# Patient Record
Sex: FEMALE | Race: WHITE | URBAN - METROPOLITAN AREA
[De-identification: names, ages, dates, MRNs, and addresses within clinical notes are randomized per-mention and may not be internally consistent; named-entity substitution may affect disease eponyms.]

---

## 2017-01-09 ENCOUNTER — OFFICE VISIT (OUTPATIENT)
Dept: DERMATOLOGY | Facility: CLINIC | Age: 67
End: 2017-01-09

## 2017-01-09 VITALS — DIASTOLIC BLOOD PRESSURE: 90 MMHG | HEART RATE: 73 BPM | SYSTOLIC BLOOD PRESSURE: 139 MMHG

## 2017-01-09 DIAGNOSIS — L30.9 CHRONIC DERMATITIS: ICD-10-CM

## 2017-01-09 DIAGNOSIS — L30.9 CHRONIC DERMATITIS OF HANDS: ICD-10-CM

## 2017-01-09 DIAGNOSIS — L23.9 ALLERGIC CONTACT DERMATITIS, UNSPECIFIED TRIGGER: ICD-10-CM

## 2017-01-09 DIAGNOSIS — L23.5 ALLERGIC DERMATITIS DUE TO OTHER CHEMICAL PRODUCT: Primary | ICD-10-CM

## 2017-01-09 RX ORDER — MINOCYCLINE HYDROCHLORIDE 100 MG/1
100 TABLET ORAL 2 TIMES DAILY
Qty: 60 TABLET | Refills: 2 | Status: ON HOLD | OUTPATIENT
Start: 2017-01-09 | End: 2017-07-03

## 2017-01-09 RX ORDER — TIZANIDINE 2 MG/1
2 TABLET ORAL
Status: ON HOLD | COMMUNITY
Start: 2016-11-15 | End: 2017-07-03

## 2017-01-09 RX ORDER — CLOBETASOL PROPIONATE 0.5 MG/G
OINTMENT TOPICAL
Qty: 60 G | Refills: 11 | Status: SHIPPED | OUTPATIENT
Start: 2017-01-09

## 2017-01-09 RX ORDER — PREDNISONE 10 MG/1
TABLET ORAL
Qty: 49 TABLET | Refills: 2 | Status: SHIPPED | OUTPATIENT
Start: 2017-01-09 | End: 2017-04-17

## 2017-01-09 ASSESSMENT — PAIN SCALES - GENERAL: PAINLEVEL: NO PAIN (0)

## 2017-01-09 NOTE — NURSING NOTE
"Dermatology Rooming Note    Kasey Ibrahim's goals for this visit include:   Chief Complaint   Patient presents with     Derm Problem     Kasey is here today for a 8 week dermatitis follow up. Kasey notes\" The dermatitis is still there. The cracks are still there but everything else it better\"     Charley Marquez MA    "

## 2017-01-09 NOTE — Clinical Note
"1/9/2017       RE: Kasey Ibrahim  [address] [facility] [address]     Dear Colleague,    Thank you for referring your patient, Kasey Ibrahim, to the [facility] DERMATOLOGY at [facility]. Please see a copy of my visit note below.    [facility] Dermatology Note      Dermatology Problem List:  1. Suspected ACD hands and feet:  BX 4/2014 showed spongiotic dermatitis with eos on hands:  DDx ACD vs atopic dermatitis vs numular dermatitis   -Past tx:  nb uvb, MMF, acitretin, methotrexate (all prior to presenting at the )  -Previous patch testing:  positives to gold and bacitracin with possible reactions to 2-bromo-2-nitropropane and mercapto mix (per notes from past dermatologist, full details unavailable).   -Currently on minocycline (started 8/2016), prednisone, clobetasol 0.05% ointment  -Cannot use dapsone due to sulfa allergy, cannot due [employer] due to work schedule    Encounter Date: Jan 9, 2017    CC:   Chief Complaint   Patient presents with     Derm Problem     Kasey is here today for a 8 week dermatitis follow up. Kasey notes\" The dermatitis is still there. The cracks are still there but everything else it better\"         History of Present Illness:  Ms. Kasey Ibrahim is a 66 year old female who presents for follow-up of allergic contact dermatitis.  Last seen 10/31/2016.  At that time, plan was to switch to dapsone, but patient has sulfa allergy.  She has since been maintained on prednisone 10mg daily and minocycline 100mg BID.  Since last visit, her hands and feet are about the same.  She notes that she continues to develop painful fissures on the palms, finger tips and heels. Her palms are very itchy.  She notes that she had patch testing done at [facility] in the past.  She does not know her allergens.  She uses \"dermatologist\" approved products.  She does not remember getting a packet of information on products she can use.  Today, she " brings with her a lotion that she takes with her to work that has a formaldehyde releaser in it, which is one of her allergens.  She does not know how to read product labels to avoid her allergens.  She uses Dove soap in the shower.  She uses Free and Clear shampoo.   In addition to GoldBond lotion she has with her, she uses other lotions at home but is unsure of the names.      No other skin concerns today.    Past Medical History:   There is no problem list on file for this patient.    Past Medical History   Diagnosis Date     Depression      Hand dermatitis      Past Surgical History   Procedure Laterality Date     Foot surgery       Social History:  The patient works as a  at [employer]. The patient denies use of tanning beds.    Family History:  There is no family history of asthma, eczema or allergies.    Medications:  Current Outpatient Prescriptions   Medication Sig Dispense Refill     tiZANidine (ZANAFLEX) 2 MG tablet Take 2 mg by mouth       predniSONE (DELTASONE) 10 MG tablet Take 1 tablet (10 mg) by mouth daily 30 tablet 2     minocycline (DYNACIN) 100 MG tablet Take 1 tablet (100 mg) by mouth 2 times daily 120 tablet 1     clobetasol (TEMOVATE) 0.05 % ointment Use Monday through Friday twice daily. Can decrease to once daily once hands improved and then just as needed once resolved for mild flares. 60 g 0     FLUOXETINE HCL PO        BuPROPion HCl (WELLBUTRIN PO)        mupirocin (BACTROBAN) 2 % ointment Apply topically 3 times daily          Allergies   Allergen Reactions     Penicillins Rash     Sulfa Drugs Rash     Review of Systems:  -Constitutional:  Feeling well, in usual state of health.  -Skin:  As per HPI, no additional concerns.    Physical exam:  Vitals: /90 mmHg  Pulse 73  GEN: This is a well developed, well-nourished female in no acute distress, in a pleasant mood.     SKIN: Focused examination of the face, hands and feet was performed.  -Hyperkeratotic skin colored papules at  the thenar eminence bilaterally.  -Deep fissuring at several fingertips, worst on the right first digit.  -Deep fissuring at the bilateral heels.     -No other lesions of concern on areas examined.      Impression/Plan:  1. Suspected allergic contact dermatitis (ACD) of the hands and feet:  Reviewed pathogenesis of allergic contact dermatitis in detail today.  We suspect that patient is still coming into contact with allergens, as displayed by hand lotion containing a formaldehyde releaser.  Today, we will request records from [facility] on previous patch testing to see if reactions were felt to be relevant.  We will attempt to obtain patient's camp numbers so she does not have to read all labels.  We did give the patient a list of formaldehyde releasers to look for in products.  Patient is willing to bring in products with her to next appointment so labels can be checked.  In the meantime, instructed patient to use only Vanicream moisturizer on her hands.  We discussed that Dove soap is likely ok, but would recommend use of Vanicream bar soap to be safe in the meantime.  We will continue prednisone (will do 20mg daily x7 days then 10mg daily until next follow up), clobetasol 0.05% ointment twice daily after soaking, and minocycline 100mg BID until next follow up.  If records from patch testing and review of products does not offer new insight, would consider trial of cyclosporine as it does not appear this was tried from review of past records.      Follow-up in 6 weeks, earlier for new or changing lesions.     Dr. Zuniga staffed the patient.    Staff Involved:  Resident(Elaina Osborne)/Staff(as above)    .I was present for key portions of the history and exam.  See resident note for pertinent history, exam, and treatment plan.  Randee Zuniga MD

## 2017-01-09 NOTE — PROGRESS NOTES
"[facility] Dermatology Note      Dermatology Problem List:  1. Suspected ACD hands and feet:  BX 4/2014 showed spongiotic dermatitis with eos on hands:  DDx ACD vs atopic dermatitis vs numular dermatitis   -Past tx:  nb uvb, MMF, acitretin, methotrexate (all prior to presenting at the )  -Previous patch testing:  positives to gold and bacitracin with possible reactions to 2-bromo-2-nitropropane and mercapto mix (per notes from past dermatologist, full details unavailable).   -Currently on minocycline (started 8/2016), prednisone, clobetasol 0.05% ointment  -Cannot use dapsone due to sulfa allergy, cannot due [employer] due to work schedule    Encounter Date: Jan 9, 2017    CC:   Chief Complaint   Patient presents with     Derm Problem     Kasey is here today for a 8 week dermatitis follow up. Kasey notes\" The dermatitis is still there. The cracks are still there but everything else it better\"         History of Present Illness:  Ms. Kasey Ibrhaim is a 66 year old female who presents for follow-up of allergic contact dermatitis.  Last seen 10/31/2016.  At that time, plan was to switch to dapsone, but patient has sulfa allergy.  She has since been maintained on prednisone 10mg daily and minocycline 100mg BID.  Since last visit, her hands and feet are about the same.  She notes that she continues to develop painful fissures on the palms, finger tips and heels. Her palms are very itchy.  She notes that she had patch testing done at [facility] in the past.  She does not know her allergens.  She uses \"dermatologist\" approved products.  She does not remember getting a packet of information on products she can use.  Today, she brings with her a lotion that she takes with her to work that has a formaldehyde releaser in it, which is one of her allergens.  She does not know how to read product labels to avoid her allergens.  She uses Dove soap in the shower.  She uses Free and Clear shampoo.   In addition to " GoldBond lotion she has with her, she uses other lotions at home but is unsure of the names.      No other skin concerns today.    Past Medical History:   There is no problem list on file for this patient.    Past Medical History   Diagnosis Date     Depression      Hand dermatitis      Past Surgical History   Procedure Laterality Date     Foot surgery       Social History:  The patient works as a  at [employer]. The patient denies use of tanning beds.    Family History:  There is no family history of asthma, eczema or allergies.    Medications:  Current Outpatient Prescriptions   Medication Sig Dispense Refill     tiZANidine (ZANAFLEX) 2 MG tablet Take 2 mg by mouth       predniSONE (DELTASONE) 10 MG tablet Take 1 tablet (10 mg) by mouth daily 30 tablet 2     minocycline (DYNACIN) 100 MG tablet Take 1 tablet (100 mg) by mouth 2 times daily 120 tablet 1     clobetasol (TEMOVATE) 0.05 % ointment Use Monday through Friday twice daily. Can decrease to once daily once hands improved and then just as needed once resolved for mild flares. 60 g 0     FLUOXETINE HCL PO        BuPROPion HCl (WELLBUTRIN PO)        mupirocin (BACTROBAN) 2 % ointment Apply topically 3 times daily          Allergies   Allergen Reactions     Penicillins Rash     Sulfa Drugs Rash     Review of Systems:  -Constitutional:  Feeling well, in usual state of health.  -Skin:  As per HPI, no additional concerns.    Physical exam:  Vitals: /90 mmHg  Pulse 73  GEN: This is a well developed, well-nourished female in no acute distress, in a pleasant mood.     SKIN: Focused examination of the face, hands and feet was performed.  -Hyperkeratotic skin colored papules at the thenar eminence bilaterally.  -Deep fissuring at several fingertips, worst on the right first digit.  -Deep fissuring at the bilateral heels.     -No other lesions of concern on areas examined.      Impression/Plan:  1. Suspected allergic contact dermatitis (ACD) of the hands and  feet:  Reviewed pathogenesis of allergic contact dermatitis in detail today.  We suspect that patient is still coming into contact with allergens, as displayed by hand lotion containing a formaldehyde releaser.  Today, we will request records from [facility] on previous patch testing to see if reactions were felt to be relevant.  We will attempt to obtain patient's camp numbers so she does not have to read all labels.  We did give the patient a list of formaldehyde releasers to look for in products.  Patient is willing to bring in products with her to next appointment so labels can be checked.  In the meantime, instructed patient to use only Vanicream moisturizer on her hands.  We discussed that Dove soap is likely ok, but would recommend use of Vanicream bar soap to be safe in the meantime.  We will continue prednisone (will do 20mg daily x7 days then 10mg daily until next follow up), clobetasol 0.05% ointment twice daily after soaking, and minocycline 100mg BID until next follow up.  If records from patch testing and review of products does not offer new insight, would consider trial of cyclosporine as it does not appear this was tried from review of past records.      Follow-up in 6 weeks, earlier for new or changing lesions.     Dr. Zuniga staffed the patient.    Staff Involved:  Resident(Elaina Osborne)/Staff(as above)    .I was present for key portions of the history and exam.  See resident note for pertinent history, exam, and treatment plan.  Randee Zuniga MD

## 2017-01-09 NOTE — PATIENT INSTRUCTIONS
"  We do think that your hand rash is due to exposure to an allergen.      Today, we will have you sign a release form to have your records from patch testing pulled.  This will help us to give you products that you can use and determine more if the patch test results were relevant.    Your allergies that we know about are gold, bacitracin, 2-bromo-2-nitropropane (a preservative also known as a formaldehyde releaser), and mercapto mix (a component of rubber).       Check your products for any of these formaldehyde releasers.  This is difficult to do yourself.  If you have trouble, bring products to next visit.    Quaternium-15    Imidazolidinyl urea     Diazolidinyl urea    DMDM hydantoin     2-Bromo-2-nitropropane-1,3-diol     Using \"dermatologist\" approved lotions is not enough because your allergens are your own alone.    For hand lotion, we want you to use only vanicream. This does not have your allergens in it.  Stop applying any other things to the hands besides this and your clobetasol.  Vanicream soaps and Free and Clear shampoo are also free of formaldehyde.      If the records from patch testing do not help us, we still have one medication option called cyclosporine.    Until next visit:    Prednisone 20mg daily x7 days then 10mg daily after    Minocycline 100mg twice daily    Clobetasol ointment twice daily (do soaks at bedtime prior to applying)  "

## 2017-01-09 NOTE — MR AVS SNAPSHOT
"              After Visit Summary   1/9/2017    Kasey Ibrahim    MRN: [id number]           Patient Information     Date Of Birth          1950        Visit Information        Provider Department      1/9/2017 9:45 AM Randee Zuniga MD [facility] Dermatology        Today's Diagnoses     Allergic dermatitis due to other chemical product    -  1     Chronic dermatitis         Allergic contact dermatitis, unspecified trigger         Chronic dermatitis of hands           Care Instructions      We do think that your hand rash is due to exposure to an allergen.      Today, we will have you sign a release form to have your records from patch testing pulled.  This will help us to give you products that you can use and determine more if the patch test results were relevant.    Your allergies that we know about are gold, bacitracin, 2-bromo-2-nitropropane (a preservative also known as a formaldehyde releaser), and mercapto mix (a component of rubber).       Check your products for any of these formaldehyde releasers.  This is difficult to do yourself.  If you have trouble, bring products to next visit.    Quaternium-15    Imidazolidinyl urea     Diazolidinyl urea    DMDM hydantoin     2-Bromo-2-nitropropane-1,3-diol     Using \"dermatologist\" approved lotions is not enough because your allergens are your own alone.    For hand lotion, we want you to use only vanicream. This does not have your allergens in it.  Stop applying any other things to the hands besides this and your clobetasol.  Vanicream soaps and Free and Clear shampoo are also free of formaldehyde.      If the records from patch testing do not help us, we still have one medication option called cyclosporine.    Until next visit:    Prednisone 20mg daily x7 days then 10mg daily after    Minocycline 100mg twice daily    Clobetasol ointment twice daily (do soaks at bedtime prior to applying)        Follow-ups after your visit        Your next 10 " appointments already scheduled     Feb 20, 2017  1:00 PM   (Arrive by 12:45 PM)   Return Visit with Randee Zuniga MD   [facility] Dermatology ([facility] [place])    [address] [address] [phone]              Who to contact     Please call your clinic at [phone] to:    Ask questions about your health    Make or cancel appointments    Discuss your medicines    Learn about your test results    Speak to your doctor   If you have compliments or concerns about an experience at your clinic, or if you wish to file a complaint, please contact [facility] Patient Relations at [phone] or email us at [email or web address]         Additional Information About Your Visit        [employer] Information     [employer] gives you secure access to your electronic health record. If you see a primary care provider, you can also send messages to your care team and make appointments. If you have questions, please call your primary care clinic.  If you do not have a primary care provider, please call [phone] and they will assist you.      [employer] is an electronic gateway that provides easy, online access to your medical records. With [employer], you can request a clinic appointment, read your test results, renew a prescription or communicate with your care team.     To access your existing account, please contact your [facility] or call [phone] for assistance.        Care EveryWhere ID     This is your Care EveryWhere ID. This could be used by other organizations to access your [place] medical records  [id number]        Your Vitals Were     Pulse                   73            Blood Pressure from Last 3 Encounters:   01/09/17 139/90   08/29/16 131/84    Weight from Last 3 Encounters:   No data found for Wt              Today, you had the following     No orders found for display          Today's Medication Changes          These changes are accurate as of: 1/9/17 10:42 AM.  If you have any questions, ask your nurse or doctor.               These medicines have changed or have updated prescriptions.        Dose/Directions    clobetasol 0.05 % ointment   Commonly known as:  TEMOVATE   This may have changed:  additional instructions   Used for:  Allergic contact dermatitis, unspecified trigger, Chronic dermatitis of hands   Changed by:  Randee Zuniga MD        Apply thin layer twice daily to hands and feet.  Soak hands before application at bedtime.   Quantity:  60 g   Refills:  11       predniSONE 10 MG tablet   Commonly known as:  DELTASONE   This may have changed:    - how much to take  - how to take this  - when to take this  - additional instructions   Used for:  Chronic dermatitis   Changed by:  Randee Zuniga MD        Take by mouth 20mg (2 pills) once daily for one week, then take 10mg (one pill) once daily.   Quantity:  49 tablet   Refills:  2            Where to get your medicines      These medications were sent to [facility] [address] [place] [address] [address] [place] [address]  [address] [place] [id number]     Phone:  [phone]    - clobetasol 0.05 % ointment  - minocycline 100 MG tablet  - predniSONE 10 MG tablet             Primary Care Provider Office Phone # Fax #    Ankit Martini [phone]       [facility] [address] [facility] [address]        Thank you!     Thank you for choosing [facility] DERMATOLOGY  for your care. Our goal is always to provide you with excellent care. Hearing back from our patients is one way we can continue to improve our services. Please take a few minutes to complete the written survey that you may receive in the mail after your visit with us. Thank you!             Your Updated Medication List - Protect others around you: Learn how to safely use, store and throw away your medicines at  www.disposemymeds.org.          This list is accurate as of: 1/9/17 10:42 AM.  Always use your most recent med list.                   Brand Name Dispense Instructions for use    clobetasol 0.05 % ointment    TEMOVATE    60 g    Apply thin layer twice daily to hands and feet.  Soak hands before application at bedtime.       FLUOXETINE HCL PO          minocycline 100 MG tablet    DYNACIN    60 tablet    Take 1 tablet (100 mg) by mouth 2 times daily       mupirocin 2 % ointment    BACTROBAN     Apply topically 3 times daily       predniSONE 10 MG tablet    DELTASONE    49 tablet    Take by mouth 20mg (2 pills) once daily for one week, then take 10mg (one pill) once daily.       tiZANidine 2 MG tablet    ZANAFLEX     Take 2 mg by mouth       WELLBUTRIN PO

## 2017-02-20 ENCOUNTER — OFFICE VISIT (OUTPATIENT)
Dept: DERMATOLOGY | Facility: CLINIC | Age: 67
End: 2017-02-20

## 2017-02-20 DIAGNOSIS — L23.9 ALLERGIC CONTACT DERMATITIS, UNSPECIFIED TRIGGER: Primary | ICD-10-CM

## 2017-02-20 ASSESSMENT — PAIN SCALES - GENERAL: PAINLEVEL: EXTREME PAIN (9)

## 2017-02-20 NOTE — MR AVS SNAPSHOT
After Visit Summary   2/20/2017    Kasey Ibrahim    MRN: [id number]           Patient Information     Date Of Birth          1950        Visit Information        Provider Department      2/20/2017 1:00 PM Randee Zuniga MD [facility]        Care Instructions    We will send a letter with instructions on how to get into the [place] website so that you can double check that the product.  Avoid gold, leather, bacitracin, fragrance, botanicals  Use Vaniply or Eucerin as a moisturizer under cotton or vinyl gloves        Follow-ups after your visit        Follow-up notes from your care team     Return in about 1 month (around 3/20/2017).      Your next 10 appointments already scheduled     Mar 20, 2017  1:00 PM CDT   (Arrive by 12:45 PM)   Return Visit with MD CLARICE Robbins [facility] ([facility])    [address] [place] [id number] [phone]              Who to contact     Please call your clinic at [phone] to:    Ask questions about your health    Make or cancel appointments    Discuss your medicines    Learn about your test results    Speak to your doctor   If you have compliments or concerns about an experience at your clinic, or if you wish to file a complaint, please contact [facility] Patient Relations at [phone] or email us at [email or web address]         Additional Information About Your Visit        MyChart Information     [employer] gives you secure access to your electronic health record. If you see a primary care provider, you can also send messages to your care team and make appointments. If you have questions, please call your primary care clinic.  If you do not have a primary care provider, please call [phone] and they will assist you.      [employer] is an electronic gateway that provides easy, online access to your medical records. With [employer],  you can request a clinic appointment, read your test results, renew a prescription or communicate with your care team.     To access your existing account, please contact your [facility] or call [phone] for assistance.        Care EveryWhere ID     This is your Care EveryWhere ID. This could be used by other organizations to access your [place] medical records  [id number]         Blood Pressure from Last 3 Encounters:   01/09/17 139/90   08/29/16 131/84    Weight from Last 3 Encounters:   No data found for Wt              Today, you had the following     No orders found for display         Today's Medication Changes          These changes are accurate as of: 2/20/17  1:50 PM.  If you have any questions, ask your nurse or doctor.               Stop taking these medicines if you haven't already. Please contact your care team if you have questions.     mupirocin 2 % ointment   Commonly known as:  BACTROBAN   Stopped by:  Randee Zuniga MD                    Primary Care Provider Office Phone # Fax #    Ankit Dominguez Martini [phone]       [address] [address]        Thank you!     Thank you for choosing [facility] DERMAT[facility]  for your care. Our goal is always to provide you with excellent care. Hearing back from our patients is one way we can continue to improve our services. Please take a few minutes to complete the written survey that you may receive in the mail after your visit with us. Thank you!             Your Updated Medication List - Protect others around you: Learn how to safely use, store and throw away your medicines at www.disposemymeds.org.          This list is accurate as of: 2/20/17  1:50 PM.  Always use your most recent med list.                   Brand Name Dispense Instructions for use    clobetasol 0.05 % ointment    TEMOVATE    60 g    Apply thin layer twice daily to hands and feet.  Soak  hands before application at bedtime.       FLUOXETINE HCL PO          minocycline 100 MG tablet    DYNACIN    60 tablet    Take 1 tablet (100 mg) by mouth 2 times daily       predniSONE 10 MG tablet    DELTASONE    49 tablet    Take by mouth 20mg (2 pills) once daily for one week, then take 10mg (one pill) once daily.       tiZANidine 2 MG tablet    ZANAFLEX     Take 2 mg by mouth       WELLBUTRIN PO

## 2017-02-20 NOTE — LETTER
"2/20/2017       RE: Kasey Ibrahim  [address] [facility] [address]     Dear Colleague,    Thank you for referring your patient, Kasey Ibrahim, to the [facility] DERMATOLOGY at [facility]. Please see a copy of my visit note below.    [facility] Dermatology Note      Dermatology Problem List:  1. Suspected ACD hands and feet:  BX 4/2014 showed spongiotic dermatitis with eos on hands:  DDx ACD vs atopic dermatitis vs numular dermatitis   -Past tx:  nb uvb, MMF, acitretin, methotrexate (all prior to presenting at the )  -Previous patch testing 8/2013 at [facility]:  2+ glutaraldehyde; mild: gold and Lillian soft moisturizing cream. Doubtful: fragrance mix 2, dodecyl gallate (both of these attributed to irritant reaction and not considered true allergens). Previous positives to bacitracin, bronopol, and mercapto mix were not reproducible; Dr. Quinonez recommended avoidance of bacitracin. Avoid leather.  -Currently on minocycline (started 8/2016), prednisone, clobetasol 0.05% ointment  -Cannot use dapsone due to sulfa allergy, cannot due UVA1 due to work schedule  -Consider CSA in the future    Encounter Date: Feb 20, 2017    CC:   Chief Complaint   Patient presents with     Derm Problem     Kasey is here today for dermatitis on her hands. She states \" my hands are not doing any better\"         History of Present Illness:  Ms. Kasey Ibrahim is a 66 year old female who presents for follow-up of allergic contact dermatitis.  Last seen 1/9/16, at which time she was to be taking prednisone  20mg for 1 week then 10mg daily, minocycline 100mg BID, and clobetasol. She was instructed to bring patch test results today.  She reports \"nothing seems to work.\" She is taking prednisone 10mg daily and thinks she was better controlled on 40mg in the past. She is not doing any better today. She takes minocycline as prescribed and applies clobetasol under occlusion a few " times per week under occlusion; on the other nights, she uses Vaseline under occlusion.  She does not improve away from work.    No other skin concerns today.    Past Medical History:   There is no problem list on file for this patient.    Past Medical History   Diagnosis Date     Depression      Hand dermatitis      Past Surgical History   Procedure Laterality Date     Foot surgery       Social History:  The patient works as a  at [employer]. The patient denies use of tanning beds.    Family History:  There is no family history of asthma, eczema or allergies.    Medications:  Current Outpatient Prescriptions   Medication Sig Dispense Refill     tiZANidine (ZANAFLEX) 2 MG tablet Take 2 mg by mouth       minocycline (DYNACIN) 100 MG tablet Take 1 tablet (100 mg) by mouth 2 times daily 60 tablet 2     predniSONE (DELTASONE) 10 MG tablet Take by mouth 20mg (2 pills) once daily for one week, then take 10mg (one pill) once daily. 49 tablet 2     clobetasol (TEMOVATE) 0.05 % ointment Apply thin layer twice daily to hands and feet.  Soak hands before application at bedtime. 60 g 11     FLUOXETINE HCL PO        BuPROPion HCl (WELLBUTRIN PO)           Allergies   Allergen Reactions     Penicillins Rash     Sulfa Drugs Rash     Review of Systems:  -Feels otherwise well except for feeling tired, denies other skin concerns.    Physical exam:  Vitals: There were no vitals taken for this visit.  GEN: This is a well developed, well-nourished female in no acute distress, in a pleasant mood.     SKIN: Focused examination of the face, hands and feet was performed.  - Erythematous hyperkeratotic plaques on the b/l medial and lateral distal aspects of the palms, b/l palmar distal thumbs, and R palmar distal 2nd finger. Fissures R 1st and 2nd distal fingers. Hyperkeratosis and fissures on the heels. Hyperkeratotic papules on the b/l medial great toes.  -No other lesions of concern on areas examined.      Impression/Plan:  1.  Suspected allergic contact dermatitis (ACD) of the hands and feet:  Reviewed recommendations from Dr. Quinonez today, including avoidance of leather and fragrance which she has not been doing. Unfortunately, we were unable to get into the [place] website with her codes. Will discuss with Dr. Quinonez how to access her CAMP list. (ADDENDUM 2/21/17: Dr. Quinonez's [facility] team will email and/or mail a CAMP list or instructions on how to access the website with her new codes; the website has changed since she had patch testing done, which is why her old codes no longer work).  Will taper off prednisone by taking 10mg every other day until she finishes her pills, then stop. Continue minocycline 100mg BID, and increase use of clobetasol 0.05% ointment to once a night under occlusion. Consider CSA if not improved with avoidance of allergens.    Follow-up in 1 month, earlier for new or changing lesions.     Dr. Zuniga staffed the patient.    Staff Involved:  Resident(Pitt/Staff(as above)    .I, Randee Zuniga MD, saw this patient with the resident and agree with the resident s findings and plan of care as documented in the resident s note.          Sincerely,    Randee Zuniga MD

## 2017-02-20 NOTE — PROGRESS NOTES
"[facility] Dermatology Note      Dermatology Problem List:  1. Suspected ACD hands and feet:  BX 4/2014 showed spongiotic dermatitis with eos on hands:  DDx ACD vs atopic dermatitis vs numular dermatitis   -Past tx:  nb uvb, MMF, acitretin, methotrexate (all prior to presenting at the )  -Previous patch testing 8/2013 at [facility]:  2+ glutaraldehyde; mild: gold and Lillian soft moisturizing cream. Doubtful: fragrance mix 2, dodecyl gallate (both of these attributed to irritant reaction and not considered true allergens). Previous positives to bacitracin, bronopol, and mercapto mix were not reproducible; Dr. Quinonez recommended avoidance of bacitracin. Avoid leather.  -Currently on minocycline (started 8/2016), prednisone, clobetasol 0.05% ointment  -Cannot use dapsone due to sulfa allergy, cannot due UVA1 due to work schedule  -Consider CSA in the future    Encounter Date: Feb 20, 2017    CC:   Chief Complaint   Patient presents with     Derm Problem     Kasey is here today for dermatitis on her hands. She states \" my hands are not doing any better\"         History of Present Illness:  Ms. Kasey Ibrahim is a 66 year old female who presents for follow-up of allergic contact dermatitis.  Last seen 1/9/16, at which time she was to be taking prednisone  20mg for 1 week then 10mg daily, minocycline 100mg BID, and clobetasol. She was instructed to bring patch test results today.  She reports \"nothing seems to work.\" She is taking prednisone 10mg daily and thinks she was better controlled on 40mg in the past. She is not doing any better today. She takes minocycline as prescribed and applies clobetasol under occlusion a few times per week under occlusion; on the other nights, she uses Vaseline under occlusion.  She does not improve away from work.    No other skin concerns today.    Past Medical History:   There is no problem list on file for this patient.    Past Medical History   Diagnosis Date     " Depression      Hand dermatitis      Past Surgical History   Procedure Laterality Date     Foot surgery       Social History:  The patient works as a  at [employer]. The patient denies use of tanning beds.    Family History:  There is no family history of asthma, eczema or allergies.    Medications:  Current Outpatient Prescriptions   Medication Sig Dispense Refill     tiZANidine (ZANAFLEX) 2 MG tablet Take 2 mg by mouth       minocycline (DYNACIN) 100 MG tablet Take 1 tablet (100 mg) by mouth 2 times daily 60 tablet 2     predniSONE (DELTASONE) 10 MG tablet Take by mouth 20mg (2 pills) once daily for one week, then take 10mg (one pill) once daily. 49 tablet 2     clobetasol (TEMOVATE) 0.05 % ointment Apply thin layer twice daily to hands and feet.  Soak hands before application at bedtime. 60 g 11     FLUOXETINE HCL PO        BuPROPion HCl (WELLBUTRIN PO)           Allergies   Allergen Reactions     Penicillins Rash     Sulfa Drugs Rash     Review of Systems:  -Feels otherwise well except for feeling tired, denies other skin concerns.    Physical exam:  Vitals: There were no vitals taken for this visit.  GEN: This is a well developed, well-nourished female in no acute distress, in a pleasant mood.     SKIN: Focused examination of the face, hands and feet was performed.  - Erythematous hyperkeratotic plaques on the b/l medial and lateral distal aspects of the palms, b/l palmar distal thumbs, and R palmar distal 2nd finger. Fissures R 1st and 2nd distal fingers. Hyperkeratosis and fissures on the heels. Hyperkeratotic papules on the b/l medial great toes.  -No other lesions of concern on areas examined.      Impression/Plan:  1. Suspected allergic contact dermatitis (ACD) of the hands and feet:  Reviewed recommendations from Dr. Quinonez today, including avoidance of leather and fragrance which she has not been doing. Unfortunately, we were unable to get into the [place] website with her codes. Will discuss with   Cristiano how to access her CAMP list. (ADDENDUM 2/21/17: Dr. Quinonez's [facility] team will email and/or mail a CAMP list or instructions on how to access the website with her new codes; the website has changed since she had patch testing done, which is why her old codes no longer work).  Will taper off prednisone by taking 10mg every other day until she finishes her pills, then stop. Continue minocycline 100mg BID, and increase use of clobetasol 0.05% ointment to once a night under occlusion. Consider CSA if not improved with avoidance of allergens.    Follow-up in 1 month, earlier for new or changing lesions.     Dr. Zuniga staffed the patient.    Staff Involved:  Resident(Pitt/Staff(as above)    .I, Randee Zuniga MD, saw this patient with the resident and agree with the resident s findings and plan of care as documented in the resident s note.

## 2017-02-20 NOTE — PATIENT INSTRUCTIONS
We will send a letter with instructions on how to get into the [place] website so that you can double check that the product.  Avoid gold, leather, bacitracin, fragrance, botanicals  Use Vaniply or Eucerin as a moisturizer under cotton or vinyl gloves

## 2017-02-20 NOTE — NURSING NOTE
"Dermatology Rooming Note    Kasey Ibrahim's goals for this visit include:   Chief Complaint   Patient presents with     Derm Problem     Kasey is here today for dermatitis on her hands. She states \" my hands are not doing any better\"           Sasha Gutierrez, BRIDGER    "

## 2017-03-20 ENCOUNTER — OFFICE VISIT (OUTPATIENT)
Dept: DERMATOLOGY | Facility: CLINIC | Age: 67
End: 2017-03-20

## 2017-03-20 VITALS — SYSTOLIC BLOOD PRESSURE: 125 MMHG | WEIGHT: 177.8 LBS | DIASTOLIC BLOOD PRESSURE: 77 MMHG | HEART RATE: 75 BPM

## 2017-03-20 DIAGNOSIS — L23.3 ALLERGIC CONTACT DERMATITIS DUE TO DRUGS IN CONTACT WITH SKIN: ICD-10-CM

## 2017-03-20 DIAGNOSIS — Z79.899 MEDICATION MANAGEMENT: ICD-10-CM

## 2017-03-20 DIAGNOSIS — L23.3 ALLERGIC CONTACT DERMATITIS DUE TO DRUGS IN CONTACT WITH SKIN: Primary | ICD-10-CM

## 2017-03-20 LAB
ALBUMIN SERPL-MCNC: 3.8 G/DL (ref 3.4–5)
ALBUMIN UR-MCNC: NEGATIVE MG/DL
ALP SERPL-CCNC: 70 U/L (ref 40–150)
ALT SERPL W P-5'-P-CCNC: 25 U/L (ref 0–50)
ANION GAP SERPL CALCULATED.3IONS-SCNC: 8 MMOL/L (ref 3–14)
APPEARANCE UR: CLEAR
AST SERPL W P-5'-P-CCNC: 17 U/L (ref 0–45)
BASOPHILS # BLD AUTO: 0 10E9/L (ref 0–0.2)
BASOPHILS NFR BLD AUTO: 0.7 %
BILIRUB SERPL-MCNC: 0.3 MG/DL (ref 0.2–1.3)
BILIRUB UR QL STRIP: NEGATIVE
BUN SERPL-MCNC: 20 MG/DL (ref 7–30)
CALCIUM SERPL-MCNC: 9.4 MG/DL (ref 8.5–10.1)
CHLORIDE SERPL-SCNC: 104 MMOL/L (ref 94–109)
CHOLEST SERPL-MCNC: 221 MG/DL
CO2 SERPL-SCNC: 29 MMOL/L (ref 20–32)
COLOR UR AUTO: YELLOW
CREAT SERPL-MCNC: 0.87 MG/DL (ref 0.52–1.04)
DIFFERENTIAL METHOD BLD: NORMAL
EOSINOPHIL # BLD AUTO: 0.4 10E9/L (ref 0–0.7)
EOSINOPHIL NFR BLD AUTO: 6.3 %
ERYTHROCYTE [DISTWIDTH] IN BLOOD BY AUTOMATED COUNT: 13.7 % (ref 10–15)
GFR SERPL CREATININE-BSD FRML MDRD: 65 ML/MIN/1.7M2
GLUCOSE SERPL-MCNC: 91 MG/DL (ref 70–99)
GLUCOSE UR STRIP-MCNC: NEGATIVE MG/DL
HCT VFR BLD AUTO: 42.8 % (ref 35–47)
HDLC SERPL-MCNC: 67 MG/DL
HGB BLD-MCNC: 14.3 G/DL (ref 11.7–15.7)
HGB UR QL STRIP: NEGATIVE
IMM GRANULOCYTES # BLD: 0 10E9/L (ref 0–0.4)
IMM GRANULOCYTES NFR BLD: 0.4 %
KETONES UR STRIP-MCNC: 20 MG/DL
LDLC SERPL CALC-MCNC: 114 MG/DL
LEUKOCYTE ESTERASE UR QL STRIP: NEGATIVE
LYMPHOCYTES # BLD AUTO: 0.9 10E9/L (ref 0.8–5.3)
LYMPHOCYTES NFR BLD AUTO: 15.6 %
MAGNESIUM SERPL-MCNC: 2.4 MG/DL (ref 1.6–2.3)
MCH RBC QN AUTO: 31.4 PG (ref 26.5–33)
MCHC RBC AUTO-ENTMCNC: 33.4 G/DL (ref 31.5–36.5)
MCV RBC AUTO: 94 FL (ref 78–100)
MONOCYTES # BLD AUTO: 0.7 10E9/L (ref 0–1.3)
MONOCYTES NFR BLD AUTO: 13.2 %
MUCOUS THREADS #/AREA URNS LPF: PRESENT /LPF
NEUTROPHILS # BLD AUTO: 3.6 10E9/L (ref 1.6–8.3)
NEUTROPHILS NFR BLD AUTO: 63.8 %
NITRATE UR QL: NEGATIVE
NONHDLC SERPL-MCNC: 154 MG/DL
NRBC # BLD AUTO: 0 10*3/UL
NRBC BLD AUTO-RTO: 0 /100
PH UR STRIP: 5 PH (ref 5–7)
PLATELET # BLD AUTO: 254 10E9/L (ref 150–450)
POTASSIUM SERPL-SCNC: 4.2 MMOL/L (ref 3.4–5.3)
PROT SERPL-MCNC: 7.2 G/DL (ref 6.8–8.8)
RBC # BLD AUTO: 4.56 10E12/L (ref 3.8–5.2)
RBC #/AREA URNS AUTO: 1 /HPF (ref 0–2)
SODIUM SERPL-SCNC: 141 MMOL/L (ref 133–144)
SP GR UR STRIP: 1.02 (ref 1–1.03)
SQUAMOUS #/AREA URNS AUTO: <1 /HPF (ref 0–1)
TRIGL SERPL-MCNC: 200 MG/DL
URATE SERPL-MCNC: 3.1 MG/DL (ref 2.6–6)
URN SPEC COLLECT METH UR: ABNORMAL
UROBILINOGEN UR STRIP-MCNC: 0 MG/DL (ref 0–2)
WBC # BLD AUTO: 5.6 10E9/L (ref 4–11)
WBC #/AREA URNS AUTO: 1 /HPF (ref 0–2)

## 2017-03-20 RX ORDER — BETAMETHASONE DIPROPIONATE 0.5 MG/G
OINTMENT, AUGMENTED TOPICAL
Qty: 50 G | Refills: 3 | Status: SHIPPED | OUTPATIENT
Start: 2017-03-20 | End: 2017-04-17

## 2017-03-20 ASSESSMENT — PAIN SCALES - GENERAL: PAINLEVEL: MODERATE PAIN (4)

## 2017-03-20 NOTE — NURSING NOTE
"Dermatology Rooming Note    Kasey Ibrahim's goals for this visit include:   Chief Complaint   Patient presents with     Derm Problem     Kasey is here for a follow up for her hands.  States that \"they're worsening.\"         Shea Ding LPN    "

## 2017-03-20 NOTE — LETTER
"3/20/2017       RE: Kasey Ibrahim  [address] [facility] [address]     Dear Colleague,    Thank you for referring your patient, Kasey Ibrahim, to the [facility] DERMATOLOGY at [facility]. Please see a copy of my visit note below.    [facility] Dermatology Note      Dermatology Problem List:  1. Suspected ACD hands and feet:  BX 4/2014 showed spongiotic dermatitis with eos on hands:  DDx ACD vs atopic dermatitis vs numular dermatitis   -Past tx:  nb uvb, MMF, acitretin, methotrexate (all prior to presenting at the )  -Previous patch testing 8/2013 at [facility]:  2+ glutaraldehyde; mild: gold and Lillian soft moisturizing cream. Doubtful: fragrance mix 2, dodecyl gallate (both of these attributed to irritant reaction and not considered true allergens). Previous positives to bacitracin, bronopol, and mercapto mix were not reproducible; Dr. Quinonez recommended avoidance of bacitracin. Avoid leather.  -Currently on minocycline (started 8/2016), prednisone, clobetasol 0.05% ointment  -Cannot use dapsone due to sulfa allergy, cannot due UVA1 due to work schedule  -Consider CSA in the future    Encounter Date: Mar 20, 2017    CC:   Chief Complaint   Patient presents with     Derm Problem     Kasey is here for a follow up for her hands.  States that \"they're worsening.\"         History of Present Illness:  Ms. Kasey Ibrahim is a 66 year old female who presents for follow-up of suspected allergic contact dermatitis of hands and feet.  Last seen 2/20/17, at which time she was advised to taper off prednisone by taking 10mg every other day until she finished her pills, then stop, continue minocycline 100mg BID, and increase use of clobetasol 0.05% ointment to once a night under occlusion. We were also going to fellow-up with her re: C[place] list access as she had trouble logging onto the site. We also instructed her to avoid gold, leather, bacitracin, fragrance, " "and botanicals, as well as to use Vaniply or Eucerin as a moisturizer under cotton or vinyl gloves.    Today, she reports that things have been getting worse. She has been using only topical Vaniply, throughout the day. She also purchased fingertip gauze to cover the areas. She does not sleep in gloves at night because they make her hands too hot and itchy. She finished the course of prednisone over 1.5 weeks ago, she has not noticed a big difference since tapering off. Taking minocycline twice daily as prescribed, tolerating well with no headaches, vision changes, or abdominal pain. She is not sure the minocycline really helped much. She has not been using the clobetasol ointment at all, as she understood the instructions to mean \"nothing but Vaniply\". She received new codes in the mal, and a booklet, but codes don't work and she doesn't know how to use book. She did buy a new purse and wallet without leather. She has stopped wearing jewelry or fragrance.    Additonal concerns for Ms. Ibrahim today include a somewhat itchy and flaky scalp.    No other skin concerns today.    Past Medical History:   There is no problem list on file for this patient.    Past Medical History   Diagnosis Date     Depression      Hand dermatitis      Past Surgical History   Procedure Laterality Date     Foot surgery       Social History:  The patient works as a  at [employer]. The patient denies use of tanning beds.    Family History:  There is no family history of asthma, eczema or allergies.    Medications:  Current Outpatient Prescriptions   Medication Sig Dispense Refill     tiZANidine (ZANAFLEX) 2 MG tablet Take 2 mg by mouth       minocycline (DYNACIN) 100 MG tablet Take 1 tablet (100 mg) by mouth 2 times daily 60 tablet 2     predniSONE (DELTASONE) 10 MG tablet Take by mouth 20mg (2 pills) once daily for one week, then take 10mg (one pill) once daily. 49 tablet 2     clobetasol (TEMOVATE) 0.05 % ointment Apply thin layer twice " daily to hands and feet.  Soak hands before application at bedtime. 60 g 11     FLUOXETINE HCL PO        BuPROPion HCl (WELLBUTRIN PO)           Allergies   Allergen Reactions     Bacitracin Rash     Patch testing     Glutaraldehyde Rash     2+ patch testing     Gold Rash     Mild patch test reaction     Penicillins Rash     Sulfa Drugs Rash     Review of Systems:  -Feels otherwise well except for feeling tired, denies other skin concerns.    Physical exam:  Vitals: /77 (BP Location: Right arm, Patient Position: Chair, Cuff Size: Adult Large)  Pulse 75  GEN: This is a well developed, well-nourished female in no acute distress, in a pleasant mood.     SKIN: Focused examination of the face, hands and feet was performed.  - Erythematous hyperkeratotic plaques on the b/l medial and lateral distal aspects of the palms, b/l palmar distal thumbs, and R palmar distal 2nd finger. Fissures R 1st and 2nd distal fingers. Hyperkeratosis and fissures on the heels. Hyperkeratotic papules on the b/l medial great toes.  - Light tan mottled dyspigmentation of the forearms  - R lower leg: blue gray macules and patches at previous sites of trauma  - scalp with mild scale overlying mild macular erythema  -No other lesions of concern on areas examined.      Impression/Plan:  1. Suspected allergic contact dermatitis (ACD) of the hands and feet:  Reviewed recommendations from Dr. Quinonez today, including avoidance of leather and fragrance which she has not been doing. Pulled up CAMP list today in clinic, printed out for fariha and went over in depth.   PLAN today:  1) Diprolene- AF ointment 0.05% twice daily   2) Apply thin layer of vaniply over medication  3) Apply cotton gloves over steroid ointment and emollient combo (advised applying medication and emollient before work and then wearing cotton gloves over while driving to work, wearing cotton gloves for occlusion at night)  4) CAMP list re-printed  5) cyclosporine baseline  labs: UA, CBC w/ diff, CMP, lipids, magnesium, uric acid  6) plan on cyclosporine 3mg/kg daily pending normal labs with weekly labs for the next 4 weeks (all but UA)- printed for patient to get done at [facility], will ask them to fax over results    2. Minocycline induced hyperpigmentation: Edmonds brown type of forearms and blue gray pigment deposition in scars.  PLAN today:  1) Stop minocycline; fortunately, this wasn't the magic treatment for Ms. Ibrahim's dermatitis.   2) Counseled that pigmentation should fade over time    3. Seborrheic dermatitis  PLAN today:  - DHS or Neutrogena T-sal shampoos (safe on CAMP list)     Follow-up in 1 month, earlier for new or changing lesions.     Dr. Zuniga staffed the patient.    Staff Involved:  ResidentChelsey/Staff(as above)     Yessi Roy MD  PGY-3 Dermatology  Pager: [phone]      .I, Randee Zuniga MD, saw this patient with the resident and agree with the resident s findings and plan of care as documented in the resident s note.        Again, thank you for allowing me to participate in the care of your patient.      Sincerely,    Randee Zuniga MD

## 2017-03-20 NOTE — MR AVS SNAPSHOT
After Visit Summary   3/20/2017    Kasey Ibrahim    MRN: [id number]           Patient Information     Date Of Birth          1950        Visit Information        Provider Department      3/20/2017 1:00 PM Randee Zuniga MD [facility] Dermatology        Today's Diagnoses     Allergic contact dermatitis due to drugs in contact with skin    -  1    Medication management           Follow-ups after your visit        Your next 10 appointments already scheduled     Mar 20, 2017  2:15 PM CDT   LAB with  LAB   [facility] ([facility])    [address] [place] [id number] [phone]           Patient must bring picture ID.  Patient should be prepared to give a urine specimen  Please do not eat 10-12 hours before your appointment if you are coming in fasting for labs on lipids, cholesterol, or glucose (sugar).  Pregnant women should follow their Care Team instructions. Water with medications is okay. Do not drink coffee or other fluids.   If you have concerns about taking  your medications, please ask at office or if scheduling via [employer], send a message by clicking on Secure Messaging, Message Your Care Team.            Apr 17, 2017  1:15 PM CDT   (Arrive by 1:00 PM)   Return Visit with Randee Zuniga MD   [facility] Dermatology ([facility])    [address] [place] [id number] [phone]              Future tests that were ordered for you today     Open Standing Orders        Priority Remaining Interval Expires Ordered    CBC with platelets differential Routine 4/4 1 week 3/20/2018 3/20/2017    Comprehensive metabolic panel Routine 4/4 1 week 3/20/2018 3/20/2017    Magnesium Routine 4/4 1 week 3/20/2018 3/20/2017    Lipid Profile Routine 4/4 1 week 3/20/2018 3/20/2017    Uric acid Routine 4/4 1 week 3/20/2018 3/20/2017          Open Future Orders        Priority Expected Expires  Ordered    Routine UA with micro reflex to culture Routine  3/20/2018 3/20/2017    Comprehensive metabolic panel Routine  3/20/2018 3/20/2017    Magnesium Routine  3/20/2018 3/20/2017    Lipid panel reflex to direct LDL Routine  3/20/2018 3/20/2017    Uric acid Routine  3/20/2018 3/20/2017            Who to contact     Please call your clinic at [phone] to:    Ask questions about your health    Make or cancel appointments    Discuss your medicines    Learn about your test results    Speak to your doctor   If you have compliments or concerns about an experience at your clinic, or if you wish to file a complaint, please contact [facility] Patient Relations at [phone] or email us at [email or web address]         Additional Information About Your Visit        EncapharGeneAssess Information     GoFish gives you secure access to your electronic health record. If you see a primary care provider, you can also send messages to your care team and make appointments. If you have questions, please call your primary care clinic.  If you do not have a primary care provider, please call [phone] and they will assist you.      GoFish is an electronic gateway that provides easy, online access to your medical records. With GoFish, you can request a clinic appointment, read your test results, renew a prescription or communicate with your care team.     To access your existing account, please contact your [facility] or call [phone] for assistance.        Care EveryWhere ID     This is your Care EveryWhere ID. This could be used by other organizations to access your [place] medical records  [id number]        Your Vitals Were     Pulse                   75            Blood Pressure from Last 3 Encounters:   03/20/17 125/77   01/09/17 139/90   08/29/16 131/84    Weight from Last 3 Encounters:   No data found for Wt              We Performed the Following      CBC with platelets differential          Today's Medication Changes          These changes are accurate as of: 3/20/17  1:53 PM.  If you have any questions, ask your nurse or doctor.               Start taking these medicines.        Dose/Directions    augmented betamethasone dipropionate 0.05 % ointment   Commonly known as:  DIPROLENE-AF   Used for:  Allergic contact dermatitis due to drugs in contact with skin   Started by:  Randee Zuniga MD        Apply to affected areas of hand twice daily, everyday. Use Vaniply on top of this medication. Occlude with gloves at night.   Quantity:  50 g   Refills:  3            Where to get your medicines      These medications were sent to [facility] [address] [place], [place] [address]   [address] [facility] [address]     Phone:  [phone]     augmented betamethasone dipropionate 0.05 % ointment                Primary Care Provider Office Phone # Fax #    Ankit Martini [phone]       [facility] [address] [facility] [address]        Thank you!     Thank you for choosing [facility] DERMATOLOGY  for your care. Our goal is always to provide you with excellent care. Hearing back from our patients is one way we can continue to improve our services. Please take a few minutes to complete the written survey that you may receive in the mail after your visit with us. Thank you!             Your Updated Medication List - Protect others around you: Learn how to safely use, store and throw away your medicines at www.disposemymeds.org.          This list is accurate as of: 3/20/17  1:53 PM.  Always use your most recent med list.                   Brand Name Dispense Instructions for use    augmented betamethasone dipropionate 0.05 % ointment    DIPROLENE-AF    50 g    Apply to affected areas of hand twice daily, everyday. Use Vaniply on top of this medication. Occlude with gloves at night.       clobetasol 0.05 %  ointment    TEMOVATE    60 g    Apply thin layer twice daily to hands and feet.  Soak hands before application at bedtime.       FLUOXETINE HCL PO          minocycline 100 MG tablet    DYNACIN    60 tablet    Take 1 tablet (100 mg) by mouth 2 times daily       predniSONE 10 MG tablet    DELTASONE    49 tablet    Take by mouth 20mg (2 pills) once daily for one week, then take 10mg (one pill) once daily.       tiZANidine 2 MG tablet    ZANAFLEX     Take 2 mg by mouth Reported on 3/20/2017       WELLBUTRIN PO

## 2017-03-20 NOTE — PROGRESS NOTES
"[facility] Dermatology Note      Dermatology Problem List:  1. Suspected ACD hands and feet:  BX 4/2014 showed spongiotic dermatitis with eos on hands:  DDx ACD vs atopic dermatitis vs numular dermatitis   -Past tx:  nb uvb, MMF, acitretin, methotrexate (all prior to presenting at the )  -Previous patch testing 8/2013 at [facility]:  2+ glutaraldehyde; mild: gold and Lillian soft moisturizing cream. Doubtful: fragrance mix 2, dodecyl gallate (both of these attributed to irritant reaction and not considered true allergens). Previous positives to bacitracin, bronopol, and mercapto mix were not reproducible; Dr. Quinonez recommended avoidance of bacitracin. Avoid leather.  -Currently on minocycline (started 8/2016), prednisone, clobetasol 0.05% ointment  -Cannot use dapsone due to sulfa allergy, cannot due UVA1 due to work schedule  -Consider CSA in the future    Encounter Date: Mar 20, 2017    CC:   Chief Complaint   Patient presents with     Derm Problem     Kasey is here for a follow up for her hands.  States that \"they're worsening.\"         History of Present Illness:  Ms. Kasye Ibrahim is a 66 year old female who presents for follow-up of suspected allergic contact dermatitis of hands and feet.  Last seen 2/20/17, at which time she was advised to taper off prednisone by taking 10mg every other day until she finished her pills, then stop, continue minocycline 100mg BID, and increase use of clobetasol 0.05% ointment to once a night under occlusion. We were also going to fellow-up with her re: [place] list access as she had trouble logging onto the site. We also instructed her to avoid gold, leather, bacitracin, fragrance, and botanicals, as well as to use Vaniply or Eucerin as a moisturizer under cotton or vinyl gloves.    Today, she reports that things have been getting worse. She has been using only topical Vaniply, throughout the day. She also purchased fingertip gauze to cover the areas. She does " "not sleep in gloves at night because they make her hands too hot and itchy. She finished the course of prednisone over 1.5 weeks ago, she has not noticed a big difference since tapering off. Taking minocycline twice daily as prescribed, tolerating well with no headaches, vision changes, or abdominal pain. She is not sure the minocycline really helped much. She has not been using the clobetasol ointment at all, as she understood the instructions to mean \"nothing but Vaniply\". She received new codes in the mal, and a booklet, but codes don't work and she doesn't know how to use book. She did buy a new purse and wallet without leather. She has stopped wearing jewelry or fragrance.    Additonal concerns for Ms. Ibrahim today include a somewhat itchy and flaky scalp.    No other skin concerns today.    Past Medical History:   There is no problem list on file for this patient.    Past Medical History   Diagnosis Date     Depression      Hand dermatitis      Past Surgical History   Procedure Laterality Date     Foot surgery       Social History:  The patient works as a  at [employer]. The patient denies use of tanning beds.    Family History:  There is no family history of asthma, eczema or allergies.    Medications:  Current Outpatient Prescriptions   Medication Sig Dispense Refill     tiZANidine (ZANAFLEX) 2 MG tablet Take 2 mg by mouth       minocycline (DYNACIN) 100 MG tablet Take 1 tablet (100 mg) by mouth 2 times daily 60 tablet 2     predniSONE (DELTASONE) 10 MG tablet Take by mouth 20mg (2 pills) once daily for one week, then take 10mg (one pill) once daily. 49 tablet 2     clobetasol (TEMOVATE) 0.05 % ointment Apply thin layer twice daily to hands and feet.  Soak hands before application at bedtime. 60 g 11     FLUOXETINE HCL PO        BuPROPion HCl (WELLBUTRIN PO)           Allergies   Allergen Reactions     Bacitracin Rash     Patch testing     Glutaraldehyde Rash     2+ patch testing     Gold Rash     Mild " patch test reaction     Penicillins Rash     Sulfa Drugs Rash     Review of Systems:  -Feels otherwise well except for feeling tired, denies other skin concerns.    Physical exam:  Vitals: /77 (BP Location: Right arm, Patient Position: Chair, Cuff Size: Adult Large)  Pulse 75  GEN: This is a well developed, well-nourished female in no acute distress, in a pleasant mood.     SKIN: Focused examination of the face, hands and feet was performed.  - Erythematous hyperkeratotic plaques on the b/l medial and lateral distal aspects of the palms, b/l palmar distal thumbs, and R palmar distal 2nd finger. Fissures R 1st and 2nd distal fingers. Hyperkeratosis and fissures on the heels. Hyperkeratotic papules on the b/l medial great toes.  - Light tan mottled dyspigmentation of the forearms  - R lower leg: blue gray macules and patches at previous sites of trauma  - scalp with mild scale overlying mild macular erythema  -No other lesions of concern on areas examined.      Impression/Plan:  1. Suspected allergic contact dermatitis (ACD) of the hands and feet:  Reviewed recommendations from Dr. Quinonez today, including avoidance of leather and fragrance which she has not been doing. Pulled up CAMP list today in clinic, printed out for fariha and went over in depth.   PLAN today:  1) Diprolene- AF ointment 0.05% twice daily   2) Apply thin layer of vaniply over medication  3) Apply cotton gloves over steroid ointment and emollient combo (advised applying medication and emollient before work and then wearing cotton gloves over while driving to work, wearing cotton gloves for occlusion at night)  4) CAMP list re-printed  5) cyclosporine baseline labs: UA, CBC w/ diff, CMP, lipids, magnesium, uric acid  6) plan on cyclosporine 3mg/kg daily pending normal labs with weekly labs for the next 4 weeks (all but UA)- printed for patient to get done at [facility], will ask them to fax over results    2. Minocycline induced  hyperpigmentation: Ayer brown type of forearms and blue gray pigment deposition in scars.  PLAN today:  1) Stop minocycline; fortunately, this wasn't the magic treatment for Ms. Ibrahim's dermatitis.   2) Counseled that pigmentation should fade over time    3. Seborrheic dermatitis  PLAN today:  - DHS or Neutrogena T-sal shampoos (safe on CAMP list)     Follow-up in 1 month, earlier for new or changing lesions.     Dr. Zuniga staffed the patient.    Staff Involved:  Resident(Manuela/Staff(as above)     Yessi Roy MD  PGY-3 Dermatology  Pager: [phone]      .I, Randee Zuniga MD, saw this patient with the resident and agree with the resident s findings and plan of care as documented in the resident s note.

## 2017-03-21 RX ORDER — CYCLOSPORINE 100 MG/1
100 CAPSULE ORAL 2 TIMES DAILY
Qty: 60 CAPSULE | Refills: 1 | Status: ON HOLD | OUTPATIENT
Start: 2017-03-21 | End: 2017-07-03

## 2017-03-27 LAB
ALBUMIN SERPL-MCNC: 3.8 G/DL (ref 3.2–4.6)
ALP SERPL-CCNC: 53 U/L (ref 50–136)
ALT SERPL-CCNC: 25 U/L (ref 8–45)
ANION GAP SERPL CALCULATED.3IONS-SCNC: 6 MMOL/L (ref 5–18)
AST SERPL-CCNC: 28 U/L (ref 2–40)
BASOPHILS NFR BLD AUTO: 0.8 %
BILIRUB SERPL-MCNC: 0.4 MG/DL (ref 0.2–1.2)
BUN SERPL-MCNC: 17 MG/DL (ref 8–25)
CALCIUM SERPL-MCNC: 9.2 MG/DL (ref 8.5–10.5)
CHLORIDE SERPLBLD-SCNC: 107 MMOL/L (ref 98–110)
CHOLEST SERPL-MCNC: 232 MG/DL (ref 100–199)
CO2 SERPL-SCNC: 27 MMOL/L (ref 21–31)
CREAT SERPL-MCNC: 0.73 MG/DL (ref 0.57–1.11)
EOSINOPHIL NFR BLD AUTO: 5.5 %
ERYTHROCYTE [DISTWIDTH] IN BLOOD BY AUTOMATED COUNT: 13.9 % (ref 11.5–15.5)
GFR SERPL CREATININE-BSD FRML MDRD: >60 ML/MIN/1.73M2
GLUCOSE SERPL-MCNC: 95 MG/DL (ref 70–99)
HCT VFR BLD AUTO: 38.6 % (ref 33–51)
HDLC SERPL-MCNC: 66 MG/DL
HEMOGLOBIN: 12.8 G/DL (ref 11.7–15.7)
LDLC SERPL CALC-MCNC: 145 MG/DL
LYMPHOCYTES NFR BLD AUTO: 25.2 % (ref 20–44)
MAGNESIUM SERPL-MCNC: 2.2 MG/DL (ref 1.6–2.6)
MCH RBC QN AUTO: 31.1 PG (ref 26–34)
MCHC RBC AUTO-ENTMCNC: 33.2 G/DL (ref 32–36)
MCV RBC AUTO: 94 FL (ref 80–100)
MONOCYTES NFR BLD AUTO: 9.9 %
NEUTROPHILS NFR BLD AUTO: 58.6 % (ref 42–72)
NONHDLC SERPL-MCNC: 166 MG/DL
PLATELET COUNT - QUEST: 257 10^9/L (ref 150–450)
POTASSIUM SERPL-SCNC: 4.5 MMOL/L (ref 3.5–5)
PROT SERPL-MCNC: 6.5 G/DL (ref 6–8)
RBC # BLD AUTO: 4.11 10^12/L (ref 4–5.2)
SODIUM SERPL-SCNC: 140 MMOL/L (ref 135–145)
TRIGL SERPL-MCNC: 106 MG/DL
URATE SERPL-MCNC: 3.9 MG/DL (ref 2.6–6)
WBC # BLD AUTO: 3.9 10^9/L (ref 4.5–11)

## 2017-04-04 DIAGNOSIS — L23.3 ALLERGIC CONTACT DERMATITIS DUE TO DRUGS IN CONTACT WITH SKIN: ICD-10-CM

## 2017-04-04 DIAGNOSIS — Z79.899 MEDICATION MANAGEMENT: ICD-10-CM

## 2017-04-04 LAB
ALBUMIN SERPL-MCNC: 4 G/DL (ref 3.2–4.6)
ALP SERPL-CCNC: 56 U/L (ref 50–136)
ALT SERPL-CCNC: 19 U/L (ref 8–45)
ANION GAP SERPL CALCULATED.3IONS-SCNC: 9 MMOL/L (ref 5–18)
AST SERPL-CCNC: 19 U/L (ref 2–40)
BASOPHILS NFR BLD AUTO: 0.7 %
BILIRUB SERPL-MCNC: 0.6 MG/DL (ref 0.2–1.2)
BUN SERPL-MCNC: 17 MG/DL (ref 8–25)
CALCIUM SERPL-MCNC: 9.4 MG/DL (ref 8.5–10.5)
CHLORIDE SERPLBLD-SCNC: 106 MMOL/L (ref 98–110)
CHOLEST SERPL-MCNC: 256 MG/DL (ref 100–199)
CO2 SERPL-SCNC: 24 MMOL/L (ref 21–31)
CREAT SERPL-MCNC: 0.78 MG/DL (ref 0.57–1.11)
EOSINOPHIL NFR BLD AUTO: 8.5 %
ERYTHROCYTE [DISTWIDTH] IN BLOOD BY AUTOMATED COUNT: 13.7 % (ref 11.5–15.5)
GLUCOSE SERPL-MCNC: 102 MG/DL (ref 70–99)
HCT VFR BLD AUTO: 41.9 % (ref 33–51)
HDLC SERPL-MCNC: 66 MG/DL
HEMOGLOBIN: 14 G/DL (ref 11.7–15.7)
LDLC SERPL CALC-MCNC: 164 MG/DL
LYMPHOCYTES NFR BLD AUTO: 19.2 % (ref 20–44)
MAGNESIUM SERPL-MCNC: 2.2 MG/DL (ref 1.6–2.6)
MCH RBC QN AUTO: 31.1 PG (ref 26–34)
MCHC RBC AUTO-ENTMCNC: 33.4 G/DL (ref 32–36)
MCV RBC AUTO: 93 FL (ref 80–100)
MONOCYTES NFR BLD AUTO: 10.1 %
NEUTROPHILS NFR BLD AUTO: 61.5 % (ref 42–72)
NONHDLC SERPL-MCNC: 190 MG/DL
PLATELET COUNT - QUEST: 274 10^9/L (ref 150–450)
POTASSIUM SERPL-SCNC: 4.7 MMOL/L (ref 3.5–5)
PROT SERPL-MCNC: 6.8 G/DL (ref 6–8)
RBC # BLD AUTO: 4.5 10^12/L (ref 4–5.2)
SODIUM SERPL-SCNC: 139 MMOL/L (ref 135–145)
TRIGL SERPL-MCNC: 129 MG/DL
URATE SERPL-MCNC: 4.3 MG/DL (ref 2.6–6)
WBC # BLD AUTO: 4.3 10^9/L (ref 4.5–11)

## 2017-04-17 ENCOUNTER — OFFICE VISIT (OUTPATIENT)
Dept: DERMATOLOGY | Facility: CLINIC | Age: 67
End: 2017-04-17

## 2017-04-17 ENCOUNTER — TRANSFERRED RECORDS (OUTPATIENT)
Dept: HEALTH INFORMATION MANAGEMENT | Facility: CLINIC | Age: 67
End: 2017-04-17

## 2017-04-17 VITALS — SYSTOLIC BLOOD PRESSURE: 131 MMHG | HEART RATE: 75 BPM | DIASTOLIC BLOOD PRESSURE: 88 MMHG

## 2017-04-17 DIAGNOSIS — L20.9 ATOPIC DERMATITIS, UNSPECIFIED TYPE: ICD-10-CM

## 2017-04-17 DIAGNOSIS — Z79.899 MEDICATION MANAGEMENT: ICD-10-CM

## 2017-04-17 DIAGNOSIS — L30.9 DERMATITIS: Primary | ICD-10-CM

## 2017-04-17 DIAGNOSIS — L20.9 ATOPIC DERMATITIS, UNSPECIFIED TYPE: Primary | ICD-10-CM

## 2017-04-17 DIAGNOSIS — L23.3 ALLERGIC CONTACT DERMATITIS DUE TO DRUGS IN CONTACT WITH SKIN: ICD-10-CM

## 2017-04-17 LAB
ALBUMIN SERPL-MCNC: 3.8 G/DL (ref 3.4–5)
ALP SERPL-CCNC: 71 U/L (ref 40–150)
ALT SERPL W P-5'-P-CCNC: 22 U/L (ref 0–50)
ANION GAP SERPL CALCULATED.3IONS-SCNC: 8 MMOL/L (ref 3–14)
AST SERPL W P-5'-P-CCNC: 19 U/L (ref 0–45)
BASOPHILS # BLD AUTO: 0 10E9/L (ref 0–0.2)
BASOPHILS NFR BLD AUTO: 0.5 %
BILIRUB SERPL-MCNC: 0.7 MG/DL (ref 0.2–1.3)
BUN SERPL-MCNC: 18 MG/DL (ref 7–30)
CALCIUM SERPL-MCNC: 9.2 MG/DL (ref 8.5–10.1)
CHLORIDE SERPL-SCNC: 104 MMOL/L (ref 94–109)
CHOLEST SERPL-MCNC: 276 MG/DL
CO2 SERPL-SCNC: 26 MMOL/L (ref 20–32)
CREAT SERPL-MCNC: 0.68 MG/DL (ref 0.52–1.04)
DIFFERENTIAL METHOD BLD: NORMAL
EOSINOPHIL # BLD AUTO: 0.4 10E9/L (ref 0–0.7)
EOSINOPHIL NFR BLD AUTO: 5.8 %
ERYTHROCYTE [DISTWIDTH] IN BLOOD BY AUTOMATED COUNT: 13.1 % (ref 10–15)
GFR SERPL CREATININE-BSD FRML MDRD: 87 ML/MIN/1.7M2
GLUCOSE SERPL-MCNC: 90 MG/DL (ref 70–99)
HCT VFR BLD AUTO: 42.5 % (ref 35–47)
HDLC SERPL-MCNC: 86 MG/DL
HGB BLD-MCNC: 14.3 G/DL (ref 11.7–15.7)
IMM GRANULOCYTES # BLD: 0 10E9/L (ref 0–0.4)
IMM GRANULOCYTES NFR BLD: 0.2 %
LDLC SERPL CALC-MCNC: 152 MG/DL
LYMPHOCYTES # BLD AUTO: 1.3 10E9/L (ref 0.8–5.3)
LYMPHOCYTES NFR BLD AUTO: 20.7 %
MAGNESIUM SERPL-MCNC: 2.1 MG/DL (ref 1.6–2.3)
MCH RBC QN AUTO: 31.4 PG (ref 26.5–33)
MCHC RBC AUTO-ENTMCNC: 33.6 G/DL (ref 31.5–36.5)
MCV RBC AUTO: 93 FL (ref 78–100)
MONOCYTES # BLD AUTO: 0.6 10E9/L (ref 0–1.3)
MONOCYTES NFR BLD AUTO: 9.2 %
NEUTROPHILS # BLD AUTO: 3.9 10E9/L (ref 1.6–8.3)
NEUTROPHILS NFR BLD AUTO: 63.6 %
NONHDLC SERPL-MCNC: 191 MG/DL
NRBC # BLD AUTO: 0 10*3/UL
NRBC BLD AUTO-RTO: 0 /100
PLATELET # BLD AUTO: 252 10E9/L (ref 150–450)
POTASSIUM SERPL-SCNC: 4.3 MMOL/L (ref 3.4–5.3)
PROT SERPL-MCNC: 7.2 G/DL (ref 6.8–8.8)
RBC # BLD AUTO: 4.55 10E12/L (ref 3.8–5.2)
SODIUM SERPL-SCNC: 138 MMOL/L (ref 133–144)
TRIGL SERPL-MCNC: 192 MG/DL
URATE SERPL-MCNC: 3.7 MG/DL (ref 2.6–6)
WBC # BLD AUTO: 6.2 10E9/L (ref 4–11)

## 2017-04-17 RX ORDER — BETAMETHASONE DIPROPIONATE 0.5 MG/G
OINTMENT, AUGMENTED TOPICAL
Qty: 50 G | Refills: 3 | Status: SHIPPED | OUTPATIENT
Start: 2017-04-17 | End: 2017-10-02

## 2017-04-17 RX ORDER — METHOCARBAMOL 500 MG/1
500 TABLET, FILM COATED ORAL
Status: ON HOLD | COMMUNITY
Start: 2017-04-11 | End: 2017-07-03

## 2017-04-17 RX ORDER — NAPROXEN 500 MG/1
500 TABLET ORAL
COMMUNITY
Start: 2017-04-11

## 2017-04-17 ASSESSMENT — PAIN SCALES - GENERAL: PAINLEVEL: MILD PAIN (3)

## 2017-04-17 NOTE — PROGRESS NOTES
"[facility] Dermatology Note      Dermatology Problem List:  1. Suspected ACD hands and feet:  BX 4/2014 showed spongiotic dermatitis with eos on hands:  DDx ACD vs atopic dermatitis vs numular dermatitis   -TREATMENT FAILURES:  nb uvb, MMF, acitretin, methotrexate (all prior to presenting at the ), minocycline ( 8/2016-3/20/17), prednisone, clobetasol 0.05% ointment, CSA 3mg/kg/day  -[place] [facility] [place]  2+ glutaraldehyde; mild: gold and Lillian soft moisturizing cream. Doubtful: fragrance mix 2, dodecyl gallate (both of these attributed to irritant reaction and not considered true allergens). Previous positives to bacitracin, bronopol, and mercapto mix were not reproducible; Dr. Quinonez recommended avoidance of bacitracin. Avoid leather.  -Prior treatments  -Cannot use dapsone due to sulfa allergy, cannot due UVA1 due to work schedule  -  Start dupilumab 4/17/2017    Encounter Date: Apr 17, 2017    CC:   Chief Complaint   Patient presents with     Derm Problem     Dermatitis follow up, amarilis states \" my cuts just dont heal.\"       History of Present Illness:  Ms. Amarilis Ibrahim is a 66 year old female who presents for follow-up of suspected allergic contact dermatitis of hands and feet.  Her disease is still active- cracking on fingers. SHe notes bleeding from her fingers.     Last seen 3/20/17 and started on current regimen:  1) Diprolene- AF ointment 0.05% twice daily   2) Apply thin layer of vaniply over medication  3) Apply cotton gloves over steroid ointment and emollient combo (but this causes itching and doesn't work).  4) cyclosporine  3mg/kg daily  (labs checked at [facility])      She notes the only thing that has ever helped was high doses of prednisone, but her dermatitis flares when shes on less than 10mg/d. She doesn't think summer months show improvement. She is frustrated and is begging for anything that will help her symptoms.               Past Medical History: "   There is no problem list on file for this patient.    Past Medical History:   Diagnosis Date     Depression      Hand dermatitis      Past Surgical History:   Procedure Laterality Date     FOOT SURGERY       Social History:  The patient works as a  at [employer]. The patient denies use of tanning beds.    Family History:  There is no family history of asthma, eczema or allergies.    Medications:  Current Outpatient Prescriptions   Medication Sig Dispense Refill     methocarbamol (ROBAXIN) 500 MG tablet Take 500 mg by mouth       naproxen (NAPROSYN) 500 MG tablet Take 500 mg by mouth       augmented betamethasone dipropionate (DIPROLENE-AF) 0.05 % ointment Apply to affected areas of hand twice daily, everyday. Use Vaniply on top of this medication. Occlude with gloves at night. 50 g 3     tiZANidine (ZANAFLEX) 2 MG tablet Take 2 mg by mouth Reported on 3/20/2017       FLUOXETINE HCL PO        BuPROPion HCl (WELLBUTRIN PO)        GENGRAF 100 MG PO CAPSULE Take 1 capsule (100 mg) by mouth 2 times daily 60 capsule 1     minocycline (DYNACIN) 100 MG tablet Take 1 tablet (100 mg) by mouth 2 times daily 60 tablet 2     predniSONE (DELTASONE) 10 MG tablet Take by mouth 20mg (2 pills) once daily for one week, then take 10mg (one pill) once daily. (Patient not taking: Reported on 3/20/2017) 49 tablet 2     clobetasol (TEMOVATE) 0.05 % ointment Apply thin layer twice daily to hands and feet.  Soak hands before application at bedtime. 60 g 11        Allergies   Allergen Reactions     Bacitracin Rash     Patch testing     Glutaraldehyde Rash     2+ patch testing     Gold Rash     Mild patch test reaction     Penicillins Rash     Sulfa Drugs Rash     Review of Systems:  -Feels otherwise well except for feeling tired, denies other skin concerns.    Physical exam:  Vitals: /88  Pulse 75  GEN: This is a well developed, well-nourished female in no acute distress, in a pleasant mood.     SKIN: Focused examination of the  face, hands  was performed.  - Discrete erythematous hyperkeratotic plaques on the b/l medial and lateral distal aspects of the palms, b/l palmar distal thumbs, and R palmar distal 2nd finger. Fissures on the distal fingers.      Impression/Plan:  1. Chronic dermatitis of the hands:  Has had numerous treatment failures in the past:    TREATMENT FAILURES:  nb uvb, MMF, acitretin, methotrexate (all prior to presenting at the ), minocycline ( 8/2016-3/20/17), prednisone, clobetasol 0.05% ointment, CSA 3mg/kg/day    PLAN today:  Given numerous treatment failures, recommendation is to try dupilumab. Risks and benefits such as risk of conjunctivitis discussed.  - quant gold ordered for today, but otherwise no laboratory monitoring  - discussed that it can take up to 12 weeks to see improvement.  - For now continue:   1) Diprolene- AF ointment 0.05% twice daily    2) Apply thin layer of vaniply over medication   3) Apply cotton gloves over steroid ointment and emollient combo (advised applying medication and emollient before work and then wearing  cotton gloves over while driving to work, wearing cotton gloves for occlusion at night)        Follow-up in 1 month, earlier for new or changing lesions.     Dr. Zuniga staffed the patient.    Staff Involved:  Resident(Ciro)/Staff(as above)        .I, Randee Zuniga MD, saw this patient with the resident and agree with the resident s findings and plan of care as documented in the resident s note.

## 2017-04-17 NOTE — MR AVS SNAPSHOT
After Visit Summary   4/17/2017    Kasey Ibrahim    MRN: [id number]           Patient Information     Date Of Birth          1950        Visit Information        Provider Department      4/17/2017 1:15 PM Randee Zuniga MD [facility] Dermatology        Today's Diagnoses     Atopic dermatitis, unspecified type    -  1    Allergic contact dermatitis due to drugs in contact with skin           Follow-ups after your visit        Follow-up notes from your care team     Return in about 4 weeks (around 5/15/2017).      Your next 10 appointments already scheduled     May 15, 2017  1:00 PM CDT   (Arrive by 12:45 PM)   Return Visit with Randee Zuniga MD   [facility] Dermatology ([facility])    [address] [facility] [address] [phone]              Future tests that were ordered for you today     Open Future Orders        Priority Expected Expires Ordered    M Tuberculosis by Quantiferon Routine  4/17/2018 4/17/2017            Who to contact     Please call your clinic at [phone] to:    Ask questions about your health    Make or cancel appointments    Discuss your medicines    Learn about your test results    Speak to your doctor   If you have compliments or concerns about an experience at your clinic, or if you wish to file a complaint, please contact [facility] Patient Relations at [phone] or email us at [email or web address]         Additional Information About Your Visit        MyChart Information     Alma Johnshart gives you secure access to your electronic health record. If you see a primary care provider, you can also send messages to your care team and make appointments. If you have questions, please call your primary care clinic.  If you do not have a primary care provider, please call [phone] and they will assist you.      MogiMe is an electronic gateway that provides easy, online  access to your medical records. With GetThis, you can request a clinic appointment, read your test results, renew a prescription or communicate with your care team.     To access your existing account, please contact your [facility] or call [phone] for assistance.        Care EveryWhere ID     This is your Care EveryWhere ID. This could be used by other organizations to access your [place] medical records  [id number]        Your Vitals Were     Pulse                   75            Blood Pressure from Last 3 Encounters:   04/17/17 131/88   03/20/17 125/77   01/09/17 139/90    Weight from Last 3 Encounters:   03/20/17 80.6 kg (177 lb 12.8 oz)                 Today's Medication Changes          These changes are accurate as of: 4/17/17  2:02 PM.  If you have any questions, ask your nurse or doctor.               Stop taking these medicines if you haven't already. Please contact your care team if you have questions.     predniSONE 10 MG tablet   Commonly known as:  DELTASONE   Stopped by:  Randee Zuniga MD                Where to get your medicines      These medications were sent to [facility] [place], [place] - [address]   [address] [facility] [address]     Phone:  [phone]     augmented betamethasone dipropionate 0.05 % ointment                Primary Care Provider Office Phone # Fax #    Ankit Dominguez Martini [phone]       [facility] [address] [facility] [address]        Thank you!     Thank you for choosing [facility] DERMATOLOGY  for your care. Our goal is always to provide you with excellent care. Hearing back from our patients is one way we can continue to improve our services. Please take a few minutes to complete the written survey that you may receive in the mail after your visit with us. Thank you!             Your Updated Medication List - Protect others around you: Learn how to safely use,  store and throw away your medicines at www.disposemymeds.org.          This list is accurate as of: 4/17/17  2:02 PM.  Always use your most recent med list.                   Brand Name Dispense Instructions for use    augmented betamethasone dipropionate 0.05 % ointment    DIPROLENE-AF    50 g    Apply to affected areas of hand twice daily, everyday. Use Vaniply on top of this medication. Occlude with gloves at night.       clobetasol 0.05 % ointment    TEMOVATE    60 g    Apply thin layer twice daily to hands and feet.  Soak hands before application at bedtime.       cycloSPORINE modified capsule     60 capsule    Take 1 capsule (100 mg) by mouth 2 times daily       FLUOXETINE HCL PO          methocarbamol 500 MG tablet    ROBAXIN     Take 500 mg by mouth       minocycline 100 MG tablet    DYNACIN    60 tablet    Take 1 tablet (100 mg) by mouth 2 times daily       naproxen 500 MG tablet    NAPROSYN     Take 500 mg by mouth       tiZANidine 2 MG tablet    ZANAFLEX     Take 2 mg by mouth Reported on 3/20/2017       WELLBUTRIN PO

## 2017-04-17 NOTE — LETTER
"4/17/2017       RE: Amarilis Ibrahim  [address] [facility] [address]     Dear Colleague,    Thank you for referring your patient, Amarilis Ibrahim, to the [facility] DERMATOLOGY at [facility]. Please see a copy of my visit note below.    [facility] Dermatology Note      Dermatology Problem List:  1. Suspected ACD hands and feet:  BX 4/2014 showed spongiotic dermatitis with eos on hands:  DDx ACD vs atopic dermatitis vs numular dermatitis   -TREATMENT FAILURES:  nb uvb, MMF, acitretin, methotrexate (all prior to presenting at the ), minocycline ( 8/2016-3/20/17), prednisone, clobetasol 0.05% ointment, CSA 3mg/kg/day  -Previous patch testing 8/2013 at [facility] [place]:  2+ glutaraldehyde; mild: gold and Lillian soft moisturizing cream. Doubtful: fragrance mix 2, dodecyl gallate (both of these attributed to irritant reaction and not considered true allergens). Previous positives to bacitracin, bronopol, and mercapto mix were not reproducible; Dr. Quinonez recommended avoidance of bacitracin. Avoid leather.  -Prior treatments  -Cannot use dapsone due to sulfa allergy, cannot due UVA1 due to work schedule  -  Start dupilumab 4/17/2017    Encounter Date: Apr 17, 2017    CC:   Chief Complaint   Patient presents with     Derm Problem     Dermatitis follow up, amarilis states \" my cuts just dont heal.\"       History of Present Illness:  Ms. Amarilis Ibrahim is a 66 year old female who presents for follow-up of suspected allergic contact dermatitis of hands and feet.  Her disease is still active- cracking on fingers. SHe notes bleeding from her fingers.     Last seen 3/20/17 and started on current regimen:  1) Diprolene- AF ointment 0.05% twice daily   2) Apply thin layer of vaniply over medication  3) Apply cotton gloves over steroid ointment and emollient combo (but this causes itching and doesn't work).  4) cyclosporine  3mg/kg daily  (labs checked at [facility])      She notes " the only thing that has ever helped was high doses of prednisone, but her dermatitis flares when shes on less than 10mg/d. She doesn't think summer months show improvement. She is frustrated and is begging for anything that will help her symptoms.               Past Medical History:   There is no problem list on file for this patient.    Past Medical History:   Diagnosis Date     Depression      Hand dermatitis      Past Surgical History:   Procedure Laterality Date     FOOT SURGERY       Social History:  The patient works as a  at [employer]. The patient denies use of tanning beds.    Family History:  There is no family history of asthma, eczema or allergies.    Medications:  Current Outpatient Prescriptions   Medication Sig Dispense Refill     methocarbamol (ROBAXIN) 500 MG tablet Take 500 mg by mouth       naproxen (NAPROSYN) 500 MG tablet Take 500 mg by mouth       augmented betamethasone dipropionate (DIPROLENE-AF) 0.05 % ointment Apply to affected areas of hand twice daily, everyday. Use Vaniply on top of this medication. Occlude with gloves at night. 50 g 3     tiZANidine (ZANAFLEX) 2 MG tablet Take 2 mg by mouth Reported on 3/20/2017       FLUOXETINE HCL PO        BuPROPion HCl (WELLBUTRIN PO)        GENGRAF 100 MG PO CAPSULE Take 1 capsule (100 mg) by mouth 2 times daily 60 capsule 1     minocycline (DYNACIN) 100 MG tablet Take 1 tablet (100 mg) by mouth 2 times daily 60 tablet 2     predniSONE (DELTASONE) 10 MG tablet Take by mouth 20mg (2 pills) once daily for one week, then take 10mg (one pill) once daily. (Patient not taking: Reported on 3/20/2017) 49 tablet 2     clobetasol (TEMOVATE) 0.05 % ointment Apply thin layer twice daily to hands and feet.  Soak hands before application at bedtime. 60 g 11        Allergies   Allergen Reactions     Bacitracin Rash     Patch testing     Glutaraldehyde Rash     2+ patch testing     Gold Rash     Mild patch test reaction     Penicillins Rash     Sulfa Drugs  Rash     Review of Systems:  -Feels otherwise well except for feeling tired, denies other skin concerns.    Physical exam:  Vitals: /88  Pulse 75  GEN: This is a well developed, well-nourished female in no acute distress, in a pleasant mood.     SKIN: Focused examination of the face, hands  was performed.  - Discrete erythematous hyperkeratotic plaques on the b/l medial and lateral distal aspects of the palms, b/l palmar distal thumbs, and R palmar distal 2nd finger. Fissures on the distal fingers.      Impression/Plan:  1. Chronic dermatitis of the hands:  Has had numerous treatment failures in the past:    TREATMENT FAILURES:  nb uvb, MMF, acitretin, methotrexate (all prior to presenting at the ), minocycline ( 8/2016-3/20/17), prednisone, clobetasol 0.05% ointment, CSA 3mg/kg/day    PLAN today:  Given numerous treatment failures, recommendation is to try dupilumab. Risks and benefits such as risk of conjunctivitis discussed.  - quant gold ordered for today, but otherwise no laboratory monitoring  - discussed that it can take up to 12 weeks to see improvement.  - For now continue:   1) Diprolene- AF ointment 0.05% twice daily    2) Apply thin layer of vaniply over medication   3) Apply cotton gloves over steroid ointment and emollient combo (advised applying medication and emollient before work and then wearing  cotton gloves over while driving to work, wearing cotton gloves for occlusion at night)        Follow-up in 1 month, earlier for new or changing lesions.     Dr. Zuniga staffed the patient.    Staff Involved:  Resident(Ciro)/Staff(as above)        .I, Randee Zuniga MD, saw this patient with the resident and agree with the resident s findings and plan of care as documented in the resident s note.      Again, thank you for allowing me to participate in the care of your patient.      Sincerely,    Randee Zuniga MD

## 2017-04-17 NOTE — TELEPHONE ENCOUNTER
PA Initiation    Medication: dupilumab (Dupixent) 300mg/ 2mL PFS  Insurance Company: [employer] ([employer]) - Phone [phone] Fax [phone]  Pharmacy Filling the Rx: [facility] - [address] [place] [address]  Filling Pharmacy Phone: [phone]  Filling Pharmacy Fax:    Start Date: 4/17/2017    ** Met pt in clinic at [facility], has tried and failed most all systemic/ topical first lines, pt on commercial ins for now, verbally auth copay card, will start PA (pt appears to be restricted to [facility]), gave handout on pt info on drug as well as their support program, pt prefers phone calls for updates

## 2017-04-19 LAB
M TB TUBERC IFN-G BLD QL: NEGATIVE
M TB TUBERC IFN-G/MITOGEN IGNF BLD: 0.01 IU/ML

## 2017-04-24 NOTE — TELEPHONE ENCOUNTER
Calling [employer] at [phone] opt 2 then opt 2 to check status of pt's Dupixent PA opt 1 with PA case ID# [id number]. Per rep case was just approved today, good 3/18/17- 8/7/17. Will await fax.

## 2017-04-25 NOTE — TELEPHONE ENCOUNTER
Called [facility] at [phone] and confirmed they rec'd everything faxed in yesterday. They did and rx is in process. Pt will receive a call when ready.

## 2017-04-27 NOTE — TELEPHONE ENCOUNTER
Patient called clinic. Informed patient she has been approved for [employer].    Provided pharmacy phone number.      Meme Abel RN

## 2017-04-28 ENCOUNTER — TELEPHONE (OUTPATIENT)
Dept: DERMATOLOGY | Facility: CLINIC | Age: 67
End: 2017-04-28

## 2017-04-28 NOTE — TELEPHONE ENCOUNTER
Pt called facility and was given lab results.  Pt states she has been approved and will be starting the medication once it is delivered.

## 2017-05-02 ENCOUNTER — TELEPHONE (OUTPATIENT)
Dept: DERMATOLOGY | Facility: CLINIC | Age: 67
End: 2017-05-02

## 2017-05-02 NOTE — TELEPHONE ENCOUNTER
Patient called wanting to inform Dr. Zuniga that she is unable to start with Dupilumab at this time. She was recently discharged from the hospital upon discharged she was advised not to start Dupilumab at this time.     She has an infection of the right leg from the knee. Severe cellulitis.     She called [facility] and canceled the Rx    Meme Abel RN

## 2017-05-03 NOTE — TELEPHONE ENCOUNTER
"Kasey stated  \"since I was in the hospital for three days for this severe cellulitis I'm going to hold on the medication until my follow up with Dr. Zuniga\"      She could not communicate a set time frame the was advised by the Hospital discharging MD. They advised her to stop while she's being treated for cellulitis.     Meme Abel RN     "

## 2017-05-24 ENCOUNTER — TELEPHONE (OUTPATIENT)
Dept: DERMATOLOGY | Facility: CLINIC | Age: 67
End: 2017-05-24

## 2017-05-24 NOTE — TELEPHONE ENCOUNTER
Kasey called today because the rash on her hands are getting worse, she spoke with her pcp who thinks it's best to start the Dupilumab now. Kasey would like to have Dr. Zuniga resend over the rx for that injection.   Dr. Zuniga do you want to start Kasey on the Dupilumab now?

## 2017-05-25 NOTE — TELEPHONE ENCOUNTER
Patient would like to know if she needs a nurses visit for to learn to use Dupilumab. She states her  has given her other injections in the stomach.    Will forward to Dr. Squires nurse.  If patient needs a nurses visit. Please call and schedule patient.       Meme Abel RN

## 2017-05-25 NOTE — TELEPHONE ENCOUNTER
I attempted to call Kasey but she did not answer. If pt calls back please relay message below.    Patricia Hwang   to Randee Zuniga MD   Me       9:36 AM   Good morning,     Yes, Kasey' prescription at [facility] is still valid - it looks like she never filled it because her primary MD advised her not to start it when she had cellulitis. She just has to call them to set up the order. [phone] ([phone])     Thank you!     Patricia

## 2017-05-30 NOTE — TELEPHONE ENCOUNTER
Patient called back to schedule her nurse training visit. She has not talked to [facility] yet so she would like to know what she is supposed to di with that.  Hoping pharmacy liason can call tomorrow.  Patient will be at work but she would like us to call her  Chon at [phone] and tell him what the next steps are.    Ameena Galdamez  Clinic Manager

## 2017-05-30 NOTE — TELEPHONE ENCOUNTER
I called to schedule Kasey for a nurse appt to do injection training. I left a  for her to call us back to get scheduled.

## 2017-05-31 NOTE — TELEPHONE ENCOUNTER
Spoke to pt  per below request, gave him pharmacy ph# for pt to call ([phone]). Confirmed with pharmacy that everything is ready to go she just has to speak with them and schedule delivery. He will pass the message and ph# along to Kasey. Advised to call with further questions.

## 2017-06-01 DIAGNOSIS — L30.9 DERMATITIS: ICD-10-CM

## 2017-06-01 RX ORDER — DUPILUMAB 300 MG/2ML
INJECTION, SOLUTION SUBCUTANEOUS
Qty: 4 ML | Refills: 1 | Status: ON HOLD | OUTPATIENT
Start: 2017-06-01 | End: 2017-07-03

## 2017-06-01 NOTE — TELEPHONE ENCOUNTER
Last visit: 4/17/17  Next visit: 8/21/17  Nurse visit 6/5/17 for injection training     Impression/Plan:  1. Chronic dermatitis of the hands:  Has had numerous treatment failures in the past:  TREATMENT FAILURES:  nb uvb, MMF, acitretin, methotrexate (all prior to presenting at the ), minocycline ( 8/2016-3/20/17), prednisone, clobetasol 0.05% ointment, CSA 3mg/kg/day     PLAN today:  Given numerous treatment failures, recommendation is to try dupilumab. Risks and benefits such as risk of conjunctivitis discussed.  - quant gold ordered for today, but otherwise no laboratory monitoring  - discussed that it can take up to 12 weeks to see improvement.  - For now continue:                        1) Diprolene- AF ointment 0.05% twice daily                         2) Apply thin layer of vaniply over medication                        3) Apply cotton gloves over steroid ointment and emollient combo (advised applying medication and emollient before work and then wearing             cotton gloves over while driving to work, wearing cotton gloves for occlusion at night)          Follow-up in 1 month, earlier for new or changing lesions.

## 2017-06-02 NOTE — TELEPHONE ENCOUNTER
Called [facility] and pt got delivery of Dupixent yesterday. Thank you!    Patricia

## 2017-06-05 ENCOUNTER — ALLIED HEALTH/NURSE VISIT (OUTPATIENT)
Dept: DERMATOLOGY | Facility: CLINIC | Age: 67
End: 2017-06-05

## 2017-06-05 DIAGNOSIS — Z71.89 ENCOUNTER FOR INJECTION EDUCATION: Primary | ICD-10-CM

## 2017-06-05 DIAGNOSIS — L20.89 OTHER ATOPIC DERMATITIS: Primary | ICD-10-CM

## 2017-06-05 NOTE — TELEPHONE ENCOUNTER
[facility] is calling for order clarification.     Patient has order for multiple refills of starting dose of DUPIXENT. She needs an order for maintenance dosing for DUPIXENT. Starting dose would need to be changed to 0 refills.     Please call  [phone]  Once completed   Meme Abel RN

## 2017-06-05 NOTE — PROGRESS NOTES
Relevant Diagnosis:  Dermatitis    Teaching Topic:  Family/Patient teaching of administering injection    Person(s) involved in teaching:  Patient and Family    Motivation Level:   Asks Questions:   Yes  Eager to Learn:  Yes  Cooperative:  Yes  Receptive (willing/able to accept information):  Yes  Comments: Pt had reviewed dupixent educational material prior to visit.    Patient and Family demonstrates understanding of the following:  Reason for the appointment, diagnosis and treatment plan:  Yes  Knowledge of proper use of medications and conditions for which they are ordered (with special attention to potential side effects or drug interactions):  Yes  Which situations necessitate calling provider and whom to contact:  Yes (signs of infection, temp > 101, recurrent bouts of illness or infection or if patient has been put on an antibiotic)    Teaching Concerns:  No.  Demonstration was given by nurse with patient's  doing return demonstrations using practice supplies until comfortable     Proper use and care of Syringes:  Yes  Nutritional needs and diet plan:  N/A  Pain management techniques:  Yes  Patient instructed on hand hygiene:  Yes  How and/when to access community resources:  Yes      Infection Prevention:  Patient and Family demonstrates understanding of the following:  Signs and symptoms of infection taught:  Yes      Instructional Materials Used/Given: Dupixent patient starter kit      Time spent teaching with patient:  Spent 35 minutes with patient teaching proper technique and observation of patient performing self injection. Answered all of the patient s questions to his satisfaction.

## 2017-06-05 NOTE — MR AVS SNAPSHOT
After Visit Summary   6/5/2017    Kasey Ibrahim    MRN: [id number]           Patient Information     Date Of Birth          1950        Visit Information        Provider Department      6/5/2017 10:00 AM Nurse, [facility] Dermatology        Today's Diagnoses     Encounter for injection education    -  1       Follow-ups after your visit        Your next 10 appointments already scheduled     Aug 21, 2017 10:15 AM CDT   (Arrive by 10:00 AM)   Return Visit with Randee Zuniga MD   [facility] Dermatology ([facility])    [address] [place] [address] [phone]              Who to contact     Please call your clinic at [phone] to:    Ask questions about your health    Make or cancel appointments    Discuss your medicines    Learn about your test results    Speak to your doctor   If you have compliments or concerns about an experience at your clinic, or if you wish to file a complaint, please contact [facility] Patient Relations at [phone] or email us at [email or web address]         Additional Information About Your Visit        MyChart Information     [employer] gives you secure access to your electronic health record. If you see a primary care provider, you can also send messages to your care team and make appointments. If you have questions, please call your primary care clinic.  If you do not have a primary care provider, please call [phone] and they will assist you.      [employer] is an electronic gateway that provides easy, online access to your medical records. With [employer], you can request a clinic appointment, read your test results, renew a prescription or communicate with your care team.     To access your existing account, please contact your [facility] or call [phone] for assistance.        Care EveryWhere ID     This is your Care  EveryWhere ID. This could be used by other organizations to access your [facility] medical records  [id number]         Blood Pressure from Last 3 Encounters:   04/17/17 131/88   03/20/17 125/77   01/09/17 139/90    Weight from Last 3 Encounters:   03/20/17 80.6 kg (177 lb 12.8 oz)              Today, you had the following     No orders found for display       Primary Care Provider Office Phone # Fax #    Ankit Martini [phone]       [facility] [address] [facility] [address]        Thank you!     Thank you for choosing [facility] DERMATOLOGY  for your care. Our goal is always to provide you with excellent care. Hearing back from our patients is one way we can continue to improve our services. Please take a few minutes to complete the written survey that you may receive in the mail after your visit with us. Thank you!             Your Updated Medication List - Protect others around you: Learn how to safely use, store and throw away your medicines at www.disposemymeds.org.          This list is accurate as of: 6/5/17 11:10 AM.  Always use your most recent med list.                   Brand Name Dispense Instructions for use    augmented betamethasone dipropionate 0.05 % ointment    DIPROLENE-AF    50 g    Apply to affected areas of hand twice daily, everyday. Use Vaniply on top of this medication. Occlude with gloves at night.       clobetasol 0.05 % ointment    TEMOVATE    60 g    Apply thin layer twice daily to hands and feet.  Soak hands before application at bedtime.       cycloSPORINE modified capsule     60 capsule    Take 1 capsule (100 mg) by mouth 2 times daily       DUPIXENT 300 MG/2ML Sosy   Generic drug:  Dupilumab     4 mL    INJECT THE CONTENTS OF  MG SYRINGES UNDER THE SKIN ONCE FOR LOADING DOSE (INJECT AT 2 DIFFERENT SITES)       FLUOXETINE HCL PO          methocarbamol 500 MG tablet    ROBAXIN     Take 500 mg by mouth       minocycline 100 MG tablet     DYNACIN    60 tablet    Take 1 tablet (100 mg) by mouth 2 times daily       naproxen 500 MG tablet    NAPROSYN     Take 500 mg by mouth       tiZANidine 2 MG tablet    ZANAFLEX     Take 2 mg by mouth Reported on 3/20/2017       WELLBUTRIN PO

## 2017-06-16 ENCOUNTER — TELEPHONE (OUTPATIENT)
Dept: DERMATOLOGY | Facility: CLINIC | Age: 67
End: 2017-06-16

## 2017-06-16 NOTE — TELEPHONE ENCOUNTER
Patient calling as she wants Dr. Zuniga to know she received one Dupixent injection two weeks ago.  She turned 67 since then and now is on Medicare so she has been told that she is no longer a candidate for any of the special programs that help with cost.  She wants to know if there is anything else she can try; she feels like her hands have gotten worse.    Patient will be availble on Monday June 19th, but if someone cannot call her back then, she would like them to call her 's cell [phone].    Ameena Galdamez  Clinic Manager

## 2017-06-19 NOTE — TELEPHONE ENCOUNTER
Liaison called pt regarding Medicare high cost / lack of copay assistance on Dupixent. Pt states she already tried the  support program and the pharmacy (grants/ assistance) and nothing is available. I was aware Dupixent was lacking a hardship program for Medicare pts. Pt doesn't think drug worked for her anyway. Wants something different/ cheaper from Dr. ESCAMILLA. Advised pt to call clinic and speak to nurse about this.    [facility] inflam conditions ph#: [phone]  Dupixent [facility] support program: 844-DUPIXENT

## 2017-06-21 ENCOUNTER — TRANSFERRED RECORDS (OUTPATIENT)
Dept: HEALTH INFORMATION MANAGEMENT | Facility: CLINIC | Age: 67
End: 2017-06-21

## 2017-06-23 ENCOUNTER — TELEPHONE (OUTPATIENT)
Dept: DERMATOLOGY | Facility: CLINIC | Age: 67
End: 2017-06-23

## 2017-06-23 PROBLEM — K83.09 CHOLANGITIS (H): Status: ACTIVE | Noted: 2017-06-23

## 2017-06-23 NOTE — TELEPHONE ENCOUNTER
Kasey called stating the dupixent is no longer covered under her new insurance. States her hands are cracking, peeling and extremely painful. States she has to work with her hands. Laura is requesting a script for prednisone. States Dr. Zuniga has given it to her in the past. She would like it sent to [facility]. Sent to the [facility].

## 2017-06-26 ENCOUNTER — TELEPHONE (OUTPATIENT)
Dept: DERMATOLOGY | Facility: CLINIC | Age: 67
End: 2017-06-26

## 2017-06-26 NOTE — TELEPHONE ENCOUNTER
Patient called in requesting prednisone for chronic and debilitating hand dermatitis; recent plan for Dupixent failed due to lack of insurance coverage. Patient is having two surgeries in the next month for cataracts. After discussing potential risks, elected not to prescribe prednisone at this point. Patient has f/u scheduled with Dr. Zuniga for 8/21/2017. She will call in sooner if questions or concerns.    Jamel Vieyra MD  Dermatology Resident, PGY2  Pager: [phone]

## 2017-06-27 NOTE — H&P
[facility]  TOTAL EYE CARE  [address]  [facility] [address]  [phone]  Dept: [phone]    OPHTHALMOLOGY PRE-OPERATIVE  HISTORY AND PHYSICAL    DATE OF H/P:  2017    DATE OF SURGERY:  July 3, 2017  PROCEDURE:  Procedure(s):  PHACOEMULSIFICATION WITH STANDARD INTRAOCULAR LENS IMPLANT, Left Eye  LENS IMPLANT:  ZCB00 +18.0  REFRACTIVE GOAL:  PL Sph  SURGEON:  Dominguez Payne MD    ANESTHESIA:  TOPICAL / MAC    OR CASE REQUIREMENTS:    DEMOGRAPHICS:  Demographic Information on Kasey Ibrahim:    Kasey Ibrahim  Gender: female  : 1950  [address]  [facility] [id number] [phone] (home)     Medical Record: [id number]  Social Security Number: [id number]  Pharmacy:    [facility] [address] [place], [place] - [address]  Primary Care Provider: Ankit Martini    Parent's names are: Data Unavailable (mother) and Data Unavailable (father).    Insurance: Payor: BCBS / Plan: BCBS PLATINUM BLUE / Product Type: PPO /     OCULAR HISTORY:  Cataracts    HISTORIES:  Past Medical History:   Diagnosis Date     Depression      Hand dermatitis        Past Surgical History:   Procedure Laterality Date     FOOT SURGERY         Family History   Problem Relation Age of Onset     Melanoma No family hx of      Skin Cancer No family hx of        Social History   Substance Use Topics     Smoking status: Former Smoker     Smokeless tobacco: Not on file     Alcohol use Not on file       MEDICATIONS:  No current facility-administered medications for this encounter.      Current Outpatient Prescriptions   Medication Sig     Dupilumab 300 MG/2ML SOSY Inject 1 Syringe Subcutaneous every 14 days     DUPIXENT 300 MG/2ML SOSY INJECT THE CONTENTS OF  MG SYRINGES UNDER THE SKIN ONCE FOR LOADING DOSE (INJECT AT 2 DIFFERENT SITES)     methocarbamol (ROBAXIN) 500 MG tablet Take 500 mg by mouth     naproxen (NAPROSYN) 500 MG  tablet Take 500 mg by mouth     augmented betamethasone dipropionate (DIPROLENE-AF) 0.05 % ointment Apply to affected areas of hand twice daily, everyday. Use Vaniply on top of this medication. Occlude with gloves at night.     GENGRAF 100 MG PO CAPSULE Take 1 capsule (100 mg) by mouth 2 times daily     tiZANidine (ZANAFLEX) 2 MG tablet Take 2 mg by mouth Reported on 3/20/2017     minocycline (DYNACIN) 100 MG tablet Take 1 tablet (100 mg) by mouth 2 times daily     clobetasol (TEMOVATE) 0.05 % ointment Apply thin layer twice daily to hands and feet.  Soak hands before application at bedtime.     FLUOXETINE HCL PO      BuPROPion HCl (WELLBUTRIN PO)        ALLERGIES:     Allergies   Allergen Reactions     Bacitracin Rash     Patch testing     Glutaraldehyde Rash     2+ patch testing     Gold Rash     Mild patch test reaction     Penicillins Rash     Sulfa Drugs Rash       PERTINENT SYSTEMS REVIEW:    1. No - Do you have a history of heart attack, stroke, stent, bypass or surgery on an artery in the head, neck, heart or legs?  2. No - Do you ever have any pain or discomfort in your chest?  3. No - Do you have a history of  Heart Failure?  4. No - Are you troubled by shortness of breath when walking: On the level, up a slight hill or at night?  5. No - Do you currently have a cold, bronchitis or other respiratory infection?  6. No - Do you have a cough, shortness of breath or wheezing?  7. No - Do you sometimes get pains in the calves of your legs when you walk?  8. No - Do you or anyone in your family have previous history of blood clots?  9. No - Do you or does anyone in your family have a serious bleeding problem such as prolonged bleeding following surgeries or cuts?  10. No - Have you ever had problems with anemia or been told to take iron pills?  11. No - Have you had any abnormal blood loss such as black, tarry or bloody stools, or abnormal vaginal bleeding?  12. Yes: after childbirth - Have you ever had a blood  transfusion?  13. No - Have you or any of your relatives ever had problems with anesthesia?  14. No - Do you have sleep apnea, excessive snoring or daytime drowsiness?  15. No - Do you have any prosthetic heart valves?  16. No - Do you have prosthetic joints?    EXAMINATION:  Vitals were reviewed                     Vison:  Va, right - 20/30; left - 20/50   BAT, right - 20/70, left - 20/100  HEENT:  Cataract, otherwise unremarkable.  LUNGS:  Clear  CV:  Regular rate and rhythm without murmur  ABD:  Soft and nontender  NEURO:  Alert and nonfocal    IMPRESSION:  Patient cleared for ophthalmic surgery.  Low risk with monitored, light sedation.  I have assessed the patient's DVT risk, and no additional orders necessary.    PLAN:  Procedure(s):  PHACOEMULSIFICATION WITH STANDARD INTRAOCULAR LENS IMPLANT, Left Eye      Dominguez Payne MD

## 2017-06-28 ENCOUNTER — ANESTHESIA EVENT (OUTPATIENT)
Dept: SURGERY | Facility: CLINIC | Age: 67
End: 2017-06-28
Payer: MEDICARE

## 2017-06-28 ASSESSMENT — LIFESTYLE VARIABLES: TOBACCO_USE: 1

## 2017-06-28 NOTE — ANESTHESIA PREPROCEDURE EVALUATION
Anesthesia Evaluation     . Pt has had prior anesthetic. Type: MAC and General           ROS/MED HX    ENT/Pulmonary:     (+)tobacco use, Past use , . .    Neurologic:  - neg neurologic ROS     Cardiovascular: Comment: Venous insufficiency - neg cardiovascular ROS       METS/Exercise Tolerance:     Hematologic:  - neg hematologic  ROS       Musculoskeletal:  - neg musculoskeletal ROS       GI/Hepatic:  - neg GI/hepatic ROS       Renal/Genitourinary:  - ROS Renal section negative       Endo:  - neg endo ROS       Psychiatric:     (+) psychiatric history other (comment) and depression (adjustment disorder )      Infectious Disease:  - neg infectious disease ROS       Malignancy:      - no malignancy   Other: Comment: Left cataract    - neg other ROS                 Physical Exam  Normal systems: cardiovascular, pulmonary and dental    Airway   Mallampati: II  TM distance: >3 FB  Neck ROM: full    Dental     Cardiovascular   Rhythm and rate: regular and normal      Pulmonary    breath sounds clear to auscultation                    Anesthesia Plan      History & Physical Review  History and physical reviewed and following examination; no interval change.    ASA Status:  2 .    NPO Status:  > 6 hours    Plan for MAC Reason for MAC:  Other - see comments  PONV prophylaxis:  Ondansetron (or other 5HT-3) and Dexamethasone or Solumedrol       Postoperative Care  Postoperative pain management:  IV analgesics and Oral pain medications.      Consents  Anesthetic plan, risks, benefits and alternatives discussed with:  Patient..                          .

## 2017-07-03 ENCOUNTER — ANESTHESIA (OUTPATIENT)
Dept: SURGERY | Facility: CLINIC | Age: 67
End: 2017-07-03
Payer: MEDICARE

## 2017-07-03 ENCOUNTER — SURGERY (OUTPATIENT)
Age: 67
End: 2017-07-03

## 2017-07-03 ENCOUNTER — HOSPITAL ENCOUNTER (OUTPATIENT)
Facility: CLINIC | Age: 67
Discharge: HOME | End: 2017-07-03
Attending: OPHTHALMOLOGY | Admitting: OPHTHALMOLOGY
Payer: MEDICARE

## 2017-07-03 VITALS
DIASTOLIC BLOOD PRESSURE: 59 MMHG | RESPIRATION RATE: 16 BRPM | OXYGEN SATURATION: 99 % | HEART RATE: 69 BPM | TEMPERATURE: 98.6 F | WEIGHT: 170 LBS | HEIGHT: 61 IN | SYSTOLIC BLOOD PRESSURE: 115 MMHG | BODY MASS INDEX: 32.1 KG/M2

## 2017-07-03 PROCEDURE — 25000128 H RX IP 250 OP 636: Performed by: NURSE ANESTHETIST, CERTIFIED REGISTERED

## 2017-07-03 PROCEDURE — 37000012 ZZH ANESTHESIA CATARACT PACKAGE: Performed by: OPHTHALMOLOGY

## 2017-07-03 PROCEDURE — 25000308 HC RX OP HPI UCR WEL MED 250 IP 250: Performed by: OPHTHALMOLOGY

## 2017-07-03 PROCEDURE — 36000025 ZZH CATARACT SURGICAL PACKAGE: Performed by: OPHTHALMOLOGY

## 2017-07-03 PROCEDURE — 25000125 ZZHC RX 250: Performed by: OPHTHALMOLOGY

## 2017-07-03 PROCEDURE — 71000022 ZZH RECOVERY CATRACT PACKAGE: Performed by: OPHTHALMOLOGY

## 2017-07-03 PROCEDURE — 25000128 H RX IP 250 OP 636: Performed by: OPHTHALMOLOGY

## 2017-07-03 PROCEDURE — V2632 POST CHMBR INTRAOCULAR LENS: HCPCS | Performed by: OPHTHALMOLOGY

## 2017-07-03 DEVICE — EYE IMP IOL AMO PCL TECNIS ZCB00 18.0: Type: IMPLANTABLE DEVICE | Site: EYE | Status: FUNCTIONAL

## 2017-07-03 RX ORDER — PHENYLEPHRINE HYDROCHLORIDE 25 MG/ML
1 SOLUTION/ DROPS OPHTHALMIC
Status: COMPLETED | OUTPATIENT
Start: 2017-07-03 | End: 2017-07-03

## 2017-07-03 RX ORDER — SODIUM CHLORIDE, SODIUM LACTATE, POTASSIUM CHLORIDE, CALCIUM CHLORIDE 600; 310; 30; 20 MG/100ML; MG/100ML; MG/100ML; MG/100ML
INJECTION, SOLUTION INTRAVENOUS CONTINUOUS
Status: DISCONTINUED | OUTPATIENT
Start: 2017-07-03 | End: 2017-07-03 | Stop reason: HOSPADM

## 2017-07-03 RX ORDER — PROPARACAINE HYDROCHLORIDE 5 MG/ML
SOLUTION/ DROPS OPHTHALMIC PRN
Status: DISCONTINUED | OUTPATIENT
Start: 2017-07-03 | End: 2017-07-03 | Stop reason: HOSPADM

## 2017-07-03 RX ORDER — CYCLOPENTOLATE HYDROCHLORIDE 10 MG/ML
1 SOLUTION/ DROPS OPHTHALMIC
Status: COMPLETED | OUTPATIENT
Start: 2017-07-03 | End: 2017-07-03

## 2017-07-03 RX ORDER — TROPICAMIDE 10 MG/ML
1 SOLUTION/ DROPS OPHTHALMIC
Status: COMPLETED | OUTPATIENT
Start: 2017-07-03 | End: 2017-07-03

## 2017-07-03 RX ORDER — BALANCED SALT SOLUTION 6.4; .75; .48; .3; 3.9; 1.7 MG/ML; MG/ML; MG/ML; MG/ML; MG/ML; MG/ML
SOLUTION OPHTHALMIC PRN
Status: DISCONTINUED | OUTPATIENT
Start: 2017-07-03 | End: 2017-07-03 | Stop reason: HOSPADM

## 2017-07-03 RX ADMIN — CYCLOPENTOLATE HYDROCHLORIDE 1 DROP: 10 SOLUTION/ DROPS OPHTHALMIC at 07:34

## 2017-07-03 RX ADMIN — PHENYLEPHRINE HYDROCHLORIDE 1 DROP: 25 SOLUTION/ DROPS OPHTHALMIC at 07:42

## 2017-07-03 RX ADMIN — CYCLOPENTOLATE HYDROCHLORIDE 1 DROP: 10 SOLUTION/ DROPS OPHTHALMIC at 07:41

## 2017-07-03 RX ADMIN — MIDAZOLAM HYDROCHLORIDE 2 MG: 1 INJECTION, SOLUTION INTRAMUSCULAR; INTRAVENOUS at 08:25

## 2017-07-03 RX ADMIN — PHENYLEPHRINE HYDROCHLORIDE 1 DROP: 25 SOLUTION/ DROPS OPHTHALMIC at 07:26

## 2017-07-03 RX ADMIN — TROPICAMIDE 1 DROP: 10 SOLUTION/ DROPS OPHTHALMIC at 07:33

## 2017-07-03 RX ADMIN — EPINEPHRINE 0.6 ML: 1 INJECTION, SOLUTION, CONCENTRATE INTRAVENOUS at 08:32

## 2017-07-03 RX ADMIN — LIDOCAINE HYDROCHLORIDE 0.5 TUBE: 20 JELLY TOPICAL at 08:32

## 2017-07-03 RX ADMIN — PROPARACAINE HYDROCHLORIDE 2 DROP: 5 SOLUTION/ DROPS OPHTHALMIC at 08:32

## 2017-07-03 RX ADMIN — TROPICAMIDE 1 DROP: 10 SOLUTION/ DROPS OPHTHALMIC at 07:41

## 2017-07-03 RX ADMIN — BALANCED SALT SOLUTION 125 ML: 6.4; .75; .48; .3; 3.9; 1.7 SOLUTION OPHTHALMIC at 08:32

## 2017-07-03 RX ADMIN — TROPICAMIDE 1 DROP: 10 SOLUTION/ DROPS OPHTHALMIC at 07:24

## 2017-07-03 RX ADMIN — SODIUM CHLORIDE, POTASSIUM CHLORIDE, SODIUM LACTATE AND CALCIUM CHLORIDE: 600; 310; 30; 20 INJECTION, SOLUTION INTRAVENOUS at 07:36

## 2017-07-03 RX ADMIN — CYCLOPENTOLATE HYDROCHLORIDE 1 DROP: 10 SOLUTION/ DROPS OPHTHALMIC at 07:25

## 2017-07-03 RX ADMIN — PHENYLEPHRINE HYDROCHLORIDE 1 DROP: 25 SOLUTION/ DROPS OPHTHALMIC at 07:35

## 2017-07-03 NOTE — OP NOTE
CATARACT OPERATIVE NOTE    PATIENT: Kasey Ibrahim  DATE OF SURGERY: 7/3/2017  PREOPERATIVE DIAGNOSIS:  Senile Nuclear Cataract, Left eye  POSTOPERATIVE DIAGNOSIS:  Senile Nuclear Cataract, Left eye  OPERATIVE PROCEDURE:  Phacoemulsification and placement of intraocular lens  SURGEON:  Dominguez Payne MD  ANESTHESIA:  Topical / MAC  EBL:  None  SPECIMENS:  None  COMPLICATIONS:  None    PROCEDURE:  The patient was brought to the operating room at [facility].  The left eye was prepped and draped in the usual fashion for cataract surgery.  A wire lid speculum was inserted.  A super sharp blade was used to make a paracentesis at the 5 O'clock position.  The super sharp blade was used to make a partial thickness temporal groove, which was 3 mm in length.  0.8 mL of non-preserved epi-Shugarcaine was injected into the anterior chamber.  Viscoelastic was used to inflate the anterior chamber through a cannula.  A 2.5 mm microkeratome was used to make a temporal clear corneal incision in a two-plane fashion.  A cystotome needle and forceps were used to make a capsulorrhexis.  Hydrodissection and hydrodelineation were performed with Balance Salt Solution.  The lens was then phacoemulsified and removed without complications.  The cortical material was removed with bimanual irrigation and aspiration.  The capsular bag was filled with viscoelastic.  A posterior chamber intraocular lens, preselected and recorded, was folded and inserted into the capsular bag.  The viscoelastic was removed with the irrigation and aspiration tip.  Balanced Salt Solution was used to refill the anterior chamber.  The wounds were checked for water tightness and required no suture.  The wire lid speculum was removed.  The patient's left eye was cleaned and a drop of each post-operative drop was placed, followed by a george shield.  The patient tolerated the procedure well, and there were no complications.      Dominguez PAEZ  MD [place]

## 2017-07-03 NOTE — OR NURSING
Doing well postop. Iv patent. Awake and alert. Vss. Will dc when ready. Sits up to eat and drink. No pain. Instructions by  and reinforced by rn. Instructions given to pt and . Will see dr at 1500 today. Doing well. Eye drop kit with pt postop

## 2017-07-03 NOTE — ANESTHESIA POSTPROCEDURE EVALUATION
Patient: Kasey Ibrahim    Procedure(s):  Left cataract removal with implant - Wound Class: I-Clean    Diagnosis:Left cataract  Diagnosis Additional Information: No value filed.    Anesthesia Type:  MAC    Note:  Anesthesia Post Evaluation    Patient location during evaluation: Bedside  Patient participation: Able to fully participate in evaluation  Level of consciousness: awake and alert  Pain management: adequate  Airway patency: patent  Cardiovascular status: acceptable  Respiratory status: acceptable  Hydration status: acceptable  PONV: none     Anesthetic complications: None          Last vitals:  Vitals:    07/03/17 0708   BP: 137/86   Pulse: 77   Resp: 18   Temp: 36.8  C (98.3  F)   SpO2: 96%         Electronically Signed By: BANDAR Orozco CRNA  July 3, 2017  8:48 AM

## 2017-07-21 ENCOUNTER — ANESTHESIA EVENT (OUTPATIENT)
Dept: SURGERY | Facility: CLINIC | Age: 67
End: 2017-07-21
Payer: MEDICARE

## 2017-07-24 ENCOUNTER — ANESTHESIA (OUTPATIENT)
Dept: SURGERY | Facility: CLINIC | Age: 67
End: 2017-07-24
Payer: MEDICARE

## 2017-07-24 ENCOUNTER — SURGERY (OUTPATIENT)
Age: 67
End: 2017-07-24

## 2017-07-24 ENCOUNTER — HOSPITAL ENCOUNTER (OUTPATIENT)
Facility: CLINIC | Age: 67
Discharge: HOME | End: 2017-07-24
Attending: OPHTHALMOLOGY | Admitting: OPHTHALMOLOGY
Payer: MEDICARE

## 2017-07-24 VITALS
DIASTOLIC BLOOD PRESSURE: 63 MMHG | RESPIRATION RATE: 16 BRPM | OXYGEN SATURATION: 100 % | TEMPERATURE: 98 F | HEART RATE: 73 BPM | WEIGHT: 174 LBS | BODY MASS INDEX: 32.85 KG/M2 | HEIGHT: 61 IN | SYSTOLIC BLOOD PRESSURE: 114 MMHG

## 2017-07-24 PROCEDURE — 37000012 ZZH ANESTHESIA CATARACT PACKAGE: Performed by: OPHTHALMOLOGY

## 2017-07-24 PROCEDURE — 36000025 ZZH CATARACT SURGICAL PACKAGE: Performed by: OPHTHALMOLOGY

## 2017-07-24 PROCEDURE — 71000022 ZZH RECOVERY CATRACT PACKAGE: Performed by: OPHTHALMOLOGY

## 2017-07-24 PROCEDURE — V2632 POST CHMBR INTRAOCULAR LENS: HCPCS | Performed by: OPHTHALMOLOGY

## 2017-07-24 PROCEDURE — 25000128 H RX IP 250 OP 636: Performed by: NURSE ANESTHETIST, CERTIFIED REGISTERED

## 2017-07-24 PROCEDURE — 25000308 HC RX OP HPI UCR WEL MED 250 IP 250: Performed by: OPHTHALMOLOGY

## 2017-07-24 PROCEDURE — 25000128 H RX IP 250 OP 636: Performed by: OPHTHALMOLOGY

## 2017-07-24 PROCEDURE — 25000125 ZZHC RX 250: Performed by: OPHTHALMOLOGY

## 2017-07-24 PROCEDURE — 25000125 ZZHC RX 250: Performed by: NURSE ANESTHETIST, CERTIFIED REGISTERED

## 2017-07-24 DEVICE — IMPLANTABLE DEVICE: Type: IMPLANTABLE DEVICE | Site: EYE | Status: FUNCTIONAL

## 2017-07-24 RX ORDER — SODIUM CHLORIDE, SODIUM LACTATE, POTASSIUM CHLORIDE, CALCIUM CHLORIDE 600; 310; 30; 20 MG/100ML; MG/100ML; MG/100ML; MG/100ML
INJECTION, SOLUTION INTRAVENOUS CONTINUOUS
Status: DISCONTINUED | OUTPATIENT
Start: 2017-07-24 | End: 2017-07-24 | Stop reason: HOSPADM

## 2017-07-24 RX ORDER — TROPICAMIDE 10 MG/ML
1 SOLUTION/ DROPS OPHTHALMIC
Status: COMPLETED | OUTPATIENT
Start: 2017-07-24 | End: 2017-07-24

## 2017-07-24 RX ORDER — NALOXONE HYDROCHLORIDE 0.4 MG/ML
.1-.4 INJECTION, SOLUTION INTRAMUSCULAR; INTRAVENOUS; SUBCUTANEOUS
Status: DISCONTINUED | OUTPATIENT
Start: 2017-07-24 | End: 2017-07-24 | Stop reason: HOSPADM

## 2017-07-24 RX ORDER — MEPERIDINE HYDROCHLORIDE 25 MG/ML
12.5 INJECTION INTRAMUSCULAR; INTRAVENOUS; SUBCUTANEOUS
Status: DISCONTINUED | OUTPATIENT
Start: 2017-07-24 | End: 2017-07-24 | Stop reason: HOSPADM

## 2017-07-24 RX ORDER — PROPARACAINE HYDROCHLORIDE 5 MG/ML
SOLUTION/ DROPS OPHTHALMIC PRN
Status: DISCONTINUED | OUTPATIENT
Start: 2017-07-24 | End: 2017-07-24 | Stop reason: HOSPADM

## 2017-07-24 RX ORDER — BALANCED SALT SOLUTION 6.4; .75; .48; .3; 3.9; 1.7 MG/ML; MG/ML; MG/ML; MG/ML; MG/ML; MG/ML
SOLUTION OPHTHALMIC PRN
Status: DISCONTINUED | OUTPATIENT
Start: 2017-07-24 | End: 2017-07-24 | Stop reason: HOSPADM

## 2017-07-24 RX ORDER — ONDANSETRON 2 MG/ML
4 INJECTION INTRAMUSCULAR; INTRAVENOUS EVERY 30 MIN PRN
Status: DISCONTINUED | OUTPATIENT
Start: 2017-07-24 | End: 2017-07-24 | Stop reason: HOSPADM

## 2017-07-24 RX ORDER — PHENYLEPHRINE HYDROCHLORIDE 25 MG/ML
1 SOLUTION/ DROPS OPHTHALMIC
Status: COMPLETED | OUTPATIENT
Start: 2017-07-24 | End: 2017-07-24

## 2017-07-24 RX ORDER — ONDANSETRON 4 MG/1
4 TABLET, ORALLY DISINTEGRATING ORAL EVERY 30 MIN PRN
Status: DISCONTINUED | OUTPATIENT
Start: 2017-07-24 | End: 2017-07-24 | Stop reason: HOSPADM

## 2017-07-24 RX ORDER — CYCLOPENTOLATE HYDROCHLORIDE 10 MG/ML
1 SOLUTION/ DROPS OPHTHALMIC
Status: COMPLETED | OUTPATIENT
Start: 2017-07-24 | End: 2017-07-24

## 2017-07-24 RX ORDER — LIDOCAINE 40 MG/G
CREAM TOPICAL
Status: DISCONTINUED | OUTPATIENT
Start: 2017-07-24 | End: 2017-07-24 | Stop reason: HOSPADM

## 2017-07-24 RX ADMIN — SODIUM CHLORIDE, POTASSIUM CHLORIDE, SODIUM LACTATE AND CALCIUM CHLORIDE: 600; 310; 30; 20 INJECTION, SOLUTION INTRAVENOUS at 09:12

## 2017-07-24 RX ADMIN — MIDAZOLAM HYDROCHLORIDE 0.5 MG: 1 INJECTION, SOLUTION INTRAMUSCULAR; INTRAVENOUS at 10:14

## 2017-07-24 RX ADMIN — PHENYLEPHRINE HYDROCHLORIDE 1 DROP: 25 SOLUTION/ DROPS OPHTHALMIC at 09:21

## 2017-07-24 RX ADMIN — BALANCED SALT SOLUTION 200 ML: 6.4; .75; .48; .3; 3.9; 1.7 SOLUTION OPHTHALMIC at 10:15

## 2017-07-24 RX ADMIN — EPINEPHRINE 0.5 ML: 1 INJECTION, SOLUTION, CONCENTRATE INTRAVENOUS at 10:14

## 2017-07-24 RX ADMIN — LIDOCAINE HYDROCHLORIDE 1 TUBE: 20 JELLY TOPICAL at 10:15

## 2017-07-24 RX ADMIN — LIDOCAINE HYDROCHLORIDE 1 ML: 10 INJECTION, SOLUTION EPIDURAL; INFILTRATION; INTRACAUDAL; PERINEURAL at 09:11

## 2017-07-24 RX ADMIN — TROPICAMIDE 1 DROP: 10 SOLUTION/ DROPS OPHTHALMIC at 09:10

## 2017-07-24 RX ADMIN — MIDAZOLAM HYDROCHLORIDE 1 MG: 1 INJECTION, SOLUTION INTRAMUSCULAR; INTRAVENOUS at 10:06

## 2017-07-24 RX ADMIN — MIDAZOLAM HYDROCHLORIDE 0.5 MG: 1 INJECTION, SOLUTION INTRAMUSCULAR; INTRAVENOUS at 10:08

## 2017-07-24 RX ADMIN — CYCLOPENTOLATE HYDROCHLORIDE 1 DROP: 10 SOLUTION/ DROPS OPHTHALMIC at 09:20

## 2017-07-24 RX ADMIN — TROPICAMIDE 1 DROP: 10 SOLUTION/ DROPS OPHTHALMIC at 09:20

## 2017-07-24 RX ADMIN — CYCLOPENTOLATE HYDROCHLORIDE 1 DROP: 10 SOLUTION/ DROPS OPHTHALMIC at 09:34

## 2017-07-24 RX ADMIN — CYCLOPENTOLATE HYDROCHLORIDE 1 DROP: 10 SOLUTION/ DROPS OPHTHALMIC at 09:10

## 2017-07-24 RX ADMIN — PHENYLEPHRINE HYDROCHLORIDE 1 DROP: 25 SOLUTION/ DROPS OPHTHALMIC at 09:11

## 2017-07-24 RX ADMIN — PHENYLEPHRINE HYDROCHLORIDE 1 DROP: 25 SOLUTION/ DROPS OPHTHALMIC at 09:34

## 2017-07-24 RX ADMIN — TROPICAMIDE 1 DROP: 10 SOLUTION/ DROPS OPHTHALMIC at 09:34

## 2017-07-24 RX ADMIN — PROPARACAINE HYDROCHLORIDE 2 DROP: 5 SOLUTION/ DROPS OPHTHALMIC at 10:15

## 2017-07-24 ASSESSMENT — LIFESTYLE VARIABLES: TOBACCO_USE: 1

## 2017-07-24 NOTE — ANESTHESIA POSTPROCEDURE EVALUATION
Patient: Kasey Ibrahim    Procedure(s):  Right cataract removal with implant - Wound Class: I-Clean    Diagnosis:Right cataract  Diagnosis Additional Information: No value filed.    Anesthesia Type:  MAC    Note:  Anesthesia Post Evaluation    Patient location during evaluation: Phase 2  Patient participation: Able to fully participate in evaluation  Level of consciousness: awake and alert  Pain management: adequate  Airway patency: patent  Cardiovascular status: acceptable  Respiratory status: acceptable  Hydration status: stable  PONV: none             Last vitals:  Vitals:    07/24/17 0851 07/24/17 1027   BP: 139/90 111/64   Pulse: 73    Resp: 16 16   Temp: 36.8  C (98.3  F) 36.7  C (98  F)   SpO2: 96% 98%         Electronically Signed By: BANDAR Goldsmith CRNA  July 24, 2017  10:33 AM

## 2017-07-24 NOTE — ANESTHESIA CARE TRANSFER NOTE
Patient: Kasey Ibrahim    Procedure(s):  Right cataract removal with implant - Wound Class: I-Clean    Diagnosis: Right cataract  Diagnosis Additional Information: No value filed.    Anesthesia Type:   MAC     Note:  Airway :Room Air  Patient transferred to:Phase II        Vitals: (Last set prior to Anesthesia Care Transfer)    CRNA VITALS  7/24/2017 0955 - 7/24/2017 1032      7/24/2017             SpO2: 97 %                Electronically Signed By: BANDAR Goldsmith CRNA  July 24, 2017  10:32 AM

## 2017-07-31 ENCOUNTER — TELEPHONE (OUTPATIENT)
Dept: DERMATOLOGY | Facility: CLINIC | Age: 67
End: 2017-07-31

## 2017-07-31 DIAGNOSIS — L30.9 CHRONIC DERMATITIS OF HANDS: ICD-10-CM

## 2017-07-31 DIAGNOSIS — L23.9 ALLERGIC CONTACT DERMATITIS, UNSPECIFIED TRIGGER: ICD-10-CM

## 2017-07-31 RX ORDER — PREDNISONE 10 MG/1
TABLET ORAL
Qty: 80 TABLET | Refills: 0 | Status: SHIPPED | OUTPATIENT
Start: 2017-07-31 | End: 2017-07-31

## 2017-07-31 RX ORDER — PREDNISONE 10 MG/1
TABLET ORAL
Qty: 80 TABLET | Refills: 0 | Status: SHIPPED | OUTPATIENT
Start: 2017-07-31

## 2017-07-31 NOTE — TELEPHONE ENCOUNTER
Had cataracts removed bilaterally over the last 3 weeks. She did not want to get started on prednisone while undergoing the procedures, but now is requesting systemic therapy.  Topically she is using betamethasone twice daily which does not help. Also uses vanicream and vaniply. She tried BBs a long time ago but it made it worse. Her skin is so broken down she cant tie her own shoes. Any trauma causes her fingers to bleed. She is miserable. Discussed with Dr. Arora. Given the severity of her dermatitis will treat with a prednisone burst. Prednisone: Take 40mg [id number] then [id number],, then 20mg qday until seen. Sent to her pharmacy. Given her recent surgery, I recommended she discuss this with her ophthalmologist to make sure they are OK with this plan.    She will followup with Dr. Zuniga as scheduled 8/1.     Charlene Tanner MD  Medicine/Dermatology, PGY-5  [facility]

## 2017-07-31 NOTE — TELEPHONE ENCOUNTER
"Patient does not use dupixent, insurance would not cover. Has not used any medication for hands which are \"really bad\" Describes cracking and bleeding of the hands. She was told to f/u after eye surgery with any questions or concerns. She was requesting prednisone before her eye surgery but it was denied and she was hoping to be able to start it now since she has had the surgery. Patient has appt 8/21 but feels she cannot wait that long. She wanted to give an update of the medications she takes so there wasn't an issue.    Ketorolac ggts, prednisolone gtts     She would like prescription sent to [facility] pharmacy.    Routing to [facility] for consideration  "

## 2017-08-21 ENCOUNTER — OFFICE VISIT (OUTPATIENT)
Dept: DERMATOLOGY | Facility: CLINIC | Age: 67
End: 2017-08-21

## 2017-08-21 DIAGNOSIS — L23.9 ALLERGIC CONTACT DERMATITIS, UNSPECIFIED TRIGGER: Primary | ICD-10-CM

## 2017-08-21 RX ORDER — METHOCARBAMOL 500 MG/1
500 TABLET, FILM COATED ORAL
COMMUNITY

## 2017-08-21 RX ORDER — PREDNISONE 5 MG/1
5 TABLET ORAL DAILY
Qty: 30 TABLET | Refills: 3 | Status: SHIPPED | OUTPATIENT
Start: 2017-08-21 | End: 2017-10-02

## 2017-08-21 ASSESSMENT — PAIN SCALES - GENERAL: PAINLEVEL: NO PAIN (0)

## 2017-08-21 NOTE — LETTER
8/21/2017       RE: Kasey Ibrahim  [address] [facility] [address]     Dear Colleague,    Thank you for referring your patient, Kasey Ibrahim, to the [facility] DERMATOLOGY at [facility]. Please see a copy of my visit note below.    [facility] Dermatology Note      Dermatology Problem List:    1. Suspected ACD hands and feet:  BX 4/2014 showed spongiotic dermatitis with eos on hands:  DDx ACD vs atopic dermatitis vs numular dermatitis   -TREATMENT FAILURES:  nb uvb, MMF, acitretin, methotrexate (all prior to presenting at the ), minocycline ( 8/2016-3/20/17), prednisone, clobetasol 0.05% ointment, CSA 3mg/kg/day  -Previous patch testing 8/2013 at [facility] [place]:  2+ glutaraldehyde; mild: gold and Lillian soft moisturizing cream. Doubtful: fragrance mix 2, dodecyl gallate (both of these attributed to irritant reaction and not considered true allergens). Previous positives to bacitracin, bronopol, and mercapto mix were not reproducible; Dr. Quinonez recommended avoidance of bacitracin. Avoid leather.  -Prior treatments  -Cannot use dapsone due to sulfa allergy, cannot due [employer] due to work schedule  -   Unable to continue dupixent due to cost       CC:   Chief Complaint   Patient presents with     Contact Dermatitis Follow Up     Kasey is here today for a dermatitis follow up.          Encounter Date: Aug 21, 2017    History of Present Illness:  Ms. Kasey Ibrahim is a 67 year old female who presents as a follow-up for dermatitis. The patient was last seen April 2017 when we prescribed Dupixent.  She started it but could not continue the medication due to cost.  She had eye surgery but then called in for a prednisone taper since her hands were flaring so badly.  She has tapered down to prednisone 20 mg daily and feels better.  She continue to work at [employer].     Past Medical History:   Patient Active Problem List   Diagnosis     Cholangitis     Past Medical  History:   Diagnosis Date     Depression      Hand dermatitis      Past Surgical History:   Procedure Laterality Date     FOOT SURGERY       PHACOEMULSIFICATION WITH STANDARD INTRAOCULAR LENS IMPLANT Left 7/3/2017    Procedure: PHACOEMULSIFICATION WITH STANDARD INTRAOCULAR LENS IMPLANT;  Left cataract removal with implant;  Surgeon: Dominguez Payne MD;  Location: [facility] OR     PHACOEMULSIFICATION WITH STANDARD INTRAOCULAR LENS IMPLANT Right 7/24/2017    Procedure: PHACOEMULSIFICATION WITH STANDARD INTRAOCULAR LENS IMPLANT;  Right cataract removal with implant;  Surgeon: Dominguez Payne MD;  Location: [facility] OR       Social History:  The patient works.    Family History:      Medications:  Current Outpatient Prescriptions   Medication Sig Dispense Refill     predniSONE (DELTASONE) 5 MG tablet Take 1 tablet (5 mg) by mouth daily 30 tablet 3     methocarbamol (ROBAXIN) 500 MG tablet Take 500 mg by mouth       predniSONE (DELTASONE) 10 MG tablet Take 40mg [id number] then [id number],, then 20mg qday until seen. 80 tablet 0     naproxen (NAPROSYN) 500 MG tablet Take 500 mg by mouth       augmented betamethasone dipropionate (DIPROLENE-AF) 0.05 % ointment Apply to affected areas of hand twice daily, everyday. Use Vaniply on top of this medication. Occlude with gloves at night. 50 g 3     clobetasol (TEMOVATE) 0.05 % ointment Apply thin layer twice daily to hands and feet.  Soak hands before application at bedtime. 60 g 11     FLUOXETINE HCL PO        BuPROPion HCl (WELLBUTRIN PO)        Allergies   Allergen Reactions     Sulfadiazine      Other reaction(s): Hives/ Urticaria - Allergy     Tramadol      Other reaction(s): Shortness Of Breath/Wheezing/Chest Tightness - Allergy     Atorvastatin      Other reaction(s): Rash - Allergy     Bronopol Other (See Comments)     Previous Positive (+) skin patch test     Bacitracin Rash     Patch testing     Glutaraldehyde Rash     2+ patch testing     Gold Rash     Mild  patch test reaction     Insulin Detemir      Other reaction(s): Headaches/ Migraines - Intolerance     Lisinopril      Other reaction(s): Headaches/ Migraines - Intolerance     Penicillins Rash     Sulfa Drugs Rash         Review of Systems:  -As per HPI  .  -Skin: As above in HPI. No additional skin concerns.    Physical exam:  Vitals: There were no vitals taken for this visit.  GEN: This is a well developed, well-nourished female in no acute distress, in a pleasant mood.    SKIN: Focused examination of the hands was performed.  -There are minimal pink scaly patches and plaques on the hands    -No other lesions of concern on areas examined.     Impression/Plan:  1. Eczematous dermatitis/ACD    We have exhausted the steroid sparing agents that the patient can do with her work schedule.  We may need to consider low dose prednisone for a time.  I discussed the side effects of this plan with the patient.  Taper prednisone down to 10 mg daily then 5 mg daily and hold.  We will try to find the least amount of prednisone to allow the patient to continue her activities of daily living        Follow-up in 6 weeks, earlier for new or changing lesions.       Staff Involved:  Staff Only      Randee Zuniga MD

## 2017-08-21 NOTE — MR AVS SNAPSHOT
After Visit Summary   8/21/2017    Kasey Ibrahim    MRN: [id number]           Patient Information     Date Of Birth          1950        Visit Information        Provider Department      8/21/2017 10:15 AM Randee Zuniga MD [facility]        Today's Diagnoses     Allergic contact dermatitis, unspecified trigger    -  1       Follow-ups after your visit        Your next 10 appointments already scheduled     Oct 02, 2017  9:15 AM CDT   (Arrive by 9:00 AM)   Return Visit with MD CLARICE Robbins [facility] ([facility])    [address] [place] [id number] [phone]              Who to contact     Please call your clinic at [phone] to:    Ask questions about your health    Make or cancel appointments    Discuss your medicines    Learn about your test results    Speak to your doctor   If you have compliments or concerns about an experience at your clinic, or if you wish to file a complaint, please contact [facility] Patient Relations at [phone] or email us at [email or web address]         Additional Information About Your Visit        MyChart Information     [employer] gives you secure access to your electronic health record. If you see a primary care provider, you can also send messages to your care team and make appointments. If you have questions, please call your primary care clinic.  If you do not have a primary care provider, please call [phone] and they will assist you.      [employer] is an electronic gateway that provides easy, online access to your medical records. With [employer], you can request a clinic appointment, read your test results, renew a prescription or communicate with your care team.     To access your existing account, please contact your [facility] Clinic or call [phone] for assistance.        Care EveryWhere ID      This is your Care EveryWhere ID. This could be used by other organizations to access your [facility] medical records  [id number]         Blood Pressure from Last 3 Encounters:   07/24/17 114/63   07/03/17 115/59   04/17/17 131/88    Weight from Last 3 Encounters:   07/24/17 78.9 kg (174 lb)   07/03/17 77.1 kg (170 lb)   03/20/17 80.6 kg (177 lb 12.8 oz)              Today, you had the following     No orders found for display         Today's Medication Changes          These changes are accurate as of: 8/21/17 10:45 AM.  If you have any questions, ask your nurse or doctor.               These medicines have changed or have updated prescriptions.        Dose/Directions    * predniSONE 10 MG tablet   Commonly known as:  DELTASONE   This may have changed:  Another medication with the same name was added. Make sure you understand how and when to take each.   Used for:  Chronic dermatitis of hands, Allergic contact dermatitis, unspecified trigger   Changed by:  Randee Zuniga MD        Take 40mg socvt2zall then [id number],, then 20mg qday until seen.   Quantity:  80 tablet   Refills:  0       * predniSONE 5 MG tablet   Commonly known as:  DELTASONE   This may have changed:  You were already taking a medication with the same name, and this prescription was added. Make sure you understand how and when to take each.   Used for:  Allergic contact dermatitis, unspecified trigger   Changed by:  Randee Zuniga MD        Dose:  5 mg   Take 1 tablet (5 mg) by mouth daily   Quantity:  30 tablet   Refills:  3       * Notice:  This list has 2 medication(s) that are the same as other medications prescribed for you. Read the directions carefully, and ask your doctor or other care provider to review them with you.         Where to get your medicines      These medications were sent to [facility] [address] [place] [address] - [address]   [address] [facility] [address]     Phone:  [phone]      predniSONE 5 MG tablet                Primary Care Provider Office Phone # Fax #    Ankit Dominguez Martini [phone]       [facility] [address] [facility] [address]        Equal Access to Services     LISA BALLARD : Hadii aad ku haddonnell Sokasandra, waaxda luqadaha, qaybta kaalmada adefernandoyada, rut perdomo vanda figueroa. So [facility] [phone].    ATENCIÓN: Si habla español, tiene a young disposición servicios gratuitos de asistencia lingüística. [facility] [phone].    We comply with applicable federal civil rights laws and [place] laws. We do not discriminate on the basis of race, color, national origin, age, disability sex, sexual orientation or gender identity.            Thank you!     Thank you for choosing [facility]  for your care. Our goal is always to provide you with excellent care. Hearing back from our patients is one way we can continue to improve our services. Please take a few minutes to complete the written survey that you may receive in the mail after your visit with us. Thank you!             Your Updated Medication List - Protect others around you: Learn how to safely use, store and throw away your medicines at www.disposemymeds.org.          This list is accurate as of: 8/21/17 10:45 AM.  Always use your most recent med list.                   Brand Name Dispense Instructions for use Diagnosis    augmented betamethasone dipropionate 0.05 % ointment    DIPROLENE-AF    50 g    Apply to affected areas of hand twice daily, everyday. Use Vaniply on top of this medication. Occlude with gloves at night.    Allergic contact dermatitis due to drugs in contact with skin       clobetasol 0.05 % ointment    TEMOVATE    60 g    Apply thin layer twice daily to hands and feet.  Soak hands before application at bedtime.    Allergic contact dermatitis, unspecified trigger, Chronic dermatitis of hands       FLUOXETINE HCL PO           methocarbamol 500 MG  tablet    ROBAXIN     Take 500 mg by mouth        naproxen 500 MG tablet    NAPROSYN     Take 500 mg by mouth        * predniSONE 10 MG tablet    DELTASONE    80 tablet    Take 40mg [id number] then [id number],, then 20mg qday until seen.    Chronic dermatitis of hands, Allergic contact dermatitis, unspecified trigger       * predniSONE 5 MG tablet    DELTASONE    30 tablet    Take 1 tablet (5 mg) by mouth daily    Allergic contact dermatitis, unspecified trigger       WELLBUTRIN PO           * Notice:  This list has 2 medication(s) that are the same as other medications prescribed for you. Read the directions carefully, and ask your doctor or other care provider to review them with you.

## 2017-08-21 NOTE — PROGRESS NOTES
[facility] Note      Dermatology Problem List:    1. Suspected ACD hands and feet:  BX 4/2014 showed spongiotic dermatitis with eos on hands:  DDx ACD vs atopic dermatitis vs numular dermatitis   -TREATMENT FAILURES:  nb uvb, MMF, acitretin, methotrexate (all prior to presenting at the ), minocycline ( 8/2016-3/20/17), prednisone, clobetasol 0.05% ointment, CSA 3mg/kg/day  -Previous patch testing 8/[place] [facility] [place]:  2+ glutaraldehyde; mild: gold and Lillian soft moisturizing cream. Doubtful: fragrance mix 2, dodecyl gallate (both of these attributed to irritant reaction and not considered true allergens). Previous positives to bacitracin, bronopol, and mercapto mix were not reproducible; Dr. Quinonez recommended avoidance of bacitracin. Avoid leather.  -Prior treatments  -Cannot use dapsone due to sulfa allergy, cannot due UVA1 due to work schedule  -  Unable to continue dupixent due to cost       CC:   Chief Complaint   Patient presents with     Contact Dermatitis Follow Up     Kasey is here today for a dermatitis follow up.          Encounter Date: Aug 21, 2017    History of Present Illness:  Ms. Kasey Ibrahim is a 67 year old female who presents as a follow-up for dermatitis. The patient was last seen April 2017 when we prescribed Dupixent.  She started it but could not continue the medication due to cost.  She had eye surgery but then called in for a prednisone taper since her hands were flaring so badly.  She has tapered down to prednisone 20 mg daily and feels better.  She continue to work at [employer].     Past Medical History:   Patient Active Problem List   Diagnosis     Cholangitis     Past Medical History:   Diagnosis Date     Depression      Hand dermatitis      Past Surgical History:   Procedure Laterality Date     FOOT SURGERY       PHACOEMULSIFICATION WITH STANDARD INTRAOCULAR LENS IMPLANT Left 7/3/2017    Procedure: PHACOEMULSIFICATION WITH STANDARD INTRAOCULAR LENS  IMPLANT;  Left cataract removal with implant;  Surgeon: Dominguez Payne MD;  Location: [facility] OR     PHACOEMULSIFICATION WITH STANDARD INTRAOCULAR LENS IMPLANT Right 7/24/2017    Procedure: PHACOEMULSIFICATION WITH STANDARD INTRAOCULAR LENS IMPLANT;  Right cataract removal with implant;  Surgeon: Dominguez Payne MD;  Location: [facility] OR       Social History:  The patient works.    Family History:      Medications:  Current Outpatient Prescriptions   Medication Sig Dispense Refill     predniSONE (DELTASONE) 5 MG tablet Take 1 tablet (5 mg) by mouth daily 30 tablet 3     methocarbamol (ROBAXIN) 500 MG tablet Take 500 mg by mouth       predniSONE (DELTASONE) 10 MG tablet Take 40mg [id number] then [id number],, then 20mg qday until seen. 80 tablet 0     naproxen (NAPROSYN) 500 MG tablet Take 500 mg by mouth       augmented betamethasone dipropionate (DIPROLENE-AF) 0.05 % ointment Apply to affected areas of hand twice daily, everyday. Use Vaniply on top of this medication. Occlude with gloves at night. 50 g 3     clobetasol (TEMOVATE) 0.05 % ointment Apply thin layer twice daily to hands and feet.  Soak hands before application at bedtime. 60 g 11     FLUOXETINE HCL PO        BuPROPion HCl (WELLBUTRIN PO)        Allergies   Allergen Reactions     Sulfadiazine      Other reaction(s): Hives/ Urticaria - Allergy     Tramadol      Other reaction(s): Shortness Of Breath/Wheezing/Chest Tightness - Allergy     Atorvastatin      Other reaction(s): Rash - Allergy     Bronopol Other (See Comments)     Previous Positive (+) skin patch test     Bacitracin Rash     Patch testing     Glutaraldehyde Rash     2+ patch testing     Gold Rash     Mild patch test reaction     Insulin Detemir      Other reaction(s): Headaches/ Migraines - Intolerance     Lisinopril      Other reaction(s): Headaches/ Migraines - Intolerance     Penicillins Rash     Sulfa Drugs Rash         Review of Systems:  -As per HPI  .  -Skin: As above in  HPI. No additional skin concerns.    Physical exam:  Vitals: There were no vitals taken for this visit.  GEN: This is a well developed, well-nourished female in no acute distress, in a pleasant mood.    SKIN: Focused examination of the hands was performed.  -There are minimal pink scaly patches and plaques on the hands    -No other lesions of concern on areas examined.     Impression/Plan:  1. Eczematous dermatitis/ACD    We have exhausted the steroid sparing agents that the patient can do with her work schedule.  We may need to consider low dose prednisone for a time.  I discussed the side effects of this plan with the patient.  Taper prednisone down to 10 mg daily then 5 mg daily and hold.  We will try to find the least amount of prednisone to allow the patient to continue her activities of daily living        Follow-up in 6 weeks, earlier for new or changing lesions.       Staff Involved:  Staff Only

## 2017-08-21 NOTE — NURSING NOTE
Dermatology Rooming Note    Kasey Ibrahim's goals for this visit include:   Chief Complaint   Patient presents with     Contact Dermatitis Follow Up     Kasey is here today for a dermatitis follow up.      Charley Marquez MA

## 2017-10-02 ENCOUNTER — OFFICE VISIT (OUTPATIENT)
Dept: DERMATOLOGY | Facility: CLINIC | Age: 67
End: 2017-10-02

## 2017-10-02 DIAGNOSIS — L23.9 ALLERGIC CONTACT DERMATITIS, UNSPECIFIED TRIGGER: ICD-10-CM

## 2017-10-02 DIAGNOSIS — L30.9 CHRONIC DERMATITIS OF HANDS: Primary | ICD-10-CM

## 2017-10-02 DIAGNOSIS — L23.3 ALLERGIC CONTACT DERMATITIS DUE TO DRUGS IN CONTACT WITH SKIN: ICD-10-CM

## 2017-10-02 RX ORDER — PREDNISONE 5 MG/1
5 TABLET ORAL DAILY
Qty: 30 TABLET | Refills: 3 | Status: SHIPPED | OUTPATIENT
Start: 2017-10-02

## 2017-10-02 RX ORDER — BETAMETHASONE DIPROPIONATE 0.5 MG/G
OINTMENT, AUGMENTED TOPICAL
Qty: 50 G | Refills: 3 | Status: SHIPPED | OUTPATIENT
Start: 2017-10-02

## 2017-10-02 ASSESSMENT — PAIN SCALES - GENERAL: PAINLEVEL: MILD PAIN (3)

## 2017-10-02 NOTE — PROGRESS NOTES
"[facility] Dermatology Note      Dermatology Problem List:    1. Suspected ACD hands and feet:  BX 4/2014 showed spongiotic dermatitis with eos on hands:  DDx ACD vs atopic dermatitis vs numular dermatitis   -TREATMENT FAILURES:  nb uvb, MMF, acitretin, methotrexate (all prior to presenting at the ), minocycline ( 8/2016-3/20/17), prednisone, clobetasol 0.05% ointment, CSA 3mg/kg/day  -Previous patch testing 8/2013 at [facility]:  2+ glutaraldehyde; mild: gold and Lillian soft moisturizing cream. Doubtful: fragrance mix 2, dodecyl gallate (both of these attributed to irritant reaction and not considered true allergens). Previous positives to bacitracin, bronopol, and mercapto mix were not reproducible; Dr. Quinonez recommended avoidance of bacitracin. Avoid leather.  - Cannot use dapsone due to sulfa allergy, cannot due UVA1 due to work schedule  - Unable to continue dupixent due to cost  - some improvement with prednisone (prev on 10 but flaring with taper to 5.) Trial 7.5mg for 6 weeks until next appt  - Continue betamethasone and alternate with retinol cream    CC:   Chief Complaint   Patient presents with     Derm Problem     Kasey mulugeta d elos santos today for a 6 week follow up. Kasey notes\" I ran out of the predinsone but had to refill it, the cuts on my hands are getting bad again\"         Encounter Date: Oct 2, 2017    History of Present Illness:  Ms. Kasey Ibrahim is a 67 year old female who presents as a follow-up for hand and foot dermatitis. The patient was last seen August 2017. She states that she thinks her hands are doing better at this time on the Prednisone 5. She switched over from prednisone 10mg about a month ago after being on the 10 for about a month. She thinks that her skin was better on the 10mg but she is concerned about the long term safety of prednisone. She has tried many treatments over the years, most recently Dupixant which was too expensive to continue using. She has been " "using an OTC [employer] cream called retinol (contains simethicone and retinol) which she thinks that works the best of anything she has tried. She occasionally uses the clobetasol but notes that this does not seem to help and if she uses it under occlusion at night her hands get too hot and she feel like her hands are worse in the morning. She has tried vaseline but prefers the Retinol as it makes her hands \"soft and smooth\". She believes that her stress level is impacting her hands as well. She is currently on Wellbutrin and Fluoxetine.     Past Medical History:   Patient Active Problem List   Diagnosis     Cholangitis     Past Medical History:   Diagnosis Date     Depression      Hand dermatitis      Past Surgical History:   Procedure Laterality Date     FOOT SURGERY       PHACOEMULSIFICATION WITH STANDARD INTRAOCULAR LENS IMPLANT Left 7/3/2017    Procedure: PHACOEMULSIFICATION WITH STANDARD INTRAOCULAR LENS IMPLANT;  Left cataract removal with implant;  Surgeon: Dominguez Payne MD;  Location: [facility] OR     PHACOEMULSIFICATION WITH STANDARD INTRAOCULAR LENS IMPLANT Right 7/24/2017    Procedure: PHACOEMULSIFICATION WITH STANDARD INTRAOCULAR LENS IMPLANT;  Right cataract removal with implant;  Surgeon: Dominguez Payne MD;  Location: [facility] OR       Social History:  The patient works at [employer].    Family History:  Non-contributory    Medications:  Current Outpatient Prescriptions   Medication Sig Dispense Refill     methocarbamol (ROBAXIN) 500 MG tablet Take 500 mg by mouth       predniSONE (DELTASONE) 5 MG tablet Take 1 tablet (5 mg) by mouth daily 30 tablet 3     predniSONE (DELTASONE) 10 MG tablet Take 40mg eqalz6ewif then [id number],, then 20mg qday until seen. 80 tablet 0     naproxen (NAPROSYN) 500 MG tablet Take 500 mg by mouth       augmented betamethasone dipropionate (DIPROLENE-AF) 0.05 % ointment Apply to affected areas of hand twice daily, everyday. Use Vaniply on top of this medication. " Occlude with gloves at night. 50 g 3     clobetasol (TEMOVATE) 0.05 % ointment Apply thin layer twice daily to hands and feet.  Soak hands before application at bedtime. 60 g 11     FLUOXETINE HCL PO        BuPROPion HCl (WELLBUTRIN PO)        Allergies   Allergen Reactions     Sulfadiazine      Other reaction(s): Hives/ Urticaria - Allergy     Tramadol      Other reaction(s): Shortness Of Breath/Wheezing/Chest Tightness - Allergy     Atorvastatin      Other reaction(s): Rash - Allergy     Bronopol Other (See Comments)     Previous Positive (+) skin patch test     Bacitracin Rash     Patch testing     Glutaraldehyde Rash     2+ patch testing     Gold Rash     Mild patch test reaction     Insulin Detemir      Other reaction(s): Headaches/ Migraines - Intolerance     Lisinopril      Other reaction(s): Headaches/ Migraines - Intolerance     Penicillins Rash     Sulfa Drugs Rash         Review of Systems:  -As per HPI  -Skin: As above in HPI. No additional skin concerns.    Physical exam:  Vitals: There were no vitals taken for this visit.  GEN: This is a well developed, well-nourished female in no acute distress, in a pleasant mood.    SKIN: Focused examination of the hands was performed.  -There are pink xerotic scaly patches and plaques on the hands most prominent in the 1st web space and hypothenar eminences bilaterally and on the right posterior heel  -on the left upper arm there is a 1cm light brown waxy papule on a purpuric base  -No other lesions of concern on areas examined.     Impression/Plan:  1. Eczematous dermatitis/ACD    We have exhausted the steroid sparing agents and Dupixant (2/2 cost). We will try to find the least amount of prednisone to allow the patient to continue her activities of daily living. Currently has active plaques on the hands and feet on 5mg so will increase for now.    Prednisone 7.5mg for the next 6 weeks until return visit    Continue to use betamethasone and alternate with retinol  cream    Refilled scripts for betamethasone, prednisone    2. Irritated SK, left upper arm, monitor    Follow-up in 6 weeks, earlier for new or changing lesions.     Staffed with Dr Zuniga    Staff Involved:  Resident (Gabriella Trotter MD) / Staff (as above)    .I, Randee Zuniga MD, saw this patient with the resident and agree with the resident s findings and plan of care as documented in the resident s note.

## 2017-10-02 NOTE — MR AVS SNAPSHOT
After Visit Summary   10/2/2017    Kasey Ibrahim    MRN: [id number]           Patient Information     Date Of Birth          1950        Visit Information        Provider Department      10/2/2017 9:15 AM Randee Zuniga MD [facility]        Today's Diagnoses     Allergic contact dermatitis, unspecified trigger        Allergic contact dermatitis due to drugs in contact with skin          Care Instructions    Continue to use betamethasone and alternate with retinol    Continue to use take prednisone but take 1.5 pills for a total of 7.5mg daily     Return to clinic in 6 weeks          Follow-ups after your visit        Follow-up notes from your care team     Return in about 6 weeks (around 11/13/2017).      Your next 10 appointments already scheduled     Nov 20, 2017  8:15 AM CST   (Arrive by 8:00 AM)   Return Visit with Randee Zuniga MD   [facility] ([facility] [place])    [address] [place] [address] [phone]              Who to contact     Please call your clinic at [phone] to:    Ask questions about your health    Make or cancel appointments    Discuss your medicines    Learn about your test results    Speak to your doctor   If you have compliments or concerns about an experience at your clinic, or if you wish to file a complaint, please contact [facility] Patient Relations at [phone] or email us at [email or web address]         Additional Information About Your Visit        MyChart Information     [employer] gives you secure access to your electronic health record. If you see a primary care provider, you can also send messages to your care team and make appointments. If you have questions, please call your primary care clinic.  If you do not have a primary care provider, please call [phone] and they will assist you.      [employer] is an electronic gateway  that provides easy, online access to your medical records. With [employer], you can request a clinic appointment, read your test results, renew a prescription or communicate with your care team.     To access your existing account, please contact your [facility] or call [phone] for assistance.        Care EveryWhere ID     This is your Care EveryWhere ID. This could be used by other organizations to access your [place] medical records  [id number]         Blood Pressure from Last 3 Encounters:   07/24/17 114/63   07/03/17 115/59   04/17/17 131/88    Weight from Last 3 Encounters:   07/24/17 78.9 kg (174 lb)   07/03/17 77.1 kg (170 lb)   03/20/17 80.6 kg (177 lb 12.8 oz)              Today, you had the following     No orders found for display         Where to get your medicines      These medications were sent to [facility] [address]   [address] [facility] [address]     Phone:  [phone]     augmented betamethasone dipropionate 0.05 % ointment    predniSONE 5 MG tablet          Primary Care Provider Office Phone # Fax #    Ankit Dominguez Martini [phone]       [facility] [address] [facility] [address]        Equal Access to Services     LISA BALLARD : Hadii manuela mireleso Sokasandra, waaxda luqadaha, qaybta kaalmada adeegyada, rut figueroa. So [facility] [phone].    ATENCIÓN: Si habla español, tiene a young disposición servicios gratuitos de asistencia lingüística. Llame al [phone].    We comply with applicable federal civil rights laws and [place] laws. We do not discriminate on the basis of race, color, national origin, age, disability, sex, sexual orientation, or gender identity.            Thank you!     Thank you for choosing [facility]  for your care. Our goal is always to provide you with excellent care. Hearing back from our patients is one way we  can continue to improve our services. Please take a few minutes to complete the written survey that you may receive in the mail after your visit with us. Thank you!             Your Updated Medication List - Protect others around you: Learn how to safely use, store and throw away your medicines at www.disposemymeds.org.          This list is accurate as of: 10/2/17 10:01 AM.  Always use your most recent med list.                   Brand Name Dispense Instructions for use Diagnosis    augmented betamethasone dipropionate 0.05 % ointment    DIPROLENE-AF    50 g    Apply to affected areas of hand twice daily, everyday. Use Vaniply on top of this medication. Occlude with gloves at night.    Allergic contact dermatitis due to drugs in contact with skin       clobetasol 0.05 % ointment    TEMOVATE    60 g    Apply thin layer twice daily to hands and feet.  Soak hands before application at bedtime.    Allergic contact dermatitis, unspecified trigger, Chronic dermatitis of hands       FLUOXETINE HCL PO           methocarbamol 500 MG tablet    ROBAXIN     Take 500 mg by mouth        naproxen 500 MG tablet    NAPROSYN     Take 500 mg by mouth        * predniSONE 10 MG tablet    DELTASONE    80 tablet    Take 40mg [id number] then [id number],, then 20mg qday until seen.    Chronic dermatitis of hands, Allergic contact dermatitis, unspecified trigger       * predniSONE 5 MG tablet    DELTASONE    30 tablet    Take 1 tablet (5 mg) by mouth daily    Allergic contact dermatitis, unspecified trigger       WELLBUTRIN PO           * Notice:  This list has 2 medication(s) that are the same as other medications prescribed for you. Read the directions carefully, and ask your doctor or other care provider to review them with you.

## 2017-10-02 NOTE — LETTER
"10/2/2017       RE: Kasey Ibrahim  [address] [facility] [address]     Dear Colleague,    Thank you for referring your patient, Kasey Ibrahim, to the [facility] at [facility]. Please see a copy of my visit note below.    [facility] Dermatology Note      Dermatology Problem List:    1. Suspected ACD hands and feet:  BX 4/2014 showed spongiotic dermatitis with eos on hands:  DDx ACD vs atopic dermatitis vs numular dermatitis   -TREATMENT FAILURES:  nb uvb, MMF, acitretin, methotrexate (all prior to presenting at the ), minocycline ( 8/2016-3/20/17), prednisone, clobetasol 0.05% ointment, CSA 3mg/kg/day  -[place] [facility]  2+ glutaraldehyde; mild: gold and Lillian soft moisturizing cream. Doubtful: fragrance mix 2, dodecyl gallate (both of these attributed to irritant reaction and not considered true allergens). Previous positives to bacitracin, bronopol, and mercapto mix were not reproducible; Dr. Quinonez recommended avoidance of bacitracin. Avoid leather.  - Cannot use dapsone due to sulfa allergy, cannot due UV[employer]1 due to work schedule  -  Unable to continue dupixent due to cost  - some improvement with prednisone (prev on 10 but flaring with taper to 5.) Trial 7.5mg for 6 weeks until next appt  - Continue betamethasone and alternate with retinol cream    CC:   Chief Complaint   Patient presents with     Derm Problem     Kasey de los santos today for a 6 week follow up. Kasey notes\" I ran out of the predinsone but had to refill it, the cuts on my hands are getting bad again\"         Encounter Date: Oct 2, 2017    History of Present Illness:  Ms. Kasey Ibrahim is a 67 year old female who presents as a follow-up for hand and foot dermatitis. The patient was last seen August 2017. She states that she thinks her hands are doing better at this time on the Prednisone 5. She switched over from prednisone 10mg about a month " "ago after being on the 10 for about a month. She thinks that her skin was better on the 10mg but she is concerned about the long term safety of prednisone. She has tried many treatments over the years, most recently Dupixant which was too expensive to continue using. She has been using an OTC [employer] cream called retinol (contains simethicone and retinol) which she thinks that works the best of anything she has tried. She occasionally uses the clobetasol but notes that this does not seem to help and if she uses it under occlusion at night her hands get too hot and she feel like her hands are worse in the morning. She has tried vaseline but prefers the Retinol as it makes her hands \"soft and smooth\". She believes that her stress level is impacting her hands as well. She is currently on Wellbutrin and Fluoxetine.     Past Medical History:   Patient Active Problem List   Diagnosis     Cholangitis     Past Medical History:   Diagnosis Date     Depression      Hand dermatitis      Past Surgical History:   Procedure Laterality Date     FOOT SURGERY       PHACOEMULSIFICATION WITH STANDARD INTRAOCULAR LENS IMPLANT Left 7/3/2017    Procedure: PHACOEMULSIFICATION WITH STANDARD INTRAOCULAR LENS IMPLANT;  Left cataract removal with implant;  Surgeon: Dominguez Payne MD;  Location: [facility] OR     PHACOEMULSIFICATION WITH STANDARD INTRAOCULAR LENS IMPLANT Right 7/24/2017    Procedure: PHACOEMULSIFICATION WITH STANDARD INTRAOCULAR LENS IMPLANT;  Right cataract removal with implant;  Surgeon: Dominguez Payne MD;  Location: [place] OR       Social History:  The patient works at [employer].    Family History:  Non-contributory    Medications:  Current Outpatient Prescriptions   Medication Sig Dispense Refill     methocarbamol (ROBAXIN) 500 MG tablet Take 500 mg by mouth       predniSONE (DELTASONE) 5 MG tablet Take 1 tablet (5 mg) by mouth daily 30 tablet 3     predniSONE (DELTASONE) 10 MG tablet Take 40mg [id number] then " [id number],, then 20mg qday until seen. 80 tablet 0     naproxen (NAPROSYN) 500 MG tablet Take 500 mg by mouth       augmented betamethasone dipropionate (DIPROLENE-AF) 0.05 % ointment Apply to affected areas of hand twice daily, everyday. Use Vaniply on top of this medication. Occlude with gloves at night. 50 g 3     clobetasol (TEMOVATE) 0.05 % ointment Apply thin layer twice daily to hands and feet.  Soak hands before application at bedtime. 60 g 11     FLUOXETINE HCL PO        BuPROPion HCl (WELLBUTRIN PO)        Allergies   Allergen Reactions     Sulfadiazine      Other reaction(s): Hives/ Urticaria - Allergy     Tramadol      Other reaction(s): Shortness Of Breath/Wheezing/Chest Tightness - Allergy     Atorvastatin      Other reaction(s): Rash - Allergy     Bronopol Other (See Comments)     Previous Positive (+) skin patch test     Bacitracin Rash     Patch testing     Glutaraldehyde Rash     2+ patch testing     Gold Rash     Mild patch test reaction     Insulin Detemir      Other reaction(s): Headaches/ Migraines - Intolerance     Lisinopril      Other reaction(s): Headaches/ Migraines - Intolerance     Penicillins Rash     Sulfa Drugs Rash         Review of Systems:  -As per HPI  -Skin: As above in HPI. No additional skin concerns.    Physical exam:  Vitals: There were no vitals taken for this visit.  GEN: This is a well developed, well-nourished female in no acute distress, in a pleasant mood.    SKIN: Focused examination of the hands was performed.  -There are pink xerotic scaly patches and plaques on the hands most prominent in the 1st web space and hypothenar eminences bilaterally and on the right posterior heel  -on the left upper arm there is a 1cm light brown waxy papule on a purpuric base  -No other lesions of concern on areas examined.     Impression/Plan:  1. Eczematous dermatitis/ACD    We have exhausted the steroid sparing agents and Dupixant (2/2 cost). We will try to find the least amount  of prednisone to allow the patient to continue her activities of daily living. Currently has active plaques on the hands and feet on 5mg so will increase for now.    Prednisone 7.5mg for the next 6 weeks until return visit    Continue to use betamethasone and alternate with retinol cream    Refilled scripts for betamethasone, prednisone    2. Irritated SK, left upper arm, monitor    Follow-up in 6 weeks, earlier for new or changing lesions.     Staffed with Dr Zuniga    Staff Involved:  Resident (Gabriella Trotter MD) / Staff (as above)    .I, Randee Zuniga MD, saw this patient with the resident and agree with the resident s findings and plan of care as documented in the resident s note.

## 2017-10-02 NOTE — PATIENT INSTRUCTIONS
Continue to use betamethasone and alternate with retinol    Continue to use take prednisone but take 1.5 pills for a total of 7.5mg daily     Return to clinic in 6 weeks

## 2017-10-02 NOTE — NURSING NOTE
"Dermatology Rooming Note    Kasey Ibrahim's goals for this visit include:   Chief Complaint   Patient presents with     Derm Problem     Kasey dalal shere today for a 6 week follow up. Kasey notes\" I ran out of the predinsone but had to refill it, the cuts on my hands are getting bad again\"     Charley Marquez, RMTERESITA    "

## 2017-11-20 ENCOUNTER — OFFICE VISIT (OUTPATIENT)
Dept: DERMATOLOGY | Facility: CLINIC | Age: 67
End: 2017-11-20

## 2017-11-20 DIAGNOSIS — L23.3 ALLERGIC CONTACT DERMATITIS DUE TO DRUGS IN CONTACT WITH SKIN: Primary | ICD-10-CM

## 2017-11-20 RX ORDER — PREDNISONE 1 MG/1
8 TABLET ORAL DAILY
Qty: 240 TABLET | Refills: 3 | Status: SHIPPED | OUTPATIENT
Start: 2017-11-20

## 2017-11-20 ASSESSMENT — PAIN SCALES - GENERAL: PAINLEVEL: NO PAIN (0)

## 2017-11-20 NOTE — MR AVS SNAPSHOT
After Visit Summary   11/20/2017    Kasey Ibrahim    MRN: [id number]           Patient Information     Date Of Birth          1950        Visit Information        Provider Department      11/20/2017 8:15 AM Randee Zuniga MD [facility]        Today's Diagnoses     Allergic contact dermatitis due to drugs in contact with skin    -  1       Follow-ups after your visit        Your next 10 appointments already scheduled     Jan 22, 2018  8:00 AM CST   (Arrive by 7:45 AM)   Return Visit with MD CLARICE Robbins [facility] ([facility] [place])    [address] [place] [address] [phone]              Who to contact     Please call your clinic at [phone] to:    Ask questions about your health    Make or cancel appointments    Discuss your medicines    Learn about your test results    Speak to your doctor   If you have compliments or concerns about an experience at your clinic, or if you wish to file a complaint, please contact [facility] Patient Relations at [phone] or email us at [email or web address]         Additional Information About Your Visit        MyChart Information     [employer] gives you secure access to your electronic health record. If you see a primary care provider, you can also send messages to your care team and make appointments. If you have questions, please call your primary care clinic.  If you do not have a primary care provider, please call [phone] and they will assist you.      [employer] is an electronic gateway that provides easy, online access to your medical records. With [employer], you can request a clinic appointment, read your test results, renew a prescription or communicate with your care team.     To access your existing account, please contact your [facility] or call [phone] for assistance.        Care  EveryWhere ID     This is your Care EveryWhere ID. This could be used by other organizations to access your [place] medical records  [id number]         Blood Pressure from Last 3 Encounters:   07/24/17 114/63   07/03/17 115/59   04/17/17 131/88    Weight from Last 3 Encounters:   07/24/17 78.9 kg (174 lb)   07/03/17 77.1 kg (170 lb)   03/20/17 80.6 kg (177 lb 12.8 oz)              Today, you had the following     No orders found for display         Today's Medication Changes          These changes are accurate as of: 11/20/17  8:21 AM.  If you have any questions, ask your nurse or doctor.               These medicines have changed or have updated prescriptions.        Dose/Directions    * predniSONE 10 MG tablet   Commonly known as:  DELTASONE   This may have changed:  Another medication with the same name was added. Make sure you understand how and when to take each.   Used for:  Chronic dermatitis of hands, Allergic contact dermatitis, unspecified trigger   Changed by:  Randee Zuniga MD        Take 40mg [id number] then [id number],, then 20mg qday until seen.   Quantity:  80 tablet   Refills:  0       * predniSONE 5 MG tablet   Commonly known as:  DELTASONE   This may have changed:  Another medication with the same name was added. Make sure you understand how and when to take each.   Used for:  Allergic contact dermatitis, unspecified trigger   Changed by:  Randee Zuniga MD        Dose:  5 mg   Take 1 tablet (5 mg) by mouth daily   Quantity:  30 tablet   Refills:  3       * predniSONE 1 MG tablet   Commonly known as:  DELTASONE   This may have changed:  You were already taking a medication with the same name, and this prescription was added. Make sure you understand how and when to take each.   Used for:  Allergic contact dermatitis due to drugs in contact with skin   Changed by:  Randee Zuniga MD        Dose:  8 mg   Take 8 tablets (8 mg) by mouth daily   Quantity:  240 tablet    Refills:  3       * Notice:  This list has 3 medication(s) that are the same as other medications prescribed for you. Read the directions carefully, and ask your doctor or other care provider to review them with you.         Where to get your medicines      These medications were sent to [id number] - [place], [place] - [address]  [address] [facility] [address]     Phone:  [phone]     predniSONE 1 MG tablet                Primary Care Provider Office Phone # Fax #    Ankit Dominguez Martini [phone]       [facility] [address] [facility] [address]        Equal Access to Services     [facility] : Hadii manuela Chiu, jame rice, kelvin sultana, rut figueroa. So [facility] [phone].    ATENCIÓN: Si habla español, tiene a young disposición servicios gratuitos de asistencia lingüística. [facility] [phone].    We comply with applicable federal civil rights laws and [place] laws. We do not discriminate on the basis of race, color, national origin, age, disability, sex, sexual orientation, or gender identity.            Thank you!     Thank you for choosing [facility]  for your care. Our goal is always to provide you with excellent care. Hearing back from our patients is one way we can continue to improve our services. Please take a few minutes to complete the written survey that you may receive in the mail after your visit with us. Thank you!             Your Updated Medication List - Protect others around you: Learn how to safely use, store and throw away your medicines at www.disposemymeds.org.          This list is accurate as of: 11/20/17  8:21 AM.  Always use your most recent med list.                   Brand Name Dispense Instructions for use Diagnosis    augmented betamethasone dipropionate 0.05 % ointment    DIPROLENE-AF    50 g    Apply to affected areas of hand twice daily,  everyday. Use Vaniply on top of this medication. Occlude with gloves at night.    Allergic contact dermatitis due to drugs in contact with skin       clobetasol 0.05 % ointment    TEMOVATE    60 g    Apply thin layer twice daily to hands and feet.  Soak hands before application at bedtime.    Allergic contact dermatitis, unspecified trigger, Chronic dermatitis of hands       FLUOXETINE HCL PO           methocarbamol 500 MG tablet    ROBAXIN     Take 500 mg by mouth        naproxen 500 MG tablet    NAPROSYN     Take 500 mg by mouth        * predniSONE 10 MG tablet    DELTASONE    80 tablet    Take 40mg [id number] then [id number],, then 20mg qday until seen.    Chronic dermatitis of hands, Allergic contact dermatitis, unspecified trigger       * predniSONE 5 MG tablet    DELTASONE    30 tablet    Take 1 tablet (5 mg) by mouth daily    Allergic contact dermatitis, unspecified trigger       * predniSONE 1 MG tablet    DELTASONE    240 tablet    Take 8 tablets (8 mg) by mouth daily    Allergic contact dermatitis due to drugs in contact with skin       WELLBUTRIN PO           * Notice:  This list has 3 medication(s) that are the same as other medications prescribed for you. Read the directions carefully, and ask your doctor or other care provider to review them with you.

## 2017-11-20 NOTE — PROGRESS NOTES
[facility] Dermatology Note      Dermatology Problem List:  1. Suspected ACD hands and feet:  BX 4/2014 showed spongiotic dermatitis with eos on hands:  DDx ACD vs atopic dermatitis vs numular dermatitis   -TREATMENT FAILURES:  nb uvb, MMF, acitretin, methotrexate (all prior to presenting at the ), minocycline ( 8/2016-3/20/17), prednisone, clobetasol 0.05% ointment, CSA 3mg/kg/day  -[place] [facility] [place] glutar[place]; mild: gold and Lillian soft moisturizing cream. Doubtful: fragrance mix 2, dodecyl gallate (both of these attributed to irritant reaction and not considered true allergens). Previous positives to bacitracin, bronopol, and mercapto mix were not reproducible; Dr. Quinonez recommended avoidance of bacitracin. Avoid leather.  - Cannot use dapsone due to sulfa allergy, cannot due UVA1 due to work schedule  - Unable to continue dupixent due to cost  - some improvement with prednisone (prev on 10 but flaring with taper to 5.) Trial 8mg until next appt  - Continue betamethasone and alternate with retinol cream    Encounter Date: Nov 20, 2017    CC:  Chief Complaint   Patient presents with     Derm Problem     Kasey is here for a follow up for her hands.  States they're the same since her last visit, and starting to get worse.  Wants her left foot's big toe checked.           History of Present Illness:  Ms. Kasey Ibrahim is a 67 year old female who presents as a follow-up for hand and foot dermatitis. The patient was last seen 10/02/2017 when she was started on 7.5mg prednisone daily and instructed to continue betamethasone cream and retinol lotion. She feels that her dermatitis is unchanged, she still gets very irritated, dry, and cracking skin on her hands, especially her thumbs. She also gets cracking on her feet. She feels that she did the best when on a higher dose of prednisone in the past but has also gained close to 20lb since being on it. No new concerns  today.    Past Medical History:   Patient Active Problem List   Diagnosis     Cholangitis     Past Medical History:   Diagnosis Date     Depression      Hand dermatitis      Past Surgical History:   Procedure Laterality Date     FOOT SURGERY       PHACOEMULSIFICATION WITH STANDARD INTRAOCULAR LENS IMPLANT Left 7/3/2017    Procedure: PHACOEMULSIFICATION WITH STANDARD INTRAOCULAR LENS IMPLANT;  Left cataract removal with implant;  Surgeon: Dominguez Payne MD;  Location: [facility] OR     PHACOEMULSIFICATION WITH STANDARD INTRAOCULAR LENS IMPLANT Right 7/24/2017    Procedure: PHACOEMULSIFICATION WITH STANDARD INTRAOCULAR LENS IMPLANT;  Right cataract removal with implant;  Surgeon: Dominguez Payne MD;  Location: [facility] OR       Social History:  Patient works at [employer].    Family History:  Not assessed.    Medications:  Current Outpatient Prescriptions   Medication Sig Dispense Refill     predniSONE (DELTASONE) 5 MG tablet Take 1 tablet (5 mg) by mouth daily 30 tablet 3     augmented betamethasone dipropionate (DIPROLENE-AF) 0.05 % ointment Apply to affected areas of hand twice daily, everyday. Use Vaniply on top of this medication. Occlude with gloves at night. 50 g 3     predniSONE (DELTASONE) 10 MG tablet Take 40mg [id number] then [id number],, then 20mg qday until seen. 80 tablet 0     FLUOXETINE HCL PO        BuPROPion HCl (WELLBUTRIN PO)        methocarbamol (ROBAXIN) 500 MG tablet Take 500 mg by mouth       naproxen (NAPROSYN) 500 MG tablet Take 500 mg by mouth       clobetasol (TEMOVATE) 0.05 % ointment Apply thin layer twice daily to hands and feet.  Soak hands before application at bedtime. (Patient not taking: Reported on 11/20/2017) 60 g 11     Allergies   Allergen Reactions     Sulfadiazine      Other reaction(s): Hives/ Urticaria - Allergy     Tramadol      Other reaction(s): Shortness Of Breath/Wheezing/Chest Tightness - Allergy     Atorvastatin      Other reaction(s): Rash - Allergy      Bronopol Other (See Comments)     Previous Positive (+) skin patch test     Bacitracin Rash     Patch testing     Glutaraldehyde Rash     2+ patch testing     Gold Rash     Mild patch test reaction     Insulin Detemir      Other reaction(s): Headaches/ Migraines - Intolerance     Lisinopril      Other reaction(s): Headaches/ Migraines - Intolerance     Penicillins Rash     Sulfa Drugs Rash         Review of Systems:  -As per HPI  -Constitutional: The patient denies fatigue, fevers, chills, unintended weight loss, and night sweats.  -HEENT: Patient denies nonhealing oral sores.  -Skin: As above in HPI. No additional skin concerns.    Physical exam:  Vitals: There were no vitals taken for this visit.  GEN: This is a well developed, well-nourished female in no acute distress, in a pleasant mood.    SKIN: Focused examination of the arms, hands, and feet was performed.  -Pink xerotic scaly patches and plaques on the hands most prominent in the 1st web space and hypothenar eminences bilaterally and on the right posterior heel.  -Light brown waxy papule on left upper arm  -No other lesions of concern on areas examined.     Impression/Plan:  1. Eczematous dermatitis/ACD    Increase prednisone to 8mg daily until next appointment    Will explore the option of a wand for home NBUVB light therapy. Information sent to insurance    Continue to use betamethasone and retinol cream    Instructed patient to bring all of her products with her to her next appointment to review again for ACD    2. SK, left upper arm, monitor    Follow-up in 6-8 weeks, earlier for new or changing lesions.     Staff Involved:  Scribed by Edna Vasquez, MS4 for Dr. Zuniga.    .The medical student acted as scribe for this encounter.  The encounter documented above was completely performed by myself.  Randee Zuniga MD

## 2017-11-20 NOTE — NURSING NOTE
Dermatology Rooming Note    Kasey Ibrahim's goals for this visit include:   Chief Complaint   Patient presents with     Derm Problem     Kasey is here for a follow up for her hands.  States they're the same since her last visit, and starting to get worse.  Wants her left foot's big toe checked.         Shea Ding, PEARLN

## 2017-11-20 NOTE — LETTER
11/20/2017       RE: Kasey Ibrahim  [address] [facility] [address]     Dear Colleague,    Thank you for referring your patient, Kasey Ibrahim, to the [facility] DERMATOLOGY at [facility]. Please see a copy of my visit note below.    [facility] Dermatology Note      Dermatology Problem List:  1. Suspected ACD hands and feet:  BX 4/2014 showed spongiotic dermatitis with eos on hands:  DDx ACD vs atopic dermatitis vs numular dermatitis   -TREATMENT FAILURES:  nb uvb, MMF, acitretin, methotrexate (all prior to presenting at the ), minocycline ( 8/2016-3/20/17), prednisone, clobetasol 0.05% ointment, CSA 3mg/kg/day  -[place] [facility] [place]; mild: gold and Lillian soft moisturizing cream. Doubtful: fragrance mix 2, dodecyl gallate (both of these attributed to irritant reaction and not considered true allergens). Previous positives to bacitracin, bronopol, and mercapto mix were not reproducible; Dr. Quinonez recommended avoidance of bacitracin. Avoid leather.  - Cannot use dapsone due to sulfa allergy, cannot due UVA1 due to work schedule  - Unable to continue dupixent due to cost  - some improvement with prednisone (prev on 10 but flaring with taper to 5.) Trial 8mg until next appt  - Continue betamethasone and alternate with retinol cream    Encounter Date: Nov 20, 2017    CC:  Chief Complaint   Patient presents with     Derm Problem     Kasey is here for a follow up for her hands.  States they're the same since her last visit, and starting to get worse.  Wants her left foot's big toe checked.           History of Present Illness:  Ms. Kasey Ibrahim is a 67 year old female who presents as a follow-up for hand and foot dermatitis. The patient was last seen 10/02/2017 when she was started on 7.5mg prednisone daily and instructed to continue betamethasone cream and retinol lotion. She feels that her dermatitis is  unchanged, she still gets very irritated, dry, and cracking skin on her hands, especially her thumbs. She also gets cracking on her feet. She feels that she did the best when on a higher dose of prednisone in the past but has also gained close to 20lb since being on it. No new concerns today.    Past Medical History:   Patient Active Problem List   Diagnosis     Cholangitis     Past Medical History:   Diagnosis Date     Depression      Hand dermatitis      Past Surgical History:   Procedure Laterality Date     FOOT SURGERY       PHACOEMULSIFICATION WITH STANDARD INTRAOCULAR LENS IMPLANT Left 7/3/2017    Procedure: PHACOEMULSIFICATION WITH STANDARD INTRAOCULAR LENS IMPLANT;  Left cataract removal with implant;  Surgeon: Dominguez Payne MD;  Location: [facility] OR     PHACOEMULSIFICATION WITH STANDARD INTRAOCULAR LENS IMPLANT Right 7/24/2017    Procedure: PHACOEMULSIFICATION WITH STANDARD INTRAOCULAR LENS IMPLANT;  Right cataract removal with implant;  Surgeon: Dominguez Payne MD;  Location: [place] OR       Social History:  Patient works at [employer].    Family History:  Not assessed.    Medications:  Current Outpatient Prescriptions   Medication Sig Dispense Refill     predniSONE (DELTASONE) 5 MG tablet Take 1 tablet (5 mg) by mouth daily 30 tablet 3     augmented betamethasone dipropionate (DIPROLENE-AF) 0.05 % ointment Apply to affected areas of hand twice daily, everyday. Use Vaniply on top of this medication. Occlude with gloves at night. 50 g 3     predniSONE (DELTASONE) 10 MG tablet Take 40mg [id number] then [id number],, then 20mg qday until seen. 80 tablet 0     FLUOXETINE HCL PO        BuPROPion HCl (WELLBUTRIN PO)        methocarbamol (ROBAXIN) 500 MG tablet Take 500 mg by mouth       naproxen (NAPROSYN) 500 MG tablet Take 500 mg by mouth       clobetasol (TEMOVATE) 0.05 % ointment Apply thin layer twice daily to hands and feet.  Soak hands before application at bedtime. (Patient not taking:  Reported on 11/20/2017) 60 g 11     Allergies   Allergen Reactions     Sulfadiazine      Other reaction(s): Hives/ Urticaria - Allergy     Tramadol      Other reaction(s): Shortness Of Breath/Wheezing/Chest Tightness - Allergy     Atorvastatin      Other reaction(s): Rash - Allergy     Bronopol Other (See Comments)     Previous Positive (+) skin patch test     Bacitracin Rash     Patch testing     Glutaraldehyde Rash     2+ patch testing     Gold Rash     Mild patch test reaction     Insulin Detemir      Other reaction(s): Headaches/ Migraines - Intolerance     Lisinopril      Other reaction(s): Headaches/ Migraines - Intolerance     Penicillins Rash     Sulfa Drugs Rash         Review of Systems:  -As per HPI  -Constitutional: The patient denies fatigue, fevers, chills, unintended weight loss, and night sweats.  -HEENT: Patient denies nonhealing oral sores.  -Skin: As above in HPI. No additional skin concerns.    Physical exam:  Vitals: There were no vitals taken for this visit.  GEN: This is a well developed, well-nourished female in no acute distress, in a pleasant mood.    SKIN: Focused examination of the arms, hands, and feet was performed.  -Pink xerotic scaly patches and plaques on the hands most prominent in the 1st web space and hypothenar eminences bilaterally and on the right posterior heel.  -Light brown waxy papule on left upper arm  -No other lesions of concern on areas examined.     Impression/Plan:  1. Eczematous dermatitis/ACD    Increase prednisone to 8mg daily until next appointment    Will explore the option of a wand for home NBUVB light therapy. Information sent to insurance    Continue to use betamethasone and retinol cream    Instructed patient to bring all of her products with her to her next appointment to review again for ACD    2. SK, left upper arm, monitor    Follow-up in 6-8 weeks, earlier for new or changing lesions.     Staff Involved:  Scribed by Edna Vasquez, MS4 for Dr. Zuniga.     .The medical student acted as scribe for this encounter.  The encounter documented above was completely performed by myself.  Randee Zuniga MD

## 2017-11-27 ENCOUNTER — TELEPHONE (OUTPATIENT)
Dept: DERMATOLOGY | Facility: CLINIC | Age: 67
End: 2017-11-27

## 2017-11-27 NOTE — TELEPHONE ENCOUNTER
Faxed order form, signed medical letter, and copies of insurance cards to [facility] at [phone]

## 2017-12-17 NOTE — OP NOTE
OPHTHALMOLOGY OPERATIVE NOTE    PATIENT: Kasey Ibrahim  DATE OF SURGERY: 7/24/2017  PREOPERATIVE DIAGNOSIS:  Senile Nuclear Cataract, Right eye  POSTOPERATIVE DIAGNOSIS:  Senile Nuclear Cataract, Right eye  OPERATIVE PROCEDURE:  Phacoemulsification with placement of intraocular lens  SURGEON:  Dominguez Payne MD  ANESTHESIA:  Topical / MAC  EBL:  None  SPECIMENS:  None  COMPLICATIONS:  None    PROCEDURE:  The patient was brought to the operating room at [facility].  The right eye was prepped and draped in the usual fashion for cataract surgery.  A wire lid speculum was inserted.  A super sharp blade was used to make a paracentesis at the 11 O'clock position.  The super sharp blade was used to make a partial thickness temporal groove, which was 3 mm in length.  0.8 mL of non-preserved epi-Shugarcaine was injected into the anterior chamber.  Viscoelastic was used to inflate the anterior chamber through a cannula.  A 2.5 mm microkeratome was used to make a temporal clear corneal incision in a two-plane fashion.  A cystotome needle and forceps were used to make a capsulorrhexis.  Hydrodissection and hydrodelineation were performed with Balance Salt Solution.  The lens was then phacoemulsified and removed without complications.  The cortical material was removed with bimanual irrigation and aspiration.  The capsular bag was filled with viscoelastic.  A posterior chamber intraocular lens, preselected and recorded, was folded and inserted into the capsular bag.  The viscoelastic was removed with the irrigation and aspiration tip.  Balanced Salt Solution was used to refill the anterior chamber.  The wounds were checked for water tightness and required no suture.  The wire lid speculum was removed.  The patient's right eye was cleaned and a drop of each post-operative drop was placed, followed by a george shield.  The patient tolerated the procedure well, and there were no  complications.      Dominguez Payne MD   none

## 2018-03-30 ENCOUNTER — TELEPHONE (OUTPATIENT)
Dept: DERMATOLOGY | Facility: CLINIC | Age: 68
End: 2018-03-30

## 2018-03-30 DIAGNOSIS — L23.9 ALLERGIC CONTACT DERMATITIS, UNSPECIFIED TRIGGER: Primary | ICD-10-CM

## 2018-03-30 RX ORDER — PREDNISONE 10 MG/1
10 TABLET ORAL DAILY
Qty: 7 TABLET | Refills: 0 | Status: SHIPPED | OUTPATIENT
Start: 2018-03-30

## 2018-03-30 NOTE — TELEPHONE ENCOUNTER
Received request for stronger dose of prednisone in the setting of dermatitis flare as the resident on call. Reviewed patient's chart and attached communication. Patient last seen 11/2017 for refractory ACD/hand dermatitis. RTC scheduled for 4/30/18. After reviewing the medication list and assessment and plan from last visit, the request was accepted.Will send prescription for higher dose of 10mg x 1 week (patient's previous dose), patient should then taper back to 8mg until her follow up appointment with Dr. Zuniga. Rx sent to [facility] pharmacy in [place].    Karlos Vega MD  Dermatology Resident, PGY2

## 2018-03-30 NOTE — TELEPHONE ENCOUNTER
"Kasey called today because she would like a \" strong\" dose of Prednisone. Kasey notes that he hands are really bad right now and she can barely tie her shoes. I explained that Kasey that I would send a message to our RYLEY and also CC Dr. Zuniga but it would be up to the RYLEY to decide since Dr. ESCAMILLA is out of the clinic until Monday.   "

## 2018-04-02 RX ORDER — PREDNISONE 5 MG/1
TABLET ORAL
Qty: 72 TABLET | Refills: 0 | Status: SHIPPED | OUTPATIENT
Start: 2018-04-02

## 2018-04-02 NOTE — TELEPHONE ENCOUNTER
Kasey called today because she is still having issues with her hands. Kasey would like a stronger dose of prednisone or at least be able to take more tablets in the day. Kasey notes that her hands hurt so bad she can't tie her shoes and she needs to go back to work soon. Kasey would like for me to send a message to Dr. Zuniga to review.

## 2018-04-30 ENCOUNTER — OFFICE VISIT (OUTPATIENT)
Dept: DERMATOLOGY | Facility: CLINIC | Age: 68
End: 2018-04-30
Payer: COMMERCIAL

## 2018-04-30 ENCOUNTER — TELEPHONE (OUTPATIENT)
Dept: DERMATOLOGY | Facility: CLINIC | Age: 68
End: 2018-04-30

## 2018-04-30 DIAGNOSIS — L30.9 FOOT DERMATITIS: ICD-10-CM

## 2018-04-30 DIAGNOSIS — L30.9 HAND DERMATITIS: Primary | ICD-10-CM

## 2018-04-30 DIAGNOSIS — B95.8 STAPHYLOCOCCAL INFECTION: ICD-10-CM

## 2018-04-30 RX ORDER — DOXYCYCLINE 100 MG/1
100 CAPSULE ORAL 2 TIMES DAILY
Qty: 28 CAPSULE | Refills: 0 | Status: SHIPPED | OUTPATIENT
Start: 2018-04-30 | End: 2018-08-27

## 2018-04-30 ASSESSMENT — PAIN SCALES - GENERAL: PAINLEVEL: EXTREME PAIN (8)

## 2018-04-30 NOTE — PATIENT INSTRUCTIONS
For you hands and feet:  Soak the hands and feet at bedtime with plain water.  Follow with Vaseline ointment and continue to cover with cotton gloves/socks.  We will start doxycyline 100 mg twice per day due to possibility of staph bacteria playing a role and for it's antiinflammatory benefits.  You may continue your Retinol cream and topical steroids (clobetasol, betamethasone dipropionate).    We will petition your insurance provider in hopes we can get dupilumab (Dupixent) covered for your dermatitis.

## 2018-04-30 NOTE — LETTER
4/30/2018       RE: Kasey Ibrahim  [address] [facility] [address]     Dear Colleague,    Thank you for referring your patient, Kasey Ibrahim, to the [facility] DERMATOLOGY at [facility]. Please see a copy of my visit note below.    [facility] Dermatology Note      Dermatology Problem List:  1. Suspected ACD hands and feet:  BX 4/2014 showed spongiotic dermatitis with eos on hands:  DDx ACD vs atopic dermatitis vs numular dermatitis   -TREATMENT FAILURES:  nb uvb, MMF, acitretin, methotrexate (all prior to presenting at the ), minocycline ( 8/2016-3/20/17), prednisone, clobetasol 0.05% ointment, CSA 3mg/kg/day  -Previous patch testing 8/2013 [place] [facility] [place]  2+ glutaraldehyde; mild: gold and Lillian soft moisturizing cream. Doubtful: fragrance mix 2, dodecyl gallate (both of these attributed to irritant reaction and not considered true allergens). Previous positives to bacitracin, bronopol, and mercapto mix were not reproducible; Dr. Quinonez recommended avoidance of bacitracin. Avoid leather.  - Cannot use dapsone due to sulfa allergy, cannot due UVA1 due to work schedule  - Previous petition for dupilumab not covered, will try again ~5/2018  - some improvement with prednisone   - Current rx: Vaseline under occlusion, betamethasone dipropionate ointment, retinol cream, course of doxycyline 100 mg BID for possible staph and antiinflammatory effects    Encounter Date: Apr 30, 2018    CC:  Chief Complaint   Patient presents with     Derm Problem     Kasey is here for a dermatitis follow up, states it's gotten worse since stopping prednisone.           History of Present Illness:  Ms. Kasey Ibrahim is a 67 year old female who presents as a follow-up for hand and foot dermatitis. The patient was last seen 11/02/2017 when she continued a course or prednsione, betamethasone cream, and retinol lotion. The patient completed her course of prednisone  approximately 1-2 weeks ago, and since then has started to flare. She has been continuing her topical regimen as above, but without significant improvement. The patient notes that she has developed some fissuring particularly on her hands, and wonders if there may be some staph playing a role. 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 She is otherwise feeling well with no additional skin concerns at this time.     Past Medical History:   Patient Active Problem List   Diagnosis     Cholangitis     Past Medical History:   Diagnosis Date     Depression      Hand dermatitis      Past Surgical History:   Procedure Laterality Date     FOOT SURGERY       PHACOEMULSIFICATION WITH STANDARD INTRAOCULAR LENS IMPLANT Left 7/3/2017    Procedure: PHACOEMULSIFICATION WITH STANDARD INTRAOCULAR LENS IMPLANT;  Left cataract removal with implant;  Surgeon: Dominguez Payne MD;  Location: [facility] OR     PHACOEMULSIFICATION WITH STANDARD INTRAOCULAR LENS IMPLANT Right 7/24/2017    Procedure: PHACOEMULSIFICATION WITH STANDARD INTRAOCULAR LENS IMPLANT;  Right cataract removal with implant;  Surgeon: Dominguez Payne MD;  Location: [place] OR       Social History:  Patient works at [employer].    Family History:  Not assessed.    Medications:  Current Outpatient Prescriptions   Medication Sig Dispense Refill     augmented betamethasone dipropionate (DIPROLENE-AF) 0.05 % ointment Apply to affected areas of hand twice daily, everyday. Use Vaniply on top of this medication. Occlude with gloves at night. 50 g 3     BuPROPion HCl (WELLBUTRIN PO)        Calcium carb-Vitamin D 500 mg [place]-200 units (OSCAL WITH D;OYSTER SHELL CALCIUM) 500-200 MG-UNIT per tablet Take 1 tablet by mouth daily       clobetasol (TEMOVATE) 0.05 % ointment Apply thin layer twice daily to hands and feet.  Soak hands before application at bedtime. 60 g 11     FLUOXETINE HCL  PO        methocarbamol (ROBAXIN) 500 MG tablet Take 500 mg by mouth       naproxen (NAPROSYN) 500 MG tablet Take 500 mg by mouth       predniSONE (DELTASONE) 1 MG tablet Take 8 tablets (8 mg) by mouth daily (Patient not taking: Reported on 4/30/2018) 240 tablet 3     predniSONE (DELTASONE) 10 MG tablet Take 40mg [id number] then [id number],, then 20mg qday until seen. (Patient not taking: Reported on 4/30/2018) 80 tablet 0     predniSONE (DELTASONE) 10 MG tablet Take 1 tablet (10 mg) by mouth daily (Patient not taking: Reported on 4/30/2018) 7 tablet 0     predniSONE (DELTASONE) 5 MG tablet Take 1 tablet (5 mg) by mouth daily (Patient not taking: Reported on 4/30/2018) 30 tablet 3     predniSONE (DELTASONE) 5 MG tablet 8,8,7,7,6,6,5,5,4,4,3,3,2,2,1,1, (Patient not taking: Reported on 4/30/2018) 72 tablet 0     Allergies   Allergen Reactions     Sulfadiazine      Other reaction(s): Hives/ Urticaria - Allergy     Tramadol      Other reaction(s): Shortness Of Breath/Wheezing/Chest Tightness - Allergy     Atorvastatin      Other reaction(s): Rash - Allergy     Bronopol Other (See Comments)     Previous Positive (+) skin patch test     Bacitracin Rash     Patch testing     Glutaraldehyde Rash     2+ patch testing     Gold Rash     Mild patch test reaction     Insulin Detemir      Other reaction(s): Headaches/ Migraines - Intolerance     Lisinopril      Other reaction(s): Headaches/ Migraines - Intolerance     Penicillins Rash     Sulfa Drugs Rash         Review of Systems:  -As per HPI  -Constitutional: The patient denies fatigue, fevers, chills, unintended weight loss, and night sweats.  -HEENT: Patient denies nonhealing oral sores.  -Skin: As above in HPI. No additional skin concerns.    Physical exam:  Vitals: There were no vitals taken for this visit.  GEN: This is a well developed, well-nourished female in no acute distress, in a pleasant mood.    SKIN: Focused examination of the arms, hands, and feet was  performed.  -Over the bilateral palmar hands and plantar feet, there are fairly well marginated pink, moderately keratotic, scaly, and frequently fissured thin plaques. These are most notable on the bilateral palms.   -No other lesions of concern on areas examined.     Impression/Plan:  1. Eczematous dermatitis/ACD: Unfortunately, the patient has had recalcitrant dermatitis despite avoidance of patch test positive products and aggressive topical and systemic therapies. In the past, we have attempted to start dupilumab as she has failed many other systemic treatments (prednisone, methotrexate, mycophenolate mofetil, acitretin, and nbUVB) but her insurance provider has denied coverage. We will again petition her insurance provider in hopes of getting this covered as it is likely her best treatment option. In the interim, she was encouraged to continue with her aggressive topical therapies. As her plaques are fissured, we were suspicious of staph playing a role. As such, doxycyline was prescribed both for staph coverage and anti-inflammatory benefits.    Retrial of dupilumab coverage    Begin doxycyline 100 mg BID for 2 weeks    Continue to use betamethasone and retinol cream    Will plan on visit with Dr. Aranda at follow up      Follow-up in 6-8 weeks, earlier for new or changing lesions.     Staff Involved:  Patient seen and discussed with Dr. Zuniga.    Narendra Gan MD  Dermatology resident, PGY-4  [phone]   .I, Randee Zuniga MD, saw this patient with the resident and agree with the resident s findings and plan of care as documented in the resident s note.

## 2018-04-30 NOTE — MR AVS SNAPSHOT
After Visit Summary   4/30/2018    Kasey Ibrahim    MRN: [id number]           Patient Information     Date Of Birth          1950        Visit Information        Provider Department      4/30/2018 8:45 AM Randee Zuniga MD [facility]        Today's Diagnoses     Hand dermatitis    -  1    Foot dermatitis        Staphylococcal infection          Care Instructions    For you hands and feet:  Soak the hands and feet at bedtime with plain water.  Follow with Vaseline ointment and continue to cover with cotton gloves/socks.  We will start doxycyline 100 mg twice per day due to possibility of staph bacteria playing a role and for it's antiinflammatory benefits.  You may continue your Retinol cream and topical steroids (clobetasol, betamethasone dipropionate).    We will petition your insurance provider in hopes we can get dupilumab (Dupixent) covered for your dermatitis.          Follow-ups after your visit        Follow-up notes from your care team     Return in about 6 weeks (around 6/11/2018) for Routine Visit.      Your next 10 appointments already scheduled     Jun 11, 2018 11:00 AM CDT   (Arrive by 10:45 AM)   Return Visit with Lester Aranda MD   [facility] ([facility] [place])    [address] [place] [id number] [phone]              Who to contact     Please call your clinic at [phone] to:    Ask questions about your health    Make or cancel appointments    Discuss your medicines    Learn about your test results    Speak to your doctor            Additional Information About Your Visit        MyChart Information     [employer] gives you secure access to your electronic health record. If you see a primary care provider, you can also send messages to your care team and make appointments. If you have questions, please call your primary care clinic.  If you do not have a primary care provider, please call  [phone] and they will assist you.      [employer] is an electronic gateway that provides easy, online access to your medical records. With [employer], you can request a clinic appointment, read your test results, renew a prescription or communicate with your care team.     To access your existing account, please contact your [facility] or call [phone] for assistance.        Care EveryWhere ID     This is your Care EveryWhere ID. This could be used by other organizations to access your [place] medical records  [id number]         Blood Pressure from Last 3 Encounters:   07/24/17 114/63   07/03/17 115/59   04/17/17 131/88    Weight from Last 3 Encounters:   07/24/17 78.9 kg (174 lb)   07/03/17 77.1 kg (170 lb)   03/20/17 80.6 kg (177 lb 12.8 oz)              Today, you had the following     No orders found for display       Primary Care Provider Office Phone # Fax #    Ankit Dominguez Martini [phone]       [facility] [address] [facility] [address]        Equal Access to Services     [facility] HospitalRAF[facility] : Hadii manuela Chiu, waaxda dwayne, qaybta love sultana, rut dc . So [facility] [phone].    ATENCIÓN: Si habla español, tiene a young disposición servicios gratuitos de asistencia lingüística. [facility] [phone].    We comply with applicable federal civil rights laws and [place] laws. We do not discriminate on the basis of race, color, national origin, age, disability, sex, sexual orientation, or gender identity.            Thank you!     Thank you for choosing [facility]  for your care. Our goal is always to provide you with excellent care. Hearing back from our patients is one way we can continue to improve our services. Please take a few minutes to complete the written survey that you may receive in the mail after your visit with us. Thank you!             Your Updated Medication List -  Protect others around you: Learn how to safely use, store and throw away your medicines at www.disposemymeds.org.          This list is accurate as of 4/30/18  9:17 AM.  Always use your most recent med list.                   Brand Name Dispense Instructions for use Diagnosis    augmented betamethasone dipropionate 0.05 % ointment    DIPROLENE-AF    50 g    Apply to affected areas of hand twice daily, everyday. Use Vaniply on top of this medication. Occlude with gloves at night.    Allergic contact dermatitis due to drugs in contact with skin       Calcium carb-Vitamin D 500 mg K[place]-200 units 500-200 MG-UNIT per tablet    OSCAL with D;Oyster Shell Calcium     Take 1 tablet by mouth daily        clobetasol 0.05 % ointment    TEMOVATE    60 g    Apply thin layer twice daily to hands and feet.  Soak hands before application at bedtime.    Allergic contact dermatitis, unspecified trigger, Chronic dermatitis of hands       FLUOXETINE HCL PO           methocarbamol 500 MG tablet    ROBAXIN     Take 500 mg by mouth        naproxen 500 MG tablet    NAPROSYN     Take 500 mg by mouth        * predniSONE 10 MG tablet    DELTASONE    80 tablet    Take 40mg [id number] then [id number],, then 20mg qday until seen.    Chronic dermatitis of hands, Allergic contact dermatitis, unspecified trigger       * predniSONE 5 MG tablet    DELTASONE    30 tablet    Take 1 tablet (5 mg) by mouth daily    Allergic contact dermatitis, unspecified trigger       * predniSONE 1 MG tablet    DELTASONE    240 tablet    Take 8 tablets (8 mg) by mouth daily    Allergic contact dermatitis due to drugs in contact with skin       * predniSONE 10 MG tablet    DELTASONE    7 tablet    Take 1 tablet (10 mg) by mouth daily    Allergic contact dermatitis, unspecified trigger       * predniSONE 5 MG tablet    DELTASONE    72 tablet    8,8,7,7,6,6,5,5,4,4,3,3,2,2,1,1,    Allergic contact dermatitis, unspecified trigger       WELLBUTRIN PO           * Notice:   This list has 5 medication(s) that are the same as other medications prescribed for you. Read the directions carefully, and ask your doctor or other care provider to review them with you.

## 2018-04-30 NOTE — NURSING NOTE
Dermatology Rooming Note    Kasey Ibrahim's goals for this visit include:   Chief Complaint   Patient presents with     Derm Problem     Kasey is here for a dermatitis follow up, states it's gotten worse since stopping prednisone.         Shea Ding LPN

## 2018-04-30 NOTE — PROGRESS NOTES
[facility] Dermatology Note      Dermatology Problem List:  1. Suspected ACD hands and feet:  BX 4/2014 showed spongiotic dermatitis with eos on hands:  DDx ACD vs atopic dermatitis vs numular dermatitis   -TREATMENT FAILURES:  nb uvb, MMF, acitretin, methotrexate (all prior to presenting at the ), minocycline ( 8/2016-3/20/17), prednisone, clobetasol 0.05% ointment, CSA 3mg/kg/day  -Previous patch testing 8/2013 at [facility]:  2+ glutaraldehyde; mild: gold and Lillian soft moisturizing cream. Doubtful: fragrance mix 2, dodecyl gallate (both of these attributed to irritant reaction and not considered true allergens). Previous positives to bacitracin, bronopol, and mercapto mix were not reproducible; Dr. Quinonez recommended avoidance of bacitracin. Avoid leather.  - Cannot use dapsone due to sulfa allergy, cannot due [employer] due to work schedule  - Previous petition for dupilumab not covered, will try again ~5/2018  - some improvement with prednisone   - Current rx: Vaseline under occlusion, betamethasone dipropionate ointment, retinol cream, course of doxycyline 100 mg BID for possible staph and antiinflammatory effects    Encounter Date: Apr 30, 2018    CC:  Chief Complaint   Patient presents with     Derm Problem     Kasey is here for a dermatitis follow up, states it's gotten worse since stopping prednisone.           History of Present Illness:  Ms. Kasey Ibrahim is a 67 year old female who presents as a follow-up for hand and foot dermatitis. The patient was last seen 11/02/2017 when she continued a course or prednsione, betamethasone cream, and retinol lotion. The patient completed her course of prednisone approximately 1-2 weeks ago, and since then has started to flare. She has been continuing her topical regimen as above, but without significant improvement. The patient notes that she has developed some fissuring particularly on her hands, and wonders if there may be some staph playing a role.  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 She is otherwise feeling well with no additional skin concerns at this time.     Past Medical History:   Patient Active Problem List   Diagnosis     Cholangitis     Past Medical History:   Diagnosis Date     Depression      Hand dermatitis      Past Surgical History:   Procedure Laterality Date     FOOT SURGERY       PHACOEMULSIFICATION WITH STANDARD INTRAOCULAR LENS IMPLANT Left 7/3/2017    Procedure: PHACOEMULSIFICATION WITH STANDARD INTRAOCULAR LENS IMPLANT;  Left cataract removal with implant;  Surgeon: Dominguez Payne MD;  Location: [facility] OR     PHACOEMULSIFICATION WITH STANDARD INTRAOCULAR LENS IMPLANT Right 7/24/2017    Procedure: PHACOEMULSIFICATION WITH STANDARD INTRAOCULAR LENS IMPLANT;  Right cataract removal with implant;  Surgeon: Dominguez Payne MD;  Location: [facility] OR       Social History:  Patient works at [employer].    Family History:  Not assessed.    Medications:  Current Outpatient Prescriptions   Medication Sig Dispense Refill     augmented betamethasone dipropionate (DIPROLENE-AF) 0.05 % ointment Apply to affected areas of hand twice daily, everyday. Use Vaniply on top of this medication. Occlude with gloves at night. 50 g 3     BuPROPion HCl (WELLBUTRIN PO)        Calcium carb-Vitamin D 500 mg [place]-200 units (OSCAL WITH D;OYSTER SHELL CALCIUM) 500-200 MG-UNIT per tablet Take 1 tablet by mouth daily       clobetasol (TEMOVATE) 0.05 % ointment Apply thin layer twice daily to hands and feet.  Soak hands before application at bedtime. 60 g 11     FLUOXETINE HCL PO        methocarbamol (ROBAXIN) 500 MG tablet Take 500 mg by mouth       naproxen (NAPROSYN) 500 MG tablet Take 500 mg by mouth       predniSONE (DELTASONE) 1 MG tablet Take 8 tablets (8 mg) by mouth daily (Patient not taking: Reported on 4/30/2018) 240 tablet 3     predniSONE (DELTASONE) 10  MG tablet Take 40mg [id number] then [id number],, then 20mg qday until seen. (Patient not taking: Reported on 4/30/2018) 80 tablet 0     predniSONE (DELTASONE) 10 MG tablet Take 1 tablet (10 mg) by mouth daily (Patient not taking: Reported on 4/30/2018) 7 tablet 0     predniSONE (DELTASONE) 5 MG tablet Take 1 tablet (5 mg) by mouth daily (Patient not taking: Reported on 4/30/2018) 30 tablet 3     predniSONE (DELTASONE) 5 MG tablet 8,8,7,7,6,6,5,5,4,4,3,3,2,2,1,1, (Patient not taking: Reported on 4/30/2018) 72 tablet 0     Allergies   Allergen Reactions     Sulfadiazine      Other reaction(s): Hives/ Urticaria - Allergy     Tramadol      Other reaction(s): Shortness Of Breath/Wheezing/Chest Tightness - Allergy     Atorvastatin      Other reaction(s): Rash - Allergy     Bronopol Other (See Comments)     Previous Positive (+) skin patch test     Bacitracin Rash     Patch testing     Glutaraldehyde Rash     2+ patch testing     Gold Rash     Mild patch test reaction     Insulin Detemir      Other reaction(s): Headaches/ Migraines - Intolerance     Lisinopril      Other reaction(s): Headaches/ Migraines - Intolerance     Penicillins Rash     Sulfa Drugs Rash         Review of Systems:  -As per HPI  -Constitutional: The patient denies fatigue, fevers, chills, unintended weight loss, and night sweats.  -HEENT: Patient denies nonhealing oral sores.  -Skin: As above in HPI. No additional skin concerns.    Physical exam:  Vitals: There were no vitals taken for this visit.  GEN: This is a well developed, well-nourished female in no acute distress, in a pleasant mood.    SKIN: Focused examination of the arms, hands, and feet was performed.  -Over the bilateral palmar hands and plantar feet, there are fairly well marginated pink, moderately keratotic, scaly, and frequently fissured thin plaques. These are most notable on the bilateral palms.   -No other lesions of concern on areas examined.     Impression/Plan:  1. Eczematous  dermatitis/ACD: Unfortunately, the patient has had recalcitrant dermatitis despite avoidance of patch test positive products and aggressive topical and systemic therapies. In the past, we have attempted to start dupilumab as she has failed many other systemic treatments (prednisone, methotrexate, mycophenolate mofetil, acitretin, and nbUVB) but her insurance provider has denied coverage. We will again petition her insurance provider in hopes of getting this covered as it is likely her best treatment option. In the interim, she was encouraged to continue with her aggressive topical therapies. As her plaques are fissured, we were suspicious of staph playing a role. As such, doxycyline was prescribed both for staph coverage and anti-inflammatory benefits.    Retrial of dupilumab coverage    Begin doxycyline 100 mg BID for 2 weeks    Continue to use betamethasone and retinol cream    Will plan on visit with Dr. Aranda at follow up      Follow-up in 6-8 weeks, earlier for new or changing lesions.     Staff Involved:  Patient seen and discussed with Dr. Zuniga.    Narendra Gan MD  Dermatology resident, PGY-4  [phone]   .I, Randee Zuniga MD, saw this patient with the resident and agree with the resident s findings and plan of care as documented in the resident s note.

## 2018-05-01 NOTE — TELEPHONE ENCOUNTER
Prior Authorization Approval    Authorization Effective Date: 1/1/2018  Authorization Expiration Date: 5/1/2019  Medication: dupilumab (Dupixent) 300mg/ 2mL syringes   Approved Dose/Quantity:   Reference #: CMM key# [id number]   Insurance Company: [employer] Platinum Blue - Phone [phone] Fax [phone]  Expected CoPay: $2073 1st month     CoPay Card Available: No   Foundation Assistance Needed:  Yes but no hardship assistance program for this med and no open grants currently  Which Pharmacy is filling the prescription (Not needed for infusion/clinic administered): [facility] - [place], [place] - [address]  Pharmacy Notified:    Patient Notified: Yes

## 2018-05-01 NOTE — TELEPHONE ENCOUNTER
PA Initiation    Medication: dupilumab (Dupixent) 300mg/ 2mL syringes  Insurance Company: [employer] Pl[employer] Blue - Phone [phone] Fax [phone]  Pharmacy Filling the Rx: [facility] - [place], [place] - [address]  Filling Pharmacy Phone: [phone]  Filling Pharmacy Fax:    Start Date: 5/1/2018    ** Previous approval/ start of Dupixent in 2017 but pt went onto Medicare shortly thereafter and cost became unmanageable ($1675 for 1 month) to continue therapy; met with pt in clinic at [facility] to discuss this and continued lack of hardship assistance program for Medicare pts; pt referred to Sr Linkage Line ph#; unable to see cost without pursuing PA so also completing that.

## 2018-05-24 ENCOUNTER — TELEPHONE (OUTPATIENT)
Dept: DERMATOLOGY | Facility: CLINIC | Age: 68
End: 2018-05-24

## 2018-05-24 DIAGNOSIS — L30.9 CHRONIC DERMATITIS OF HANDS: Primary | ICD-10-CM

## 2018-05-24 NOTE — TELEPHONE ENCOUNTER
[facility] Call Center    Phone Message    May a detailed message be left on voicemail: yes    Reason for Call: Other: Pt of Dr. Zuniga called, and stated that the pain is very great in her hands, and is wondering if she could get an Rx for Prednisone. Pt uses [facility] - [address]     Action Taken: Message routed to:  [facility] ([facility] Derm

## 2018-05-25 RX ORDER — METHYLPREDNISOLONE 4 MG
TABLET, DOSE PACK ORAL
Qty: 21 TABLET | Refills: 0 | Status: SHIPPED | OUTPATIENT
Start: 2018-05-25

## 2018-05-25 NOTE — TELEPHONE ENCOUNTER
Last Visit:04/30/18  Next Visit: 06/11/18  Impression/Plan:  1. Eczematous dermatitis/ACD: Unfortunately, the patient has had recalcitrant dermatitis despite avoidance of patch test positive products and aggressive topical and systemic therapies. In the past, we have attempted to start dupilumab as she has failed many other systemic treatments (prednisone, methotrexate, mycophenolate mofetil, acitretin, and nbUVB) but her insurance provider has denied coverage. We will again petition her insurance provider in hopes of getting this covered as it is likely her best treatment option. In the interim, she was encouraged to continue with her aggressive topical therapies. As her plaques are fissured, we were suspicious of staph playing a role. As such, doxycyline was prescribed both for staph coverage and anti-inflammatory benefits.    Retrial of dupilumab coverage    Begin doxycyline 100 mg BID for 2 weeks    Continue to use betamethasone and retinol cream    Will plan on visit with Dr. Aranda at follow up        Follow-up in 6-8 weeks, earlier for new or changing lesions.

## 2018-06-11 ENCOUNTER — OFFICE VISIT (OUTPATIENT)
Dept: DERMATOLOGY | Facility: CLINIC | Age: 68
End: 2018-06-11
Payer: COMMERCIAL

## 2018-06-11 DIAGNOSIS — L30.8 OTHER ECZEMA: Primary | ICD-10-CM

## 2018-06-11 ASSESSMENT — ACTIVITIES OF DAILY LIVING (ADL)
DRESS: 0-->INDEPENDENT
EATING: 0 - INDEPENDENT
BATHING: 0 - INDEPENDENT
RETIRED_EATING: 0-->INDEPENDENT
CHANGE_IN_FUNCTIONAL_STATUS_SINCE_ONSET_OF_CURRENT_ILLNESS/INJURY: NO
SWALLOWING: 0 - SWALLOWS FOODS/LIQUIDS WITHOUT DIFFICULTY
TOILETING: 0-->INDEPENDENT
TRANSFERRING: 0-->INDEPENDENT
FALL_HISTORY_WITHIN_LAST_SIX_MONTHS: NO
TOILETING: 0 - INDEPENDENT
SWALLOWING: 0-->SWALLOWS FOODS/LIQUIDS WITHOUT DIFFICULTY
RETIRED_COMMUNICATION: 0-->UNDERSTANDS/COMMUNICATES WITHOUT DIFFICULTY
AMBULATION: 0 - INDEPENDENT
COMMUNICATION: 0 - UNDERSTANDS/COMMUNICATES WITHOUT DIFFICULTY
COGNITION: 0 - NO COGNITION ISSUES REPORTED
TRANSFERRING: 0 - INDEPENDENT
BATHING: 0-->INDEPENDENT
DRESS: 0 - INDEPENDENT
AMBULATION: 0-->INDEPENDENT

## 2018-06-11 ASSESSMENT — PAIN SCALES - GENERAL: PAINLEVEL: EXTREME PAIN (8)

## 2018-06-11 NOTE — LETTER
6/11/2018       RE: Kasey Ibrahim  [address] [facility] [address]     Dear Colleague,    Thank you for referring your patient, Kasey Ibrahim, to the [facility] at [facility]. Please see a copy of my visit note below.    [facility] Dermatology Note      Dermatology Problem List:  1.Chronic hand and foot eczema 2/2 toxic irritant vs allergic vs atopic psoriaform    Encounter Date: Jun 11, 2018    CC:   Chief Complaint   Patient presents with     Derm Problem     Kasey is here for a follow up regarding her dermatitis she states that the medication helped but it flared back up once medication was stopped.          History of Present Illness:  Ms. Kasey Ibrahim is a 68 year old female who presents with chronic hand and foot eczema for past 4.5 years.  She has tried and failed: nb uvb, MMF, acitretin, methotrexate (all prior to presenting at the ), minocycline ( 8/2016-3/20/17), prednisone, clobetasol 0.05% ointment, CSA 3mg/kg/day. She just finished a medrol dose pack, which helped.  She had previous allergy patch test in 2013 where she was only found to have +1 to gold and bacitracin, in other words, negative patch test. (See [facility] records from [facility]).  No personal or family hx of psoriasis or hay fever.  She uses T gel for posterior scalp itching, which helps.      Past Medical History:   Patient Active Problem List   Diagnosis     Cholangitis     Past Medical History:   Diagnosis Date     Depression      Hand dermatitis      Past Surgical History:   Procedure Laterality Date     FOOT SURGERY       PHACOEMULSIFICATION WITH STANDARD INTRAOCULAR LENS IMPLANT Left 7/3/2017    Procedure: PHACOEMULSIFICATION WITH STANDARD INTRAOCULAR LENS IMPLANT;  Left cataract removal with implant;  Surgeon: Domingeuz Payne MD;  Location: [facility] OR     PHACOEMULSIFICATION WITH STANDARD INTRAOCULAR LENS IMPLANT Right 7/24/2017     Procedure: PHACOEMULSIFICATION WITH STANDARD INTRAOCULAR LENS IMPLANT;  Right cataract removal with implant;  Surgeon: Dominguez Payne MD;  Location: [facility] OR       Social History:  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     Family History:  No family hx of atopic dermatitis, hay fever, psoriasis    Medications:  Current Outpatient Prescriptions   Medication Sig Dispense Refill     augmented betamethasone dipropionate (DIPROLENE-AF) 0.05 % ointment Apply to affected areas of hand twice daily, everyday. Use Vaniply on top of this medication. Occlude with gloves at night. 50 g 3     BuPROPion HCl (WELLBUTRIN PO)        Calcium carb-Vitamin D 500 mg [place]-200 units (OSCAL WITH D;OYSTER SHELL CALCIUM) 500-200 MG-UNIT per tablet Take 1 tablet by mouth daily       clobetasol (TEMOVATE) 0.05 % ointment Apply thin layer twice daily to hands and feet.  Soak hands before application at bedtime. 60 g 11     doxycycline (VIBRAMYCIN) 100 MG capsule Take 1 capsule (100 mg) by mouth 2 times daily 28 capsule 0     Dupilumab (DUPIXENT) 300 MG/2ML syringe Inject 2 mLs (300 mg) Subcutaneous every 14 days 2 mL 4     FLUOXETINE HCL PO        methocarbamol (ROBAXIN) 500 MG tablet Take 500 mg by mouth       methylPREDNISolone (MEDROL DOSEPAK) 4 MG tablet Follow package instructions 21 tablet 0     naproxen (NAPROSYN) 500 MG tablet Take 500 mg by mouth       predniSONE (DELTASONE) 1 MG tablet Take 8 tablets (8 mg) by mouth daily (Patient not taking: Reported on 4/30/2018) 240 tablet 3     predniSONE (DELTASONE) 10 MG tablet Take 1 tablet (10 mg) by mouth daily (Patient not taking: Reported on 4/30/2018) 7 tablet 0     predniSONE (DELTASONE) 10 MG tablet Take 40mg [id number] then [id number],, then 20mg qday until seen. (Patient not taking: Reported on 4/30/2018) 80 tablet 0     predniSONE (DELTASONE) 5 MG tablet 8,8,7,7,6,6,5,5,4,4,3,3,2,2,1,1, (Patient not taking: Reported on 4/30/2018) 72 tablet 0     predniSONE (DELTASONE)  5 MG tablet Take 1 tablet (5 mg) by mouth daily (Patient not taking: Reported on 4/30/2018) 30 tablet 3        Allergies   Allergen Reactions     Sulfadiazine      Other reaction(s): Hives/ Urticaria - Allergy     Tramadol      Other reaction(s): Shortness Of Breath/Wheezing/Chest Tightness - Allergy     Atorvastatin      Other reaction(s): Rash - Allergy     Bronopol Other (See Comments)     Previous Positive (+) skin patch test     Bacitracin Rash     Patch testing     Glutaraldehyde Rash     2+ patch testing     Gold Rash     Mild patch test reaction     Insulin Detemir      Other reaction(s): Headaches/ Migraines - Intolerance     Lisinopril      Other reaction(s): Headaches/ Migraines - Intolerance     Penicillins Rash     Sulfa Drugs Rash         Review of Systems:  -As per HPI  -Constitutional: The patient denies fatigue, fevers, chills, unintended weight loss, and night sweats.  -HEENT: Patient denies nonhealing oral sores.  -Skin: As above in HPI. No additional skin concerns.    Physical exam:  Vitals: There were no vitals taken for this visit.  GEN: This is a well developed, well-nourished female in no acute distress, in a pleasant mood.    SKIN: Focused examination of the palms and soles was performed.  -Hyperkeratosis of palms and soles of feet, some small fissuring, minimal erythema, tender to palpation near fissures, as expected.  -No other lesions of concern on areas examined.     Impression/Plan:  1. Chronic hand and foot eczema 2/2 toxic irritant vs allergic vs atopic psoriaform    Will arrange for repeat patch test in 3-4 weeks, given recent oral steroid use: standard + emollient + preservative series    Will need to confirm if insurance will cover    10% OTC urea cream to palms and soles of feet    Dupilumab is not covered by her insurance    If allergic component cannot be found, radiation therapy will be the next option    Fungal cultures obtained     Patient also was seen and discussed with   Efrain to confirm the above plan     staffed the patient.    Staff Physician Comments:  I saw and evaluated the patient with the resident and I agree with the assessment and plan. I was present for the examination and discussion of diagnosis and diagnostic procedures and possible triggers. We even discussed the patient in presence of Dr. Yaima Zuniga.      Staff Involved:  Resident(Christopher Altman)/Staff(as above)    Christopher Altman PGY-3  Plastic Surgery    Again, thank you for allowing me to participate in the care of your patient.      Sincerely,    Lester Aranda MD

## 2018-06-11 NOTE — PATIENT INSTRUCTIONS
Use over the counter 10-20% urea cream to areas of dermatitis--palms and soles of feet    We will arrange for patch test in 3-4 weeks, no oral steroids until then      Checking on insurance for patch testing.

## 2018-06-11 NOTE — MR AVS SNAPSHOT
After Visit Summary   6/11/2018    Kasey Ibrahim    MRN: [id number]           Patient Information     Date Of Birth          1950        Visit Information        Provider Department      6/11/2018 11:00 AM Lester Aranda MD [facility] D[facility]        Care Instructions    Use over the counter 10-20% urea cream to areas of dermatitis--palms and soles of feet    We will arrange for patch test in 3-4 weeks, no oral steroids until then      Checking on insurance for patch testing.           Follow-ups after your visit        Your next 10 appointments already scheduled     Jul 23, 2018  9:00 AM CDT   (Arrive by 8:45 AM)   New Allergy with Lester Aranda MD   [facility] ([facility])    [address] [place] [id number] [phone]            Jul 25, 2018  2:45 PM CDT   (Arrive by 2:30 PM)   Return Allergy with Lester Aranda MD   [facility] ([facility])    [address] [place] [address] [id number] [phone]            Jul 27, 2018  2:30 PM CDT   (Arrive by 2:15 PM)   Return Allergy with Lester Aranda MD   [facility] ([facility])    [address] [place] [id number] [phone]              Who to contact     Please call your clinic at [phone] to:    Ask questions about your health    Make or cancel appointments    Discuss your medicines    Learn about your test results    Speak to your doctor            Additional Information About Your Visit        [employer] Information     [employer] gives you secure access to your electronic health record. If you see a primary care provider, you can also send messages to your care team and make appointments. If you have questions, please call your primary care clinic.  If you do not have a primary care provider, please call [phone] and they will assist you.       [employer] is an electronic gateway that provides easy, online access to your medical records. With [employer], you can request a clinic appointment, read your test results, renew a prescription or communicate with your care team.     To access your existing account, please contact your [facility] or call [phone] for assistance.        Care EveryWhere ID     This is your Care EveryWhere ID. This could be used by other organizations to access your [facility] medical records  [id number]         Blood Pressure from Last 3 Encounters:   07/24/17 114/63   07/03/17 115/59   04/17/17 131/88    Weight from Last 3 Encounters:   07/24/17 78.9 kg (174 lb)   07/03/17 77.1 kg (170 lb)   03/20/17 80.6 kg (177 lb 12.8 oz)              Today, you had the following     No orders found for display       Primary Care Provider Office Phone # Fax #    Ankit Dominguez Martini [phone]       [facility] [address] [facility] [address] [address]        Equal Access to Services     [facility]: Hadii aad ku hadasho Soomaali, waaxda luqadaha, qaybta kaalmada adeegyada, rut dc . So [facility] [phone].    ATENCIÓN: Si habla español, tiene a young disposición servicios gratuitos de asistencia lingüística. Llame al [phone].    We comply with applicable federal civil rights laws and [place] laws. We do not discriminate on the basis of race, color, national origin, age, disability, sex, sexual orientation, or gender identity.            Thank you!     Thank you for choosing [facility] DERMAT[facility]  for your care. Our goal is always to provide you with excellent care. Hearing back from our patients is one way we can continue to improve our services. Please take a few minutes to complete the written survey that you may receive in the mail after your visit with us. Thank you!             Your Updated Medication List - Protect others around you: Learn how to  safely use, store and throw away your medicines at www.disposemymeds.org.          This list is accurate as of 6/11/18 11:31 AM.  Always use your most recent med list.                   Brand Name Dispense Instructions for use Diagnosis    augmented betamethasone dipropionate 0.05 % ointment    DIPROLENE-AF    50 g    Apply to affected areas of hand twice daily, everyday. Use Vaniply on top of this medication. Occlude with gloves at night.    Allergic contact dermatitis due to drugs in contact with skin       Calcium carb-Vitamin D 500 mg Jamul-200 units 500-200 MG-UNIT per tablet    OSCAL with D;Oyster Shell Calcium     Take 1 tablet by mouth daily        clobetasol 0.05 % ointment    TEMOVATE    60 g    Apply thin layer twice daily to hands and feet.  Soak hands before application at bedtime.    Allergic contact dermatitis, unspecified trigger, Chronic dermatitis of hands       doxycycline 100 MG capsule    VIBRAMYCIN    28 capsule    Take 1 capsule (100 mg) by mouth 2 times daily    Staphylococcal infection, Foot dermatitis, Hand dermatitis       Dupilumab 300 MG/2ML syringe    DUPIXENT    2 mL    Inject 2 mLs (300 mg) Subcutaneous every 14 days    Hand dermatitis, Foot dermatitis       FLUOXETINE HCL PO           methocarbamol 500 MG tablet    ROBAXIN     Take 500 mg by mouth        methylPREDNISolone 4 MG tablet    MEDROL DOSEPAK    21 tablet    Follow package instructions    Chronic dermatitis of hands       naproxen 500 MG tablet    NAPROSYN     Take 500 mg by mouth        * predniSONE 10 MG tablet    DELTASONE    80 tablet    Take 40mg [id number] then [id number],, then 20mg qday until seen.    Chronic dermatitis of hands, Allergic contact dermatitis, unspecified trigger       * predniSONE 5 MG tablet    DELTASONE    30 tablet    Take 1 tablet (5 mg) by mouth daily    Allergic contact dermatitis, unspecified trigger       * predniSONE 1 MG tablet    DELTASONE    240 tablet    Take 8 tablets (8 mg) by mouth  daily    Allergic contact dermatitis due to drugs in contact with skin       * predniSONE 10 MG tablet    DELTASONE    7 tablet    Take 1 tablet (10 mg) by mouth daily    Allergic contact dermatitis, unspecified trigger       * predniSONE 5 MG tablet    DELTASONE    72 tablet    8,8,7,7,6,6,5,5,4,4,3,3,2,2,1,1,    Allergic contact dermatitis, unspecified trigger       WELLBUTRIN PO           * Notice:  This list has 5 medication(s) that are the same as other medications prescribed for you. Read the directions carefully, and ask your doctor or other care provider to review them with you.

## 2018-06-11 NOTE — PROGRESS NOTES
[facility] Dermatology Note      Dermatology Problem List:  1.Chronic hand and foot eczema 2/2 toxic irritant vs allergic vs atopic psoriaform    Encounter Date: Jun 11, 2018    CC:   Chief Complaint   Patient presents with     Derm Problem     Kasey is here for a follow up regarding her dermatitis she states that the medication helped but it flared back up once medication was stopped.          History of Present Illness:  Ms. Kasey Ibrahim is a 68 year old female who presents with chronic hand and foot eczema for past 4.5 years.  She has tried and failed: nb uvb, MMF, acitretin, methotrexate (all prior to presenting at the ), minocycline ( 8/2016-3/20/17), prednisone, clobetasol 0.05% ointment, CSA 3mg/kg/day. She just finished a medrol dose pack, which helped.  She had previous allergy patch test in 2013 where she was only found to have +1 to gold and bacitracin, in other words, negative patch test. (See [facility] from [facility]).  No personal or family hx of psoriasis or hay fever.  She uses T gel for posterior scalp itching, which helps.      Past Medical History:   Patient Active Problem List   Diagnosis     Cholangitis     Past Medical History:   Diagnosis Date     Depression      Hand dermatitis      Past Surgical History:   Procedure Laterality Date     FOOT SURGERY       PHACOEMULSIFICATION WITH STANDARD INTRAOCULAR LENS IMPLANT Left 7/3/2017    Procedure: PHACOEMULSIFICATION WITH STANDARD INTRAOCULAR LENS IMPLANT;  Left cataract removal with implant;  Surgeon: Dominguez Payne MD;  Location: [facility] OR     PHACOEMULSIFICATION WITH STANDARD INTRAOCULAR LENS IMPLANT Right 7/24/2017    Procedure: PHACOEMULSIFICATION WITH STANDARD INTRAOCULAR LENS IMPLANT;  Right cataract removal with implant;  Surgeon: Dominguez Payne MD;  Location: [facility] OR       Social History:  The patient works for [employer] as a [employer].     Family History:  No family hx of atopic dermatitis, hay  fever, psoriasis    Medications:  Current Outpatient Prescriptions   Medication Sig Dispense Refill     augmented betamethasone dipropionate (DIPROLENE-AF) 0.05 % ointment Apply to affected areas of hand twice daily, everyday. Use Vaniply on top of this medication. Occlude with gloves at night. 50 g 3     BuPROPion HCl (WELLBUTRIN PO)        Calcium carb-Vitamin D 500 mg [place]-200 units (OSCAL WITH D;OYSTER SHELL CALCIUM) 500-200 MG-UNIT per tablet Take 1 tablet by mouth daily       clobetasol (TEMOVATE) 0.05 % ointment Apply thin layer twice daily to hands and feet.  Soak hands before application at bedtime. 60 g 11     doxycycline (VIBRAMYCIN) 100 MG capsule Take 1 capsule (100 mg) by mouth 2 times daily 28 capsule 0     Dupilumab (DUPIXENT) 300 MG/2ML syringe Inject 2 mLs (300 mg) Subcutaneous every 14 days 2 mL 4     FLUOXETINE HCL PO        methocarbamol (ROBAXIN) 500 MG tablet Take 500 mg by mouth       methylPREDNISolone (MEDROL DOSEPAK) 4 MG tablet Follow package instructions 21 tablet 0     naproxen (NAPROSYN) 500 MG tablet Take 500 mg by mouth       predniSONE (DELTASONE) 1 MG tablet Take 8 tablets (8 mg) by mouth daily (Patient not taking: Reported on 4/30/2018) 240 tablet 3     predniSONE (DELTASONE) 10 MG tablet Take 1 tablet (10 mg) by mouth daily (Patient not taking: Reported on 4/30/2018) 7 tablet 0     predniSONE (DELTASONE) 10 MG tablet Take 40mg [id number] then [id number],, then 20mg qday until seen. (Patient not taking: Reported on 4/30/2018) 80 tablet 0     predniSONE (DELTASONE) 5 MG tablet 8,8,7,7,6,6,5,5,4,4,3,3,2,2,1,1, (Patient not taking: Reported on 4/30/2018) 72 tablet 0     predniSONE (DELTASONE) 5 MG tablet Take 1 tablet (5 mg) by mouth daily (Patient not taking: Reported on 4/30/2018) 30 tablet 3        Allergies   Allergen Reactions     Sulfadiazine      Other reaction(s): Hives/ Urticaria - Allergy     Tramadol      Other reaction(s): Shortness Of Breath/Wheezing/Chest Tightness  - Allergy     Atorvastatin      Other reaction(s): Rash - Allergy     Bronopol Other (See Comments)     Previous Positive (+) skin patch test     Bacitracin Rash     Patch testing     Glutaraldehyde Rash     2+ patch testing     Gold Rash     Mild patch test reaction     Insulin Detemir      Other reaction(s): Headaches/ Migraines - Intolerance     Lisinopril      Other reaction(s): Headaches/ Migraines - Intolerance     Penicillins Rash     Sulfa Drugs Rash         Review of Systems:  -As per HPI  -Constitutional: The patient denies fatigue, fevers, chills, unintended weight loss, and night sweats.  -HEENT: Patient denies nonhealing oral sores.  -Skin: As above in HPI. No additional skin concerns.    Physical exam:  Vitals: There were no vitals taken for this visit.  GEN: This is a well developed, well-nourished female in no acute distress, in a pleasant mood.    SKIN: Focused examination of the palms and soles was performed.  -Hyperkeratosis of palms and soles of feet, some small fissuring, minimal erythema, tender to palpation near fissures, as expected.  -No other lesions of concern on areas examined.     Impression/Plan:  1. Chronic hand and foot eczema 2/2 toxic irritant vs allergic vs atopic psoriaform    Will arrange for repeat patch test in 3-4 weeks, given recent oral steroid use: standard + emollient + preservative series    Will need to confirm if insurance will cover    10% OTC urea cream to palms and soles of feet    Dupilumab is not covered by her insurance    If allergic component cannot be found, radiation therapy will be the next option    Fungal cultures obtained     Patient also was seen and discussed with Dr. Zuniga to confirm the above plan     staffed the patient.    Staff Physician Comments:  I saw and evaluated the patient with the resident and I agree with the assessment and plan. I was present for the examination and discussion of diagnosis and diagnostic procedures and  possible triggers. We even discussed the patient in presence of Dr. Yaima Zuniga.    Lester Aranda MD  Professor  Head of Dermato-Allergy Division  Department of Dermatology  [facility], [place], [place]      Staff Involved:  Resident(Christopher Altman)/Staff(as above)    Christopher Altman PGY-3  Plastic Surgery

## 2018-06-11 NOTE — NURSING NOTE
Dermatology Rooming Note    Kasey Ibrahim's goals for this visit include:   Chief Complaint   Patient presents with     Derm Problem     Kasey is here for a follow up regarding her dermatitis she states that the medication helped but it flared back up once medication was stopped.      Regine Yancey LPN

## 2018-06-18 ENCOUNTER — TELEPHONE (OUTPATIENT)
Dept: DERMATOLOGY | Facility: CLINIC | Age: 68
End: 2018-06-18

## 2018-06-18 NOTE — TELEPHONE ENCOUNTER
I called and spoke with Kasey and informed her that we were still waiting for her culture results. I also let her know that I send a message to Zaida Soria to do a PA for patch testing.

## 2018-06-18 NOTE — TELEPHONE ENCOUNTER
[facility] Call Center    Phone Message    May a detailed message be left on voicemail: yes    Reason for Call: Other: Pt has not rec'd a call back yet, since her 6/11/18 appt with Dr. Aranda.  at the 6/11/18 appt a nurse indicated that the pt would be called back to be told whether or not pt insurance covered allergy testing AND pt wants to know the results of the test taken to know IF there is a fungal infection.  Call pt on her cell ph and it is OK to leave a detailed msg.     Action Taken: Message routed to:  [facility] Dermatology Clinic

## 2018-06-19 NOTE — TELEPHONE ENCOUNTER
----- Message from Zaida Nguyen sent at 6/18/2018  3:13 PM CDT -----  Regarding: RE: Patch Testing   Brent Weiner,    I know that Dr Yeager has been putting the orders with the number of tests and what they are for inside the office visit note.   Since she was the first one sent to me, she fell under the radar since I think we were still figuring out how Dr ESCAMILLA was going to get the orders to me.   Now that we've figured that out, I do need an order for what he would like to test her for. I did see that she has Medicare, and so I can't actually do an auth for her, but the order will help with getting a correct CPT code for documentation.  Thanks!    Zaida    ----- Message -----     From: Regine Yancey LPN     Sent: 6/18/2018   3:06 PM       To: Zaida Nguyen  Subject: FW: Patch Testing                                Hi Zaida,    I just wanted to follow up regarding this patient and patch testing. Let me know if there is anything I need to do.     Thank you,  Regine Yancey LPN    ----- Message -----     From: Zaida Nguyen     Sent: 6/11/2018   2:27 PM       To: Regine Yancey LPN, Monserrat Hernandez, #  Subject: RE: Patch Testing                                Hi Regine,     I just talked with the  who will be working with Dr Aranda, and he will be meeting with the Dr tomorrow to figure a bunch of coding stuff out. Once he does that, he'll be able to meet with me and relay the info that is relevant to what I'll be doing for the clinic.    So ff it's ok to wait until we get some more info both on this patient specifically, (I gave her info to the ) as well as in general, that would greatly help, and ensure accuracy going forward in this derm allergy clinic.    Thanks!    Zaida        ----- Message -----     From: Regine Yancey LPN     Sent: 6/11/2018  11:25 AM       To: Zaida Nguyen  Subject: Patch Testing                                    Akira Cerda,    I was wondering if the patients insurance will cover  patch testing. And if not do you know the cost to the patient.    Thank you,  Regine Yancey LPN

## 2018-06-19 NOTE — TELEPHONE ENCOUNTER
Order for PATCH TESTS      [] Inpatient / [place]....   Bed ....  [x] Outpatient    Skin Atopy           :       [] Yes   [x] No  Rhinitis/Sinusitis :       [] Yes   [x] No  Allergic Asthma   :       [] Yes   [x] No  Leg ulcers            :       [] Yes   [x] No  Hand eczema       :       [x] Yes   [] No    Leading hand :  [] R [] L               [] Ambidextrous                      Reason for tests (Suspected allergy): ...  Known previous allergies: ...    [x] Standardized panels  [x] Standard panel (40 tests)  [x] Preservatives & Antimicrobials (31 tests)  [x] Emulsifiers & Additives (25 tests)   [] Perfumes/Flavours & Plants (25 tests)  [] Hairdresser panel (12 tests)  [] Rubber Chemicals (22 tests)  [] Plastics (26 tests)  [] Colorants/Dyes/Food additives (20 tests)  [] Metals (implants/dental) (23 tests)  [] Local anaesthetics/NSAIDs (12 tests)  [] Antibiotics & Antimycotics (14 tests)   [] Corticosteroids (15 tests)   [] Photopatch test (32 tests)   [] others: ...    [] Patient's own products: ...    DO NOT test if chemical or biological identity is unknown!     always ask from patient the product information and safety sheets (MSDS)     [] Patient needs consultation with Allergy team  [x] Tests discussed with Allergy team (can have direct appointment for patch tests)

## 2018-07-02 ENCOUNTER — OFFICE VISIT (OUTPATIENT)
Dept: DERMATOLOGY | Facility: CLINIC | Age: 68
End: 2018-07-02
Payer: COMMERCIAL

## 2018-07-02 DIAGNOSIS — L25.8 CONTACT DERMATITIS DUE TO OTHER AGENT, UNSPECIFIED CONTACT DERMATITIS TYPE: ICD-10-CM

## 2018-07-02 DIAGNOSIS — B35.2 TINEA MANUUM: Primary | ICD-10-CM

## 2018-07-02 RX ORDER — TACROLIMUS 1 MG/G
OINTMENT TOPICAL 2 TIMES DAILY
Qty: 60 G | Refills: 0 | Status: SHIPPED | OUTPATIENT
Start: 2018-07-02 | End: 2018-08-27

## 2018-07-02 RX ORDER — ITRACONAZOLE 100 MG/1
100 CAPSULE ORAL DAILY
Qty: 14 CAPSULE | Refills: 0 | Status: SHIPPED | OUTPATIENT
Start: 2018-07-02 | End: 2018-07-16

## 2018-07-02 RX ORDER — BETAMETHASONE DIPROPIONATE 0.05 %
OINTMENT (GRAM) TOPICAL 2 TIMES DAILY
Qty: 50 G | Refills: 1 | Status: SHIPPED | OUTPATIENT
Start: 2018-07-02 | End: 2018-08-27

## 2018-07-02 ASSESSMENT — PAIN SCALES - GENERAL: PAINLEVEL: WORST PAIN (10)

## 2018-07-02 NOTE — MR AVS SNAPSHOT
After Visit Summary   7/2/2018    Kasey Ibrahim    MRN: [id number]           Patient Information     Date Of Birth          1950        Visit Information        Provider Department      7/2/2018 9:45 AM Lester Aranda MD [facility] Dermatology        Today's Diagnoses     Tinea manuum    -  1    Contact dermatitis due to other agent, unspecified contact dermatitis type           Follow-ups after your visit        Your next 10 appointments already scheduled     Jul 23, 2018  9:00 AM CDT   (Arrive by 8:45 AM)   New Allergy with Lester Aranda MD   [facility] D[facility] ([facility])    [address] [place] [id number] [phone]            Jul 25, 2018  2:45 PM CDT   (Arrive by 2:30 PM)   Return Allergy with Lester Aranda MD   [facility] D[facility] ([facility])    [address] [place] [address] [id number] [phone]            Jul 27, 2018  2:30 PM CDT   (Arrive by 2:15 PM)   Return Allergy with Lester Aranda MD   [facility] ([facility])    [address] [place] [address] [id number] [phone]              Who to contact     Please call your clinic at [phone] to:    Ask questions about your health    Make or cancel appointments    Discuss your medicines    Learn about your test results    Speak to your doctor            Additional Information About Your Visit        MyChart Information     [facility] gives you secure access to your electronic health record. If you see a primary care provider, you can also send messages to your care team and make appointments. If you have questions, please call your primary care clinic.  If you do not have a primary care provider, please call [phone] and they will assist you.      [facility] is an electronic gateway that provides easy, online access to your medical records. With [facility], you can  request a clinic appointment, read your test results, renew a prescription or communicate with your care team.     To access your existing account, please contact your [facility] or call [phone] for assistance.        Care EveryWhere ID     This is your Care EveryWhere ID. This could be used by other organizations to access your [facility] medical records  [id number]         Blood Pressure from Last 3 Encounters:   07/24/17 114/63   07/03/17 115/59   04/17/17 131/88    Weight from Last 3 Encounters:   07/24/17 78.9 kg (174 lb)   07/03/17 77.1 kg (170 lb)   03/20/17 80.6 kg (177 lb 12.8 oz)              Today, you had the following     No orders found for display         Today's Medication Changes          These changes are accurate as of 7/2/18 10:10 AM.  If you have any questions, ask your nurse or doctor.               Start taking these medicines.        Dose/Directions    betamethasone dipropionate 0.05 % ointment   Commonly known as:  DIPROSONE   Used for:  Contact dermatitis due to other agent, unspecified contact dermatitis type   Started by:  Lester Aranda MD        Apply topically 2 times daily   Quantity:  50 g   Refills:  1       itraconazole 100 MG capsule   Commonly known as:  SPORANOX   Used for:  Tinea manuum   Started by:  Lester Aranda MD        Dose:  100 mg   Take 1 capsule (100 mg) by mouth daily for 14 days   Quantity:  14 capsule   Refills:  0       tacrolimus 0.1 % ointment   Commonly known as:  PROTOPIC   Used for:  Contact dermatitis due to other agent, unspecified contact dermatitis type   Started by:  Lester Aranda MD        Apply topically 2 times daily   Quantity:  60 g   Refills:  0            Where to get your medicines      These medications were sent to [facility] [place], [place] - [address]   [address] [facility] [address]     Phone:  [phone]     betamethasone dipropionate 0.05 % ointment     itraconazole 100 MG capsule         Some of these will need a paper prescription and others can be bought over the counter.  Ask your nurse if you have questions.     Bring a paper prescription for each of these medications     tacrolimus 0.1 % ointment                Primary Care Provider Office Phone # Fax #    Ankit Dominguez Martini [phone]       [facility] [address] [facility] [address]        Equal Access to Services     LISA BALLARD : Hadii aad ku hadasho Soomaali, waaxda luqadaha, qaybta kaalmada adeegyada, waxay druin haysalvadorn hermes kolbgreciablessing figueroa. So [facility] [phone].    ATENCIÓN: Si michelle enriquez, tiene a young disposición servicios gratuitos de asistencia lingüística. Llame al [phone].    We comply with applicable federal civil rights laws and [place] laws. We do not discriminate on the basis of race, color, national origin, age, disability, sex, sexual orientation, or gender identity.            Thank you!     Thank you for choosing [facility] DERMATOLOGY  for your care. Our goal is always to provide you with excellent care. Hearing back from our patients is one way we can continue to improve our services. Please take a few minutes to complete the written survey that you may receive in the mail after your visit with us. Thank you!             Your Updated Medication List - Protect others around you: Learn how to safely use, store and throw away your medicines at www.disposemymeds.org.          This list is accurate as of 7/2/18 10:10 AM.  Always use your most recent med list.                   Brand Name Dispense Instructions for use Diagnosis    augmented betamethasone dipropionate 0.05 % ointment    DIPROLENE-AF    50 g    Apply to affected areas of hand twice daily, everyday. Use Vaniply on top of this medication. Occlude with gloves at night.    Allergic contact dermatitis due to drugs in contact with skin       betamethasone dipropionate 0.05 % ointment     DIPROSONE    50 g    Apply topically 2 times daily    Contact dermatitis due to other agent, unspecified contact dermatitis type       Calcium carb-Vitamin D 500 mg [place]-200 units 500-200 MG-UNIT per tablet    OSCAL with D;Oyster Shell Calcium     Take 1 tablet by mouth daily        clobetasol 0.05 % ointment    TEMOVATE    60 g    Apply thin layer twice daily to hands and feet.  Soak hands before application at bedtime.    Allergic contact dermatitis, unspecified trigger, Chronic dermatitis of hands       doxycycline 100 MG capsule    VIBRAMYCIN    28 capsule    Take 1 capsule (100 mg) by mouth 2 times daily    Staphylococcal infection, Foot dermatitis, Hand dermatitis       Dupilumab 300 MG/2ML syringe    DUPIXENT    2 mL    Inject 2 mLs (300 mg) Subcutaneous every 14 days    Hand dermatitis, Foot dermatitis       FLUOXETINE HCL PO           itraconazole 100 MG capsule    SPORANOX    14 capsule    Take 1 capsule (100 mg) by mouth daily for 14 days    Tinea manuum       methocarbamol 500 MG tablet    ROBAXIN     Take 500 mg by mouth        methylPREDNISolone 4 MG tablet    MEDROL DOSEPAK    21 tablet    Follow package instructions    Chronic dermatitis of hands       naproxen 500 MG tablet    NAPROSYN     Take 500 mg by mouth        * predniSONE 10 MG tablet    DELTASONE    80 tablet    Take 40mg [id number] then [id number],, then 20mg qday until seen.    Chronic dermatitis of hands, Allergic contact dermatitis, unspecified trigger       * predniSONE 5 MG tablet    DELTASONE    30 tablet    Take 1 tablet (5 mg) by mouth daily    Allergic contact dermatitis, unspecified trigger       * predniSONE 1 MG tablet    DELTASONE    240 tablet    Take 8 tablets (8 mg) by mouth daily    Allergic contact dermatitis due to drugs in contact with skin       * predniSONE 10 MG tablet    DELTASONE    7 tablet    Take 1 tablet (10 mg) by mouth daily    Allergic contact dermatitis, unspecified trigger       * predniSONE 5 MG  tablet    DELTASONE    72 tablet    8,8,7,7,6,6,5,5,4,4,3,3,2,2,1,1,    Allergic contact dermatitis, unspecified trigger       tacrolimus 0.1 % ointment    PROTOPIC    60 g    Apply topically 2 times daily    Contact dermatitis due to other agent, unspecified contact dermatitis type       WELLBUTRIN PO           * Notice:  This list has 5 medication(s) that are the same as other medications prescribed for you. Read the directions carefully, and ask your doctor or other care provider to review them with you.

## 2018-07-02 NOTE — LETTER
7/2/2018       RE: Kasey Ibrahim  [address] [facility] [id number]     Dear Colleague,    Thank you for referring your patient, Kasey Ibrahim, to the [facility]. Please see a copy of my visit note below.    [facility] Dermatology Note      Dermatology Problem List:  1.Chronic hand and foot eczema 2/2 toxic irritant vs allergic vs atopic psoriaform    Encounter Date: Jul 2, 2018    CC:   Chief Complaint   Patient presents with     Derm Problem     Kasey is here today to be seen for a possible fungal infection of her hands.          History of Present Illness:  Ms. Kasey Ibrahim is a 68 year old female who presents with chronic hand and foot eczema for past 4.5 years.  She has tried and failed: nb uvb, MMF, acitretin, methotrexate (all prior to presenting at the ), minocycline ( 8/2016-3/20/17), prednisone, clobetasol 0.05% ointment, CSA 3mg/kg/day. She just finished a medrol dose pack, which helped.  She had previous allergy patch test in 2013 where she was only found to have +1 to gold and bacitracin, in other words, negative patch test. ([facility]  No personal or family hx of psoriasis or hay fever.  She uses T gel for posterior scalp itching, which helps.      Past Medical History:   Patient Active Problem List   Diagnosis     Cholangitis     Past Medical History:   Diagnosis Date     Depression      Hand dermatitis      Past Surgical History:   Procedure Laterality Date     FOOT SURGERY       PHACOEMULSIFICATION WITH STANDARD INTRAOCULAR LENS IMPLANT Left 7/3/2017    Procedure: PHACOEMULSIFICATION WITH STANDARD INTRAOCULAR LENS IMPLANT;  Left cataract removal with implant;  Surgeon: Dominguez Payne MD;  Location: [facility] OR     PHACOEMULSIFICATION WITH STANDARD INTRAOCULAR LENS IMPLANT Right 7/24/2017    Procedure: PHACOEMULSIFICATION WITH STANDARD INTRAOCULAR LENS IMPLANT;  Right  cataract removal with implant;  Surgeon: Dominguez Payne MD;  Location: [facility] OR       Social History:  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     Family History:  No family hx of atopic dermatitis, hay fever, psoriasis    Medications:  Current Outpatient Prescriptions   Medication Sig Dispense Refill     augmented betamethasone dipropionate (DIPROLENE-AF) 0.05 % ointment Apply to affected areas of hand twice daily, everyday. Use Vaniply on top of this medication. Occlude with gloves at night. 50 g 3     BuPROPion HCl (WELLBUTRIN PO)        Calcium carb-Vitamin D 500 mg [place]-200 units (OSCAL WITH D;OYSTER SHELL CALCIUM) 500-200 MG-UNIT per tablet Take 1 tablet by mouth daily       clobetasol (TEMOVATE) 0.05 % ointment Apply thin layer twice daily to hands and feet.  Soak hands before application at bedtime. 60 g 11     doxycycline (VIBRAMYCIN) 100 MG capsule Take 1 capsule (100 mg) by mouth 2 times daily 28 capsule 0     Dupilumab (DUPIXENT) 300 MG/2ML syringe Inject 2 mLs (300 mg) Subcutaneous every 14 days 2 mL 4     FLUOXETINE HCL PO        methocarbamol (ROBAXIN) 500 MG tablet Take 500 mg by mouth       methylPREDNISolone (MEDROL DOSEPAK) 4 MG tablet Follow package instructions 21 tablet 0     naproxen (NAPROSYN) 500 MG tablet Take 500 mg by mouth       predniSONE (DELTASONE) 1 MG tablet Take 8 tablets (8 mg) by mouth daily (Patient not taking: Reported on 4/30/2018) 240 tablet 3     predniSONE (DELTASONE) 10 MG tablet Take 1 tablet (10 mg) by mouth daily (Patient not taking: Reported on 4/30/2018) 7 tablet 0     predniSONE (DELTASONE) 10 MG tablet Take 40mg ouhrj8xidh then 93sntjnyq1eoze,, then 20mg qday until seen. (Patient not taking: Reported on 4/30/2018) 80 tablet 0     predniSONE (DELTASONE) 5 MG tablet 8,8,7,7,6,6,5,5,4,4,3,3,2,2,1,1, (Patient not taking: Reported on 4/30/2018) 72 tablet 0     predniSONE (DELTASONE) 5 MG tablet Take 1 tablet (5 mg) by mouth daily (Patient not taking: Reported  on 4/30/2018) 30 tablet 3        Allergies   Allergen Reactions     Sulfadiazine      Other reaction(s): Hives/ Urticaria - Allergy     Tramadol      Other reaction(s): Shortness Of Breath/Wheezing/Chest Tightness - Allergy     Atorvastatin      Other reaction(s): Rash - Allergy     Bronopol Other (See Comments)     Previous Positive (+) skin patch test     Bacitracin Rash     Patch testing     Glutaraldehyde Rash     2+ patch testing     Gold Rash     Mild patch test reaction     Insulin Detemir      Other reaction(s): Headaches/ Migraines - Intolerance     Lisinopril      Other reaction(s): Headaches/ Migraines - Intolerance     Penicillins Rash     Sulfa Drugs Rash         Review of Systems:  -As per HPI  -Constitutional: The patient denies fatigue, fevers, chills, unintended weight loss, and night sweats.  -HEENT: Patient denies nonhealing oral sores.  -Skin: As above in HPI. No additional skin concerns.    Physical exam:  Vitals: There were no vitals taken for this visit.  GEN: This is a well developed, well-nourished female in no acute distress, in a pleasant mood.    SKIN: Focused examination of the palms and soles was performed.  -Hyperkeratosis of palms and soles of feet, some small fissuring, minimal erythema, tender to palpation near fissures, as expected.  ==> hand and foot dermatosis worse than some weeks ago.     Impression/Plan:  1. Chronic hand and foot eczema 2/2 toxic irritant vs allergic vs atopic psoriaform    Will arrange for repeat patch test in 3-4 weeks, given recent oral steroid use: standard + emollient + preservative series    Will need to confirm if insurance will cover    10% OTC urea cream to palms and soles of feet and Vaseline and uses betamethasone ointment ==> add protopic ointment    Dupilumab is not covered by her insurance    If allergic component cannot be found, radiation therapy will be the next option    Fungal cultures obtained ==> in culture Sagrahamala species isolated  (contaminant?) ==> try Itraconazole 100mg for 2 weeks daily    Patient also was seen and discussed with Dr. Zuniga to confirm the above plan      Order for PATCH TESTS      [] Inpatient / [place]....   Bed ....  [x] Outpatient    Skin Atopy           :       [x] Yes   [] No  Rhinitis/Sinusitis :       [x] Yes   [] No  Allergic Asthma   :       [] Yes   [] No  Leg ulcers            :       [] Yes   [x] No  Hand eczema       :       [x] Yes   [] No    Leading hand :  [] R [] L               [] Ambidextrous                      Reason for tests (Suspected allergy): contact allergy on hands/feet or Psoriasis  Known previous allergies: ...    [] Standardized panels  [x] Standard panel (40 tests)  [x] Preservatives & Antimicrobials (31 tests)  [x] Emulsifiers & Additives (25 tests)   [] Perfumes/Flavours & Plants (25 tests)  [] Hairdresser panel (12 tests)  [] Rubber Chemicals (22 tests)  [] Plastics (26 tests)  [] Colorants/Dyes/Food additives (20 tests)  [] Metals (implants/dental) (23 tests)  [] Local anaesthetics/NSAIDs (12 tests)  [] Antibiotics & Antimycotics (14 tests)   [x] Corticosteroids (15 tests)   [] Photopatch test (32 tests)   [] others: ...    [] Patient's own products: ...    DO NOT test if chemical or biological identity is unknown!     always ask from patient the product information and safety sheets (MSDS)     [] Patient needs consultation with Allergy team  [x] Tests discussed with Allergy team (can have direct appointment for patch tests)    Again, thank you for allowing me to participate in the care of your patient.      Sincerely,    Lester Aranda MD

## 2018-07-02 NOTE — PROGRESS NOTES
[facility] Dermatology Note      Dermatology Problem List:  1.Chronic hand and foot eczema 2/2 toxic irritant vs allergic vs atopic psoriaform    Encounter Date: Jul 2, 2018    CC:   Chief Complaint   Patient presents with     Derm Problem     Kasey is here today to be seen for a possible fungal infection of her hands.          History of Present Illness:  Ms. Kasey Ibrahim is a 68 year old female who presents with chronic hand and foot eczema for past 4.5 years.  She has tried and failed: nb uvb, MMF, acitretin, methotrexate (all prior to presenting at the ), minocycline ( 8/2016-3/20/17), prednisone, clobetasol 0.05% ointment, CSA 3mg/kg/day. She just finished a medrol dose pack, which helped.  She had previous allergy patch test in 2013 where she was only found to have +1 to gold and bacitracin, in other words, negative patch test. (See [facility] from [facility]).  No personal or family hx of psoriasis or hay fever.  She uses T gel for posterior scalp itching, which helps.      Past Medical History:   Patient Active Problem List   Diagnosis     Cholangitis     Past Medical History:   Diagnosis Date     Depression      Hand dermatitis      Past Surgical History:   Procedure Laterality Date     FOOT SURGERY       PHACOEMULSIFICATION WITH STANDARD INTRAOCULAR LENS IMPLANT Left 7/3/2017    Procedure: PHACOEMULSIFICATION WITH STANDARD INTRAOCULAR LENS IMPLANT;  Left cataract removal with implant;  Surgeon: Dominguez Payne MD;  Location: [facility] OR     PHACOEMULSIFICATION WITH STANDARD INTRAOCULAR LENS IMPLANT Right 7/24/2017    Procedure: PHACOEMULSIFICATION WITH STANDARD INTRAOCULAR LENS IMPLANT;  Right cataract removal with implant;  Surgeon: Dominguez Payne MD;  Location: [facility] OR       Social History:  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     Family History:  No family hx of atopic dermatitis, hay fever, psoriasis    Medications:  Current Outpatient Prescriptions    Medication Sig Dispense Refill     augmented betamethasone dipropionate (DIPROLENE-AF) 0.05 % ointment Apply to affected areas of hand twice daily, everyday. Use Vaniply on top of this medication. Occlude with gloves at night. 50 g 3     BuPROPion HCl (WELLBUTRIN PO)        Calcium carb-Vitamin D 500 mg [place]-200 units (OSCAL WITH D;OYSTER SHELL CALCIUM) 500-200 MG-UNIT per tablet Take 1 tablet by mouth daily       clobetasol (TEMOVATE) 0.05 % ointment Apply thin layer twice daily to hands and feet.  Soak hands before application at bedtime. 60 g 11     doxycycline (VIBRAMYCIN) 100 MG capsule Take 1 capsule (100 mg) by mouth 2 times daily 28 capsule 0     Dupilumab (DUPIXENT) 300 MG/2ML syringe Inject 2 mLs (300 mg) Subcutaneous every 14 days 2 mL 4     FLUOXETINE HCL PO        methocarbamol (ROBAXIN) 500 MG tablet Take 500 mg by mouth       methylPREDNISolone (MEDROL DOSEPAK) 4 MG tablet Follow package instructions 21 tablet 0     naproxen (NAPROSYN) 500 MG tablet Take 500 mg by mouth       predniSONE (DELTASONE) 1 MG tablet Take 8 tablets (8 mg) by mouth daily (Patient not taking: Reported on 4/30/2018) 240 tablet 3     predniSONE (DELTASONE) 10 MG tablet Take 1 tablet (10 mg) by mouth daily (Patient not taking: Reported on 4/30/2018) 7 tablet 0     predniSONE (DELTASONE) 10 MG tablet Take 40mg [id number] then [id number],, then 20mg qday until seen. (Patient not taking: Reported on 4/30/2018) 80 tablet 0     predniSONE (DELTASONE) 5 MG tablet 8,8,7,7,6,6,5,5,4,4,3,3,2,2,1,1, (Patient not taking: Reported on 4/30/2018) 72 tablet 0     predniSONE (DELTASONE) 5 MG tablet Take 1 tablet (5 mg) by mouth daily (Patient not taking: Reported on 4/30/2018) 30 tablet 3        Allergies   Allergen Reactions     Sulfadiazine      Other reaction(s): Hives/ Urticaria - Allergy     Tramadol      Other reaction(s): Shortness Of Breath/Wheezing/Chest Tightness - Allergy     Atorvastatin      Other reaction(s): Rash - Allergy      Bronopol Other (See Comments)     Previous Positive (+) skin patch test     Bacitracin Rash     Patch testing     Glutaraldehyde Rash     2+ patch testing     Gold Rash     Mild patch test reaction     Insulin Detemir      Other reaction(s): Headaches/ Migraines - Intolerance     Lisinopril      Other reaction(s): Headaches/ Migraines - Intolerance     Penicillins Rash     Sulfa Drugs Rash         Review of Systems:  -As per HPI  -Constitutional: The patient denies fatigue, fevers, chills, unintended weight loss, and night sweats.  -HEENT: Patient denies nonhealing oral sores.  -Skin: As above in HPI. No additional skin concerns.    Physical exam:  Vitals: There were no vitals taken for this visit.  GEN: This is a well developed, well-nourished female in no acute distress, in a pleasant mood.    SKIN: Focused examination of the palms and soles was performed.  -Hyperkeratosis of palms and soles of feet, some small fissuring, minimal erythema, tender to palpation near fissures, as expected.  ==> hand and foot dermatosis worse than some weeks ago.     Impression/Plan:  1. Chronic hand and foot eczema 2/2 toxic irritant vs allergic vs atopic psoriaform    Will arrange for repeat patch test in 3-4 weeks, given recent oral steroid use: standard + emollient + preservative series    Will need to confirm if insurance will cover    10% OTC urea cream to palms and soles of feet and Vaseline and uses betamethasone ointment ==> add protopic ointment    Dupilumab is not covered by her insurance    If allergic component cannot be found, radiation therapy will be the next option    Fungal cultures obtained ==> in culture Sagrahamala species isolated (contaminant?) ==> try Itraconazole 100mg for 2 weeks daily    Patient also was seen and discussed with Dr. Zuniga to confirm the above plan      Order for PATCH TESTS      [] Inpatient / [place]....   Bed ....  [x] Outpatient    Skin Atopy           :       [x] Yes   []  No  Rhinitis/Sinusitis :       [x] Yes   [] No  Allergic Asthma   :       [] Yes   [] No  Leg ulcers            :       [] Yes   [x] No  Hand eczema       :       [x] Yes   [] No    Leading hand :  [] R [] L               [] Ambidextrous                      Reason for tests (Suspected allergy): contact allergy on hands/feet or Psoriasis  Known previous allergies: ...    [] Standardized panels  [x] Standard panel (40 tests)  [x] Preservatives & Antimicrobials (31 tests)  [x] Emulsifiers & Additives (25 tests)   [] Perfumes/Flavours & Plants (25 tests)  [] Hairdresser panel (12 tests)  [] Rubber Chemicals (22 tests)  [] Plastics (26 tests)  [] Colorants/Dyes/Food additives (20 tests)  [] Metals (implants/dental) (23 tests)  [] Local anaesthetics/NSAIDs (12 tests)  [] Antibiotics & Antimycotics (14 tests)   [x] Corticosteroids (15 tests)   [] Photopatch test (32 tests)   [] others: ...    [] Patient's own products: ...    DO NOT test if chemical or biological identity is unknown!     always ask from patient the product information and safety sheets (MSDS)     [] Patient needs consultation with Allergy team  [x] Tests discussed with Allergy team (can have direct appointment for patch tests)

## 2018-07-02 NOTE — NURSING NOTE
Dermatology Rooming Note    Kasey Ibrahim's goals for this visit include:   Chief Complaint   Patient presents with     Derm Problem     Kasey is here today to be seen for a possible fungal infection of her hands.      Katelyn Payne MA

## 2018-07-09 LAB
BACTERIA SPEC CULT: ABNORMAL
BACTERIA SPEC CULT: ABNORMAL
SPECIMEN SOURCE: ABNORMAL

## 2018-07-23 ENCOUNTER — OFFICE VISIT (OUTPATIENT)
Dept: DERMATOLOGY | Facility: CLINIC | Age: 68
End: 2018-07-23
Payer: MEDICARE

## 2018-07-23 VITALS — HEART RATE: 68 BPM | SYSTOLIC BLOOD PRESSURE: 126 MMHG | DIASTOLIC BLOOD PRESSURE: 79 MMHG

## 2018-07-23 DIAGNOSIS — L23.9 ALLERGIC CONTACT DERMATITIS, UNSPECIFIED TRIGGER: Primary | ICD-10-CM

## 2018-07-23 ASSESSMENT — PAIN SCALES - GENERAL: PAINLEVEL: EXTREME PAIN (8)

## 2018-07-23 NOTE — PATIENT INSTRUCTIONS
Allergen Patch Tests    What are allergen patch tests?    Done to look for skin allergies that may be causing rashes and irritation    Three test panels with small amounts of the most common substances that cause skin allergies are taped onto your skin (usually on the back).    If you are allergic, there will be a small (about 1 inch by 1 inch) area of irritation where it was placed on your skin.    The substances are numbered, so it is easy to tell what is causing a skin reaction.     What should I do after the tests are placed?    Do not wash the test area. You may take a bath, but you may not take a shower. Keep the area dry.    Do not exercise or do activities that may cause sweating or stretch the skin.    Put on more tape if the test panels start to come off.  Leave the test panels in place for 48 hours.    You can remove the panels after 48 hours. Place the panels on wax paper or in a plastic bag.    If the black marker applied by the nurse fades, you can use a dark pen to bijan around the panel sites.    Check for redness, swelling, rash, or blisters 30 minutes after you take off the panels.    Write down the numbers of the sites that are:  o  Red, swollen, blistered, itching, or burning     Bring the test panels with you to your next doctor visit.    What can I expect?    Itching or burning at the test site may happen if you are allergic.  Do not rub or scratch. If itching or burning is not tolerable, call the doctor.    Your first follow-up visit will be 2-3 days after patch test placement. We will see you again 7 days after patch test placement.    Reactions can sometimes occur 1 to 2 weeks after the tests are applied. If this happens, call your doctor.

## 2018-07-23 NOTE — NURSING NOTE
Dermatology Rooming Note    Kasey Ibrahim's goals for this visit include:   Chief Complaint   Patient presents with     Derm Problem     Kasey is her for patch testing day 1.      Regine Yancey LPN

## 2018-07-23 NOTE — MR AVS SNAPSHOT
After Visit Summary   7/23/2018    Kasey Ibrahim    MRN: [id number]           Patient Information     Date Of Birth          1950        Visit Information        Provider Department      7/23/2018 9:00 AM Lester Aranda MD [facility]        Care Instructions    Allergen Patch Tests    What are allergen patch tests?    Done to look for skin allergies that may be causing rashes and irritation    Three test panels with small amounts of the most common substances that cause skin allergies are taped onto your skin (usually on the back).    If you are allergic, there will be a small (about 1 inch by 1 inch) area of irritation where it was placed on your skin.    The substances are numbered, so it is easy to tell what is causing a skin reaction.     What should I do after the tests are placed?    Do not wash the test area. You may take a bath, but you may not take a shower. Keep the area dry.    Do not exercise or do activities that may cause sweating or stretch the skin.    Put on more tape if the test panels start to come off.  Leave the test panels in place for 48 hours.    You can remove the panels after 48 hours. Place the panels on wax paper or in a plastic bag.    If the black marker applied by the nurse fades, you can use a dark pen to bijan around the panel sites.    Check for redness, swelling, rash, or blisters 30 minutes after you take off the panels.    Write down the numbers of the sites that are:  o  Red, swollen, blistered, itching, or burning     Bring the test panels with you to your next doctor visit.    What can I expect?    Itching or burning at the test site may happen if you are allergic.  Do not rub or scratch. If itching or burning is not tolerable, call the doctor.    Your first follow-up visit will be 2-3 days after patch test placement. We will see you again 7 days after patch test placement.    Reactions can sometimes occur 1 to 2 weeks after the tests are  applied. If this happens, call your doctor.                Follow-ups after your visit        Your next 10 appointments already scheduled     Jul 25, 2018  2:45 PM CDT   (Arrive by 2:30 PM)   Return Allergy with Lester Aranda MD   [facility] ([facility])    [address] [id number] [phone]            Jul 27, 2018  2:30 PM CDT   (Arrive by 2:15 PM)   Return Allergy with Lester Aranda MD   [facility] ([facility])    [address] [id number] [phone]              Who to contact     Please call your clinic at [phone] to:    Ask questions about your health    Make or cancel appointments    Discuss your medicines    Learn about your test results    Speak to your doctor            Additional Information About Your Visit        [employer] Information     [employer] gives you secure access to your electronic health record. If you see a primary care provider, you can also send messages to your care team and make appointments. If you have questions, please call your primary care clinic.  If you do not have a primary care provider, please call [phone] and they will assist you.      [employer] is an electronic gateway that provides easy, online access to your medical records. With [employer], you can request a clinic appointment, read your test results, renew a prescription or communicate with your care team.     To access your existing account, please contact your [facility] or call [phone] for assistance.        Care EveryWhere ID     This is your Care EveryWhere ID. This could be used by other organizations to access your [place] medical records  [id number]        Your Vitals Were     Pulse                   68            Blood Pressure from Last 3 Encounters:   07/23/18 126/79   07/24/17 114/63   07/03/17 115/59    Weight from Last  3 Encounters:   07/24/17 78.9 kg (174 lb)   07/03/17 77.1 kg (170 lb)   03/20/17 80.6 kg (177 lb 12.8 oz)              Today, you had the following     No orders found for display       Primary Care Provider Office Phone # Fax #    Ankit Dominguez Martini [phone]       [facility] [address] [facility] [address]        Equal Access to Services     LISA BALLARD : Hadii aad ku hadasho Soomaali, waaxda luqadaha, qaybta kaalmada adeegyada, waxay idiin hayaan adeeg conor dc . So [facility] [phone].    ATENCIÓN: Si michelle enriquez, tiene a young disposición servicios gratuitos de asistencia lingüística. [facility] [phone].    We comply with applicable federal civil rights laws and [place] laws. We do not discriminate on the basis of race, color, national origin, age, disability, sex, sexual orientation, or gender identity.            Thank you!     Thank you for choosing [facility]  for your care. Our goal is always to provide you with excellent care. Hearing back from our patients is one way we can continue to improve our services. Please take a few minutes to complete the written survey that you may receive in the mail after your visit with us. Thank you!             Your Updated Medication List - Protect others around you: Learn how to safely use, store and throw away your medicines at www.disposemymeds.org.          This list is accurate as of 7/23/18 10:39 AM.  Always use your most recent med list.                   Brand Name Dispense Instructions for use Diagnosis    augmented betamethasone dipropionate 0.05 % ointment    DIPROLENE-AF    50 g    Apply to affected areas of hand twice daily, everyday. Use Vaniply on top of this medication. Occlude with gloves at night.    Allergic contact dermatitis due to drugs in contact with skin       betamethasone dipropionate 0.05 % ointment    DIPROSONE    50 g    Apply topically 2 times daily    Contact dermatitis due to  other agent, unspecified contact dermatitis type       Calcium carb-Vitamin D 500 mg [place]-200 units 500-200 MG-UNIT per tablet    OSCAL with D;Oyster Shell Calcium     Take 1 tablet by mouth daily        clobetasol 0.05 % ointment    TEMOVATE    60 g    Apply thin layer twice daily to hands and feet.  Soak hands before application at bedtime.    Allergic contact dermatitis, unspecified trigger, Chronic dermatitis of hands       doxycycline 100 MG capsule    VIBRAMYCIN    28 capsule    Take 1 capsule (100 mg) by mouth 2 times daily    Staphylococcal infection, Foot dermatitis, Hand dermatitis       Dupilumab 300 MG/2ML syringe    DUPIXENT    2 mL    Inject 2 mLs (300 mg) Subcutaneous every 14 days    Hand dermatitis, Foot dermatitis       FLUOXETINE HCL PO           methocarbamol 500 MG tablet    ROBAXIN     Take 500 mg by mouth        methylPREDNISolone 4 MG tablet    MEDROL DOSEPAK    21 tablet    Follow package instructions    Chronic dermatitis of hands       naproxen 500 MG tablet    NAPROSYN     Take 500 mg by mouth        * predniSONE 10 MG tablet    DELTASONE    80 tablet    Take 40mg [id number] then [id number],, then 20mg qday until seen.    Chronic dermatitis of hands, Allergic contact dermatitis, unspecified trigger       * predniSONE 5 MG tablet    DELTASONE    30 tablet    Take 1 tablet (5 mg) by mouth daily    Allergic contact dermatitis, unspecified trigger       * predniSONE 1 MG tablet    DELTASONE    240 tablet    Take 8 tablets (8 mg) by mouth daily    Allergic contact dermatitis due to drugs in contact with skin       * predniSONE 10 MG tablet    DELTASONE    7 tablet    Take 1 tablet (10 mg) by mouth daily    Allergic contact dermatitis, unspecified trigger       * predniSONE 5 MG tablet    DELTASONE    72 tablet    8,8,7,7,6,6,5,5,4,4,3,3,2,2,1,1,    Allergic contact dermatitis, unspecified trigger       tacrolimus 0.1 % ointment    PROTOPIC    60 g    Apply topically 2 times daily     Contact dermatitis due to other agent, unspecified contact dermatitis type       WELLBUTRIN PO           * Notice:  This list has 5 medication(s) that are the same as other medications prescribed for you. Read the directions carefully, and ask your doctor or other care provider to review them with you.

## 2018-07-23 NOTE — PROGRESS NOTES
[facility] Dermatology Note      Dermatology Problem List:  1.Chronic hand and foot eczema 2/2 toxic irritant vs allergic vs atopic psoriaform    Encounter Date: Jul 23, 2018    CC:   Chief Complaint   Patient presents with     Derm Problem     Kasey is her for patch testing day 1.          History of Present Illness:  Ms. Kasey Ibrahim is a 68 year old female who presents with chronic hand and foot eczema for past 4.5 years.  She has tried and failed: nb uvb, MMF, acitretin, methotrexate (all prior to presenting at the ), minocycline ( 8/2016-3/20/17), prednisone, clobetasol 0.05% ointment, CSA 3mg/kg/day. She just finished a medrol dose pack, which helped.  She had previous allergy patch test in 2013 where she was only found to have +1 to gold and bacitracin, in other words, negative patch test. (See [facility] records from [facility]).  No personal or family hx of psoriasis or hay fever.  She uses T gel for posterior scalp itching, which helps.      Past Medical History:   Patient Active Problem List   Diagnosis     Cholangitis     Past Medical History:   Diagnosis Date     Depression      Hand dermatitis      Past Surgical History:   Procedure Laterality Date     FOOT SURGERY       PHACOEMULSIFICATION WITH STANDARD INTRAOCULAR LENS IMPLANT Left 7/3/2017    Procedure: PHACOEMULSIFICATION WITH STANDARD INTRAOCULAR LENS IMPLANT;  Left cataract removal with implant;  Surgeon: Dominguez Payne MD;  Location: [facility] OR     PHACOEMULSIFICATION WITH STANDARD INTRAOCULAR LENS IMPLANT Right 7/24/2017    Procedure: PHACOEMULSIFICATION WITH STANDARD INTRAOCULAR LENS IMPLANT;  Right cataract removal with implant;  Surgeon: Dominguez Payne MD;  Location: [facility] OR       Social History:  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     Family History:  No family hx of atopic dermatitis, hay fever, psoriasis    Medications:  Current Outpatient Prescriptions   Medication Sig Dispense Refill      augmented betamethasone dipropionate (DIPROLENE-AF) 0.05 % ointment Apply to affected areas of hand twice daily, everyday. Use Vaniply on top of this medication. Occlude with gloves at night. 50 g 3     betamethasone dipropionate (DIPROSONE) 0.05 % ointment Apply topically 2 times daily 50 g 1     BuPROPion HCl (WELLBUTRIN PO)        Calcium carb-Vitamin D 500 mg [place]-200 units (OSCAL WITH D;OYSTER SHELL CALCIUM) 500-200 MG-UNIT per tablet Take 1 tablet by mouth daily       clobetasol (TEMOVATE) 0.05 % ointment Apply thin layer twice daily to hands and feet.  Soak hands before application at bedtime. 60 g 11     doxycycline (VIBRAMYCIN) 100 MG capsule Take 1 capsule (100 mg) by mouth 2 times daily 28 capsule 0     Dupilumab (DUPIXENT) 300 MG/2ML syringe Inject 2 mLs (300 mg) Subcutaneous every 14 days 2 mL 4     FLUOXETINE HCL PO        methocarbamol (ROBAXIN) 500 MG tablet Take 500 mg by mouth       methylPREDNISolone (MEDROL DOSEPAK) 4 MG tablet Follow package instructions 21 tablet 0     naproxen (NAPROSYN) 500 MG tablet Take 500 mg by mouth       predniSONE (DELTASONE) 1 MG tablet Take 8 tablets (8 mg) by mouth daily (Patient not taking: Reported on 4/30/2018) 240 tablet 3     predniSONE (DELTASONE) 10 MG tablet Take 1 tablet (10 mg) by mouth daily (Patient not taking: Reported on 4/30/2018) 7 tablet 0     predniSONE (DELTASONE) 10 MG tablet Take 40mg [id number] then [id number],, then 20mg qday until seen. (Patient not taking: Reported on 4/30/2018) 80 tablet 0     predniSONE (DELTASONE) 5 MG tablet 8,8,7,7,6,6,5,5,4,4,3,3,2,2,1,1, (Patient not taking: Reported on 4/30/2018) 72 tablet 0     predniSONE (DELTASONE) 5 MG tablet Take 1 tablet (5 mg) by mouth daily (Patient not taking: Reported on 4/30/2018) 30 tablet 3     tacrolimus (PROTOPIC) 0.1 % ointment Apply topically 2 times daily 60 g 0        Allergies   Allergen Reactions     Sulfadiazine      Other reaction(s): Hives/ Urticaria - Allergy     Tramadol       Other reaction(s): Shortness Of Breath/Wheezing/Chest Tightness - Allergy     Atorvastatin      Other reaction(s): Rash - Allergy     Bronopol Other (See Comments)     Previous Positive (+) skin patch test     Bacitracin Rash     Patch testing     Glutaraldehyde Rash     2+ patch testing     Gold Rash     Mild patch test reaction     Insulin Detemir      Other reaction(s): Headaches/ Migraines - Intolerance     Lisinopril      Other reaction(s): Headaches/ Migraines - Intolerance     Penicillins Rash     Sulfa Drugs Rash         Review of Systems:  -As per HPI  -Constitutional: The patient denies fatigue, fevers, chills, unintended weight loss, and night sweats.  -HEENT: Patient denies nonhealing oral sores.  -Skin: As above in HPI. No additional skin concerns.    Physical exam:  Vitals: /79  Pulse 68  GEN: This is a well developed, well-nourished female in no acute distress, in a pleasant mood.    SKIN: Focused examination of the palms and soles was performed.  -Hyperkeratosis of palms and soles of feet, some small fissuring, minimal erythema, tender to palpation near fissures, as expected.  ==> hand and foot dermatosis worse than some weeks ago.     Impression/Plan:  1. Chronic hand and foot eczema 2/2 toxic irritant vs allergic vs atopic vs psoriaform  --> unlikely fungal (no response to Itraconazole)    Will arrange for repeat patch test in 3-4 weeks, given recent oral steroid use: standard + emollient + preservative series    Will need to confirm if insurance will cover    10% OTC urea cream to palms and soles of feet and Vaseline and uses betamethasone ointment ==> add protopic ointment    Dupilumab is not covered by her insurance    If allergic component cannot be found, radiation therapy will be the next option    Fungal cultures obtained ==> in culture Sagrahamala species isolated (contaminant?) ==> try Itraconazole 100mg for 2 weeks daily  --> 2 weeks of Itraconazole did not make much  difference, therefore fungal origin unlikely    Patient also was seen and discussed with Dr. Zuniga to confirm the above plan      Order for PATCH TESTS      [] Inpatient / [place]....   Bed ....  [x] Outpatient    Skin Atopy           :       [x] Yes   [] No  Rhinitis/Sinusitis :       [x] Yes   [] No  Allergic Asthma   :       [] Yes   [] No  Leg ulcers            :       [] Yes   [x] No  Hand eczema       :       [x] Yes   [] No    Leading hand :  [] R [] L               [] Ambidextrous                      Reason for tests (Suspected allergy): contact allergy on hands/feet or Psoriasis  Known previous allergies: ...    [] Standardized panels  [x] Standard panel (40 tests)  [x] Preservatives & Antimicrobials (31 tests)  [x] Emulsifiers & Additives (25 tests)   [] Perfumes/Flavours & Plants (25 tests)  [] Hairdresser panel (12 tests)  [] Rubber Chemicals (22 tests)  [] Plastics (26 tests)  [x] Colorants/Dyes/Food additives (20 tests)  [] Metals (implants/dental) (23 tests)  [] Local anaesthetics/NSAIDs (12 tests)  [] Antibiotics & Antimycotics (14 tests)   [x] Corticosteroids (15 tests)   [] Photopatch test (32 tests)   [] others: ...    [] Patient's own products: ...    DO NOT test if chemical or biological identity is unknown!     always ask from patient the product information and safety sheets (MSDS)     [] Patient needs consultation with Allergy team  [x] Tests discussed with Allergy team (can have direct appointment for patch tests)    ==> put on back the patch tests on 07/23/2018 (added Colorants and dyes series --> patient thinks that dyes in money might play a role)  --> 1st readings on 07/25/2018  --> 2nd readings on 07/27/2018 (maybe put some dollar bill piece on the skin later)    RESULTS & EVALUATION of PATCH TESTS      Patch test readings after     [] 2 days, [] 3 days [] 4 days, [] 5 days,   Other duration: ...  [] No relevant allergic reaction observed    [] Allergic reaction diagnosed against:  ......      Interpretation/ remarks:   ...    [] Patient information given   [] ACSD information   [] [employer] information    [] Treatment prescribed: patient had all different treatment options tried, except PUVA or radiotherapy.

## 2018-07-23 NOTE — LETTER
7/23/2018       RE: Kasey Ibrahim  [address] [facility] [id number]     Dear Colleague,    Thank you for referring your patient, Kasey Ibrahim, to the [facility] DERMATOLOGY at [facility]. Please see a copy of my visit note below.    [facility] Dermatology Note      Dermatology Problem List:  1.Chronic hand and foot eczema 2/2 toxic irritant vs allergic vs atopic psoriaform    Encounter Date: Jul 23, 2018    CC:   Chief Complaint   Patient presents with     Derm Problem     Kasey is her for patch testing day 1.          History of Present Illness:  Ms. Kasey Ibrahim is a 68 year old female who presents with chronic hand and foot eczema for past 4.5 years.  She has tried and failed: nb uvb, MMF, acitretin, methotrexate (all prior to presenting at the ), minocycline ( 8/2016-3/20/17), prednisone, clobetasol 0.05% ointment, CSA 3mg/kg/day. She just finished a medrol dose pack, which helped.  She had previous allergy patch test in 2013 where she was only found to have +1 to gold and bacitracin, in other words, negative patch test. (See [facility] records from [facility]).  No personal or family hx of psoriasis or hay fever.  She uses T gel for posterior scalp itching, which helps.      Past Medical History:   Patient Active Problem List   Diagnosis     Cholangitis     Past Medical History:   Diagnosis Date     Depression      Hand dermatitis      Past Surgical History:   Procedure Laterality Date     FOOT SURGERY       PHACOEMULSIFICATION WITH STANDARD INTRAOCULAR LENS IMPLANT Left 7/3/2017    Procedure: PHACOEMULSIFICATION WITH STANDARD INTRAOCULAR LENS IMPLANT;  Left cataract removal with implant;  Surgeon: Dominguez Payne MD;  Location: [facility] OR     PHACOEMULSIFICATION WITH STANDARD INTRAOCULAR LENS IMPLANT Right 7/24/2017    Procedure: PHACOEMULSIFICATION WITH STANDARD INTRAOCULAR LENS IMPLANT;  Right cataract removal with implant;   Surgeon: Dominguez Payne MD;  Location: [facility] OR       Social History:  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     Family History:  No family hx of atopic dermatitis, hay fever, psoriasis    Medications:  Current Outpatient Prescriptions   Medication Sig Dispense Refill     augmented betamethasone dipropionate (DIPROLENE-AF) 0.05 % ointment Apply to affected areas of hand twice daily, everyday. Use Vaniply on top of this medication. Occlude with gloves at night. 50 g 3     betamethasone dipropionate (DIPROSONE) 0.05 % ointment Apply topically 2 times daily 50 g 1     BuPROPion HCl (WELLBUTRIN PO)        Calcium carb-Vitamin D 500 mg [place]-200 units (OSCAL WITH D;OYSTER SHELL CALCIUM) 500-200 MG-UNIT per tablet Take 1 tablet by mouth daily       clobetasol (TEMOVATE) 0.05 % ointment Apply thin layer twice daily to hands and feet.  Soak hands before application at bedtime. 60 g 11     doxycycline (VIBRAMYCIN) 100 MG capsule Take 1 capsule (100 mg) by mouth 2 times daily 28 capsule 0     Dupilumab (DUPIXENT) 300 MG/2ML syringe Inject 2 mLs (300 mg) Subcutaneous every 14 days 2 mL 4     FLUOXETINE HCL PO        methocarbamol (ROBAXIN) 500 MG tablet Take 500 mg by mouth       methylPREDNISolone (MEDROL DOSEPAK) 4 MG tablet Follow package instructions 21 tablet 0     naproxen (NAPROSYN) 500 MG tablet Take 500 mg by mouth       predniSONE (DELTASONE) 1 MG tablet Take 8 tablets (8 mg) by mouth daily (Patient not taking: Reported on 4/30/2018) 240 tablet 3     predniSONE (DELTASONE) 10 MG tablet Take 1 tablet (10 mg) by mouth daily (Patient not taking: Reported on 4/30/2018) 7 tablet 0     predniSONE (DELTASONE) 10 MG tablet Take 40mg [id number] then [id number],, then 20mg qday until seen. (Patient not taking: Reported on 4/30/2018) 80 tablet 0     predniSONE (DELTASONE) 5 MG tablet 8,8,7,7,6,6,5,5,4,4,3,3,2,2,1,1, (Patient not taking: Reported on 4/30/2018) 72 tablet 0     predniSONE (DELTASONE) 5 MG tablet  Take 1 tablet (5 mg) by mouth daily (Patient not taking: Reported on 4/30/2018) 30 tablet 3     tacrolimus (PROTOPIC) 0.1 % ointment Apply topically 2 times daily 60 g 0        Allergies   Allergen Reactions     Sulfadiazine      Other reaction(s): Hives/ Urticaria - Allergy     Tramadol      Other reaction(s): Shortness Of Breath/Wheezing/Chest Tightness - Allergy     Atorvastatin      Other reaction(s): Rash - Allergy     Bronopol Other (See Comments)     Previous Positive (+) skin patch test     Bacitracin Rash     Patch testing     Glutaraldehyde Rash     2+ patch testing     Gold Rash     Mild patch test reaction     Insulin Detemir      Other reaction(s): Headaches/ Migraines - Intolerance     Lisinopril      Other reaction(s): Headaches/ Migraines - Intolerance     Penicillins Rash     Sulfa Drugs Rash         Review of Systems:  -As per HPI  -Constitutional: The patient denies fatigue, fevers, chills, unintended weight loss, and night sweats.  -HEENT: Patient denies nonhealing oral sores.  -Skin: As above in HPI. No additional skin concerns.    Physical exam:  Vitals: /79  Pulse 68  GEN: This is a well developed, well-nourished female in no acute distress, in a pleasant mood.    SKIN: Focused examination of the palms and soles was performed.  -Hyperkeratosis of palms and soles of feet, some small fissuring, minimal erythema, tender to palpation near fissures, as expected.  ==> hand and foot dermatosis worse than some weeks ago.     Impression/Plan:  1. Chronic hand and foot eczema 2/2 toxic irritant vs allergic vs atopic vs psoriaform  --> unlikely fungal (no response to Itraconazole)    Will arrange for repeat patch test in 3-4 weeks, given recent oral steroid use: standard + emollient + preservative series    Will need to confirm if insurance will cover    10% OTC urea cream to palms and soles of feet and Vaseline and uses betamethasone ointment ==> add protopic ointment    Dupilumab is not covered  by her insurance    If allergic component cannot be found, radiation therapy will be the next option    Fungal cultures obtained ==> in culture Danieltrisha species isolated (contaminant?) ==> try Itraconazole 100mg for 2 weeks daily  --> 2 weeks of Itraconazole did not make much difference, therefore fungal origin unlikely    Patient also was seen and discussed with Dr. Zuniga to confirm the above plan      Order for PATCH TESTS      [] Inpatient / [place]....   Bed ....  [x] Outpatient    Skin Atopy           :       [x] Yes   [] No  Rhinitis/Sinusitis :       [x] Yes   [] No  Allergic Asthma   :       [] Yes   [] No  Leg ulcers            :       [] Yes   [x] No  Hand eczema       :       [x] Yes   [] No    Leading hand :  [] R [] L               [] Ambidextrous                      Reason for tests (Suspected allergy): contact allergy on hands/feet or Psoriasis  Known previous allergies: ...    [] Standardized panels  [x] Standard panel (40 tests)  [x] Preservatives & Antimicrobials (31 tests)  [x] Emulsifiers & Additives (25 tests)   [] Perfumes/Flavours & Plants (25 tests)  [] Hairdresser panel (12 tests)  [] Rubber Chemicals (22 tests)  [] Plastics (26 tests)  [x] Colorants/Dyes/Food additives (20 tests)  [] Metals (implants/dental) (23 tests)  [] Local anaesthetics/NSAIDs (12 tests)  [] Antibiotics & Antimycotics (14 tests)   [x] Corticosteroids (15 tests)   [] Photopatch test (32 tests)   [] others: ...    [] Patient's own products: ...    DO NOT test if chemical or biological identity is unknown!     always ask from patient the product information and safety sheets (MSDS)     [] Patient needs consultation with Allergy team  [x] Tests discussed with Allergy team (can have direct appointment for patch tests)    ==> put on back the patch tests on 07/23/2018 (added Colorants and dyes series --> patient thinks that dyes in money might play a role)  --> 1st readings on 07/25/2018  --> 2nd readings on 07/27/2018  (maybe put some dollar bill piece on the skin later)    RESULTS & EVALUATION of PATCH TESTS      Patch test readings after     [] 2 days, [] 3 days [] 4 days, [] 5 days,   Other duration: ...  [] No relevant allergic reaction observed    [] Allergic reaction diagnosed against: ......      Interpretation/ remarks:   ...    [] Patient information given   [] ACSD information   [] SmartPractice information    [] Treatment prescribed: patient had all different treatment options tried, except PUVA or radiotherapy.        Again, thank you for allowing me to participate in the care of your patient.      Sincerely,    Lester Aranda MD

## 2018-07-25 ENCOUNTER — OFFICE VISIT (OUTPATIENT)
Dept: DERMATOLOGY | Facility: CLINIC | Age: 68
End: 2018-07-25
Payer: MEDICARE

## 2018-07-25 DIAGNOSIS — L23.89 ALLERGIC CONTACT DERMATITIS DUE TO OTHER AGENTS: Primary | ICD-10-CM

## 2018-07-25 ASSESSMENT — PAIN SCALES - GENERAL: PAINLEVEL: NO PAIN (0)

## 2018-07-25 NOTE — NURSING NOTE
Dermatology Rooming Note    Amarilis Ibrahim's goals for this visit include:   Chief Complaint   Patient presents with     Allergic Reaction     amarilis is here for patch testing day 3     Regine Yancey LPN

## 2018-07-25 NOTE — PROGRESS NOTES
[facility] Dermatology Note      Dermatology Problem List:  1.Chronic hand and foot eczema 2/2 toxic irritant vs allergic vs atopic psoriaform    Encounter Date: Jul 25, 2018    CC:   No chief complaint on file.        History of Present Illness:  Ms. Kasey Ibrahim is a 68 year old female who presents with chronic hand and foot eczema for past 4.5 years.  She has tried and failed: nb uvb, MMF, acitretin, methotrexate (all prior to presenting at the ), minocycline ( 8/2016-3/20/17), prednisone, clobetasol 0.05% ointment, CSA 3mg/kg/day. She just finished a medrol dose pack, which helped.  She had previous allergy patch test in 2013 where she was only found to have +1 to gold and bacitracin, in other words, negative patch test. (See [facility] records from [facility]).  No personal or family hx of psoriasis or hay fever.  She uses T gel for posterior scalp itching, which helps.      Past Medical History:   Patient Active Problem List   Diagnosis     Cholangitis     Past Medical History:   Diagnosis Date     Depression      Hand dermatitis      Past Surgical History:   Procedure Laterality Date     FOOT SURGERY       PHACOEMULSIFICATION WITH STANDARD INTRAOCULAR LENS IMPLANT Left 7/3/2017    Procedure: PHACOEMULSIFICATION WITH STANDARD INTRAOCULAR LENS IMPLANT;  Left cataract removal with implant;  Surgeon: Dominguez Payne MD;  Location: [facility] OR     PHACOEMULSIFICATION WITH STANDARD INTRAOCULAR LENS IMPLANT Right 7/24/2017    Procedure: PHACOEMULSIFICATION WITH STANDARD INTRAOCULAR LENS IMPLANT;  Right cataract removal with implant;  Surgeon: Dominguez Payne MD;  Location: [facility] OR       Social History:  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     Family History:  No family hx of atopic dermatitis, hay fever, psoriasis    Medications:  Current Outpatient Prescriptions   Medication Sig Dispense Refill     augmented betamethasone dipropionate (DIPROLENE-AF) 0.05 % ointment Apply to  affected areas of hand twice daily, everyday. Use Vaniply on top of this medication. Occlude with gloves at night. 50 g 3     betamethasone dipropionate (DIPROSONE) 0.05 % ointment Apply topically 2 times daily 50 g 1     BuPROPion HCl (WELLBUTRIN PO)        Calcium carb-Vitamin D 500 mg [place]-200 units (OSCAL WITH D;OYSTER SHELL CALCIUM) 500-200 MG-UNIT per tablet Take 1 tablet by mouth daily       clobetasol (TEMOVATE) 0.05 % ointment Apply thin layer twice daily to hands and feet.  Soak hands before application at bedtime. 60 g 11     doxycycline (VIBRAMYCIN) 100 MG capsule Take 1 capsule (100 mg) by mouth 2 times daily 28 capsule 0     Dupilumab (DUPIXENT) 300 MG/2ML syringe Inject 2 mLs (300 mg) Subcutaneous every 14 days 2 mL 4     FLUOXETINE HCL PO        methocarbamol (ROBAXIN) 500 MG tablet Take 500 mg by mouth       methylPREDNISolone (MEDROL DOSEPAK) 4 MG tablet Follow package instructions 21 tablet 0     naproxen (NAPROSYN) 500 MG tablet Take 500 mg by mouth       predniSONE (DELTASONE) 1 MG tablet Take 8 tablets (8 mg) by mouth daily (Patient not taking: Reported on 4/30/2018) 240 tablet 3     predniSONE (DELTASONE) 10 MG tablet Take 1 tablet (10 mg) by mouth daily (Patient not taking: Reported on 4/30/2018) 7 tablet 0     predniSONE (DELTASONE) 10 MG tablet Take 40mg [id number] then [id number],, then 20mg qday until seen. (Patient not taking: Reported on 4/30/2018) 80 tablet 0     predniSONE (DELTASONE) 5 MG tablet 8,8,7,7,6,6,5,5,4,4,3,3,2,2,1,1, (Patient not taking: Reported on 4/30/2018) 72 tablet 0     predniSONE (DELTASONE) 5 MG tablet Take 1 tablet (5 mg) by mouth daily (Patient not taking: Reported on 4/30/2018) 30 tablet 3     tacrolimus (PROTOPIC) 0.1 % ointment Apply topically 2 times daily 60 g 0        Allergies   Allergen Reactions     Sulfadiazine      Other reaction(s): Hives/ Urticaria - Allergy     Tramadol      Other reaction(s): Shortness Of Breath/Wheezing/Chest Tightness -  Allergy     Atorvastatin      Other reaction(s): Rash - Allergy     Bronopol Other (See Comments)     Previous Positive (+) skin patch test     Bacitracin Rash     Patch testing     Glutaraldehyde Rash     2+ patch testing     Gold Rash     Mild patch test reaction     Insulin Detemir      Other reaction(s): Headaches/ Migraines - Intolerance     Lisinopril      Other reaction(s): Headaches/ Migraines - Intolerance     Penicillins Rash     Sulfa Drugs Rash         Review of Systems:  -As per HPI  -Constitutional: The patient denies fatigue, fevers, chills, unintended weight loss, and night sweats.  -HEENT: Patient denies nonhealing oral sores.  -Skin: As above in HPI. No additional skin concerns.    Physical exam:  Vitals: There were no vitals taken for this visit.  GEN: This is a well developed, well-nourished female in no acute distress, in a pleasant mood.    SKIN: Focused examination of the palms and soles was performed.  -Hyperkeratosis of palms and soles of feet, some small fissuring, minimal erythema, tender to palpation near fissures, as expected.  ==> hand and foot dermatosis worse than some weeks ago.     Impression/Plan:  1. Chronic hand and foot eczema 2/2 toxic irritant vs allergic vs atopic vs psoriaform  --> unlikely fungal (no response to Itraconazole)    Will arrange for repeat patch test in 3-4 weeks, given recent oral steroid use: standard + emollient + preservative series    Will need to confirm if insurance will cover    10% OTC urea cream to palms and soles of feet and Vaseline and uses betamethasone ointment ==> add protopic ointment    Dupilumab is not covered by her insurance    If allergic component cannot be found, radiation therapy will be the next option    Fungal cultures obtained ==> in culture Sagrahamala species isolated (contaminant?) ==> try Itraconazole 100mg for 2 weeks daily  --> 2 weeks of Itraconazole did not make much difference, therefore fungal origin unlikely    Patient  also was seen and discussed with Dr. Zuniga to confirm the above plan      Order for PATCH TESTS      [] Inpatient / [place]....   Bed ....  [x] Outpatient    Skin Atopy           :       [x] Yes   [] No  Rhinitis/Sinusitis :       [x] Yes   [] No  Allergic Asthma   :       [] Yes   [] No  Leg ulcers            :       [] Yes   [x] No  Hand eczema       :       [x] Yes   [] No    Leading hand :  [] R [] L               [] Ambidextrous                      Reason for tests (Suspected allergy): contact allergy on hands/feet or Psoriasis  Known previous allergies: ...    [] Standardized panels  [x] Standard panel (40 tests)  [x] Preservatives & Antimicrobials (31 tests)  [x] Emulsifiers & Additives (25 tests)   [] Perfumes/Flavours & Plants (25 tests)  [] Hairdresser panel (12 tests)  [] Rubber Chemicals (22 tests)  [] Plastics (26 tests)  [x] Colorants/Dyes/Food additives (20 tests)  [] Metals (implants/dental) (23 tests)  [] Local anaesthetics/NSAIDs (12 tests)  [] Antibiotics & Antimycotics (14 tests)   [x] Corticosteroids (15 tests)   [] Photopatch test (32 tests)   [] others: ...    [] Patient's own products: ...    DO NOT test if chemical or biological identity is unknown!     always ask from patient the product information and safety sheets (MSDS)     [] Patient needs consultation with Allergy team  [x] Tests discussed with Allergy team (can have direct appointment for patch tests)    ==> put on back the patch tests on 07/23/2018 (added Colorants and dyes series --> patient thinks that dyes in money might play a role)  --> 1st readings on 07/25/2018: all tests were well sticking to the end and no skin reaction in any of the test fields. However, some itching over the corticosteroid series  --> 2nd readings on 07/27/2018 (maybe put some dollar bill piece on the skin later)    RESULTS & EVALUATION of PATCH TESTS      Patch test readings after     [x] 2 days, [] 3 days [] 4 days, [] 5 days,   Other duration:  ...  [x] No relevant allergic reaction observed    [] Allergic reaction diagnosed against: ......      Interpretation/ remarks:   ...    [] Patient information given   [] ACSD information   [] SmartPractice information    [] Treatment prescribed: patient had all different treatment options tried, except PUVA or radiotherapy.

## 2018-07-25 NOTE — LETTER
7/25/2018       RE: Kasey Ibrahim  [address] [facility] [address]     Dear Colleague,    Thank you for referring your patient, Kasey Ibrahim, to the [facility] DERMATOLOGY at [facility]. Please see a copy of my visit note below.    [facility] Dermatology Note      Dermatology Problem List:  1.Chronic hand and foot eczema 2/2 toxic irritant vs allergic vs atopic psoriaform    Encounter Date: Jul 25, 2018    CC:   No chief complaint on file.        History of Present Illness:  Ms. Kasey Ibrahim is a 68 year old female who presents with chronic hand and foot eczema for past 4.5 years.  She has tried and failed: nb uvb, MMF, acitretin, methotrexate (all prior to presenting at the ), minocycline ( 8/2016-3/20/17), prednisone, clobetasol 0.05% ointment, CSA 3mg/kg/day. She just finished a medrol dose pack, which helped.  She had previous allergy patch test in 2013 where she was only found to have +1 to gold and bacitracin, in other words, negative patch test. (See [facility]  No personal or family hx of psoriasis or hay fever.  She uses T gel for posterior scalp itching, which helps.      Past Medical History:   Patient Active Problem List   Diagnosis     Cholangitis     Past Medical History:   Diagnosis Date     Depression      Hand dermatitis      Past Surgical History:   Procedure Laterality Date     FOOT SURGERY       PHACOEMULSIFICATION WITH STANDARD INTRAOCULAR LENS IMPLANT Left 7/3/2017    Procedure: PHACOEMULSIFICATION WITH STANDARD INTRAOCULAR LENS IMPLANT;  Left cataract removal with implant;  Surgeon: Dominguez Payne MD;  Location: [facility] OR     PHACOEMULSIFICATION WITH STANDARD INTRAOCULAR LENS IMPLANT Right 7/24/2017    Procedure: PHACOEMULSIFICATION WITH STANDARD INTRAOCULAR LENS IMPLANT;  Right cataract removal with implant;  Surgeon: Dominguez Payne MD;  Location: [facility] OR       Social  History:  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     Family History:  No family hx of atopic dermatitis, hay fever, psoriasis    Medications:  Current Outpatient Prescriptions   Medication Sig Dispense Refill     augmented betamethasone dipropionate (DIPROLENE-AF) 0.05 % ointment Apply to affected areas of hand twice daily, everyday. Use Vaniply on top of this medication. Occlude with gloves at night. 50 g 3     betamethasone dipropionate (DIPROSONE) 0.05 % ointment Apply topically 2 times daily 50 g 1     BuPROPion HCl (WELLBUTRIN PO)        Calcium carb-Vitamin D 500 mg [place]-200 units (OSCAL WITH D;OYSTER SHELL CALCIUM) 500-200 MG-UNIT per tablet Take 1 tablet by mouth daily       clobetasol (TEMOVATE) 0.05 % ointment Apply thin layer twice daily to hands and feet.  Soak hands before application at bedtime. 60 g 11     doxycycline (VIBRAMYCIN) 100 MG capsule Take 1 capsule (100 mg) by mouth 2 times daily 28 capsule 0     Dupilumab (DUPIXENT) 300 MG/2ML syringe Inject 2 mLs (300 mg) Subcutaneous every 14 days 2 mL 4     FLUOXETINE HCL PO        methocarbamol (ROBAXIN) 500 MG tablet Take 500 mg by mouth       methylPREDNISolone (MEDROL DOSEPAK) 4 MG tablet Follow package instructions 21 tablet 0     naproxen (NAPROSYN) 500 MG tablet Take 500 mg by mouth       predniSONE (DELTASONE) 1 MG tablet Take 8 tablets (8 mg) by mouth daily (Patient not taking: Reported on 4/30/2018) 240 tablet 3     predniSONE (DELTASONE) 10 MG tablet Take 1 tablet (10 mg) by mouth daily (Patient not taking: Reported on 4/30/2018) 7 tablet 0     predniSONE (DELTASONE) 10 MG tablet Take 40mg [id number] then [id number],, then 20mg qday until seen. (Patient not taking: Reported on 4/30/2018) 80 tablet 0     predniSONE (DELTASONE) 5 MG tablet 8,8,7,7,6,6,5,5,4,4,3,3,2,2,1,1, (Patient not taking: Reported on 4/30/2018) 72 tablet 0     predniSONE (DELTASONE) 5 MG tablet Take 1 tablet (5 mg) by mouth daily (Patient not taking: Reported on  4/30/2018) 30 tablet 3     tacrolimus (PROTOPIC) 0.1 % ointment Apply topically 2 times daily 60 g 0        Allergies   Allergen Reactions     Sulfadiazine      Other reaction(s): Hives/ Urticaria - Allergy     Tramadol      Other reaction(s): Shortness Of Breath/Wheezing/Chest Tightness - Allergy     Atorvastatin      Other reaction(s): Rash - Allergy     Bronopol Other (See Comments)     Previous Positive (+) skin patch test     Bacitracin Rash     Patch testing     Glutaraldehyde Rash     2+ patch testing     Gold Rash     Mild patch test reaction     Insulin Detemir      Other reaction(s): Headaches/ Migraines - Intolerance     Lisinopril      Other reaction(s): Headaches/ Migraines - Intolerance     Penicillins Rash     Sulfa Drugs Rash         Review of Systems:  -As per HPI  -Constitutional: The patient denies fatigue, fevers, chills, unintended weight loss, and night sweats.  -HEENT: Patient denies nonhealing oral sores.  -Skin: As above in HPI. No additional skin concerns.    Physical exam:  Vitals: There were no vitals taken for this visit.  GEN: This is a well developed, well-nourished female in no acute distress, in a pleasant mood.    SKIN: Focused examination of the palms and soles was performed.  -Hyperkeratosis of palms and soles of feet, some small fissuring, minimal erythema, tender to palpation near fissures, as expected.  ==> hand and foot dermatosis worse than some weeks ago.     Impression/Plan:  1. Chronic hand and foot eczema 2/2 toxic irritant vs allergic vs atopic vs psoriaform  --> unlikely fungal (no response to Itraconazole)    Will arrange for repeat patch test in 3-4 weeks, given recent oral steroid use: standard + emollient + preservative series    Will need to confirm if insurance will cover    10% OTC urea cream to palms and soles of feet and Vaseline and uses betamethasone ointment ==> add protopic ointment    Dupilumab is not covered by her insurance    If allergic component  cannot be found, radiation therapy will be the next option    Fungal cultures obtained ==> in culture Mahamed species isolated (contaminant?) ==> try Itraconazole 100mg for 2 weeks daily  --> 2 weeks of Itraconazole did not make much difference, therefore fungal origin unlikely    Patient also was seen and discussed with Dr. Zuniga to confirm the above plan      Order for PATCH TESTS      [] Inpatient / [place]....   Bed ....  [x] Outpatient    Skin Atopy           :       [x] Yes   [] No  Rhinitis/Sinusitis :       [x] Yes   [] No  Allergic Asthma   :       [] Yes   [] No  Leg ulcers            :       [] Yes   [x] No  Hand eczema       :       [x] Yes   [] No    Leading hand :  [] R [] L               [] Ambidextrous                      Reason for tests (Suspected allergy): contact allergy on hands/feet or Psoriasis  Known previous allergies: ...    [] Standardized panels  [x] Standard panel (40 tests)  [x] Preservatives & Antimicrobials (31 tests)  [x] Emulsifiers & Additives (25 tests)   [] Perfumes/Flavours & Plants (25 tests)  [] Hairdresser panel (12 tests)  [] Rubber Chemicals (22 tests)  [] Plastics (26 tests)  [x] Colorants/Dyes/Food additives (20 tests)  [] Metals (implants/dental) (23 tests)  [] Local anaesthetics/NSAIDs (12 tests)  [] Antibiotics & Antimycotics (14 tests)   [x] Corticosteroids (15 tests)   [] Photopatch test (32 tests)   [] others: ...    [] Patient's own products: ...    DO NOT test if chemical or biological identity is unknown!     always ask from patient the product information and safety sheets (MSDS)     [] Patient needs consultation with Allergy team  [x] Tests discussed with Allergy team (can have direct appointment for patch tests)    ==> put on back the patch tests on 07/23/2018 (added Colorants and dyes series --> patient thinks that dyes in money might play a role)  --> 1st readings on 07/25/2018: all tests were well sticking to the end and no skin reaction in any of the  test fields. However, some itching over the corticosteroid series  --> 2nd readings on 07/27/2018 (maybe put some dollar bill piece on the skin later)    RESULTS & EVALUATION of PATCH TESTS      Patch test readings after     [x] 2 days, [] 3 days [] 4 days, [] 5 days,   Other duration: ...  [x] No relevant allergic reaction observed    [] Allergic reaction diagnosed against: ......      Interpretation/ remarks:   ...    [] Patient information given   [] ACSD information   [] SmartP[employer] information    [] Treatment prescribed: patient had all different treatment options tried, except PUVA or radiotherapy.        Again, thank you for allowing me to participate in the care of your patient.      Sincerely,    Lester Aranda MD

## 2018-07-25 NOTE — MR AVS SNAPSHOT
After Visit Summary   7/25/2018    Kasey Ibrahim    MRN: [id number]           Patient Information     Date Of Birth          1950        Visit Information        Provider Department      7/25/2018 2:45 PM Lester Aranda MD [facility] Dermatology        Today's Diagnoses     Allergic contact dermatitis due to other agents    -  1       Follow-ups after your visit        Your next 10 appointments already scheduled     Jul 27, 2018  2:30 PM CDT   (Arrive by 2:15 PM)   Return Allergy with MD CLARICE Delgado [facility] Dermatology ([facility])    [address] [place] [address] [phone]              Who to contact     Please call your clinic at [phone] to:    Ask questions about your health    Make or cancel appointments    Discuss your medicines    Learn about your test results    Speak to your doctor            Additional Information About Your Visit        MyChart Information     [employer] gives you secure access to your electronic health record. If you see a primary care provider, you can also send messages to your care team and make appointments. If you have questions, please call your primary care clinic.  If you do not have a primary care provider, please call [phone] and they will assist you.      [employer] is an electronic gateway that provides easy, online access to your medical records. With [employer], you can request a clinic appointment, read your test results, renew a prescription or communicate with your care team.     To access your existing account, please contact your [facility] or call [phone] for assistance.        Care EveryWhere ID     This is your Care EveryWhere ID. This could be used by other organizations to access your [place] medical records  [id number]         Blood Pressure from Last 3 Encounters:   07/23/18 126/79   07/24/17 114/63   07/03/17 115/59    Weight from Last  3 Encounters:   07/24/17 78.9 kg (174 lb)   07/03/17 77.1 kg (170 lb)   03/20/17 80.6 kg (177 lb 12.8 oz)              Today, you had the following     No orders found for display       Primary Care Provider Office Phone # Fax #    Ankit Dominguez Martini [phone]       [facility] [address] [facility] [address]        Equal Access to Services     LISA BALLARD : Hadii aad ku hadasho Soomaali, waaxda luqadaha, qaybta kaalmada adeegyada, waxay idiin hayaan adeeg conor dc . So [facility] [phone].    ATENCIÓN: Si michelle enriquez, tiene a young disposición servicios gratuitos de asistencia lingüística. Ll[facility] [phone].    We comply with applicable federal civil rights laws and [place] laws. We do not discriminate on the basis of race, color, national origin, age, disability, sex, sexual orientation, or gender identity.            Thank you!     Thank you for choosing [facility] DERMATOLOGY  for your care. Our goal is always to provide you with excellent care. Hearing back from our patients is one way we can continue to improve our services. Please take a few minutes to complete the written survey that you may receive in the mail after your visit with us. Thank you!             Your Updated Medication List - Protect others around you: Learn how to safely use, store and throw away your medicines at www.disposemymeds.org.          This list is accurate as of 7/25/18  4:16 PM.  Always use your most recent med list.                   Brand Name Dispense Instructions for use Diagnosis    augmented betamethasone dipropionate 0.05 % ointment    DIPROLENE-AF    50 g    Apply to affected areas of hand twice daily, everyday. Use Vaniply on top of this medication. Occlude with gloves at night.    Allergic contact dermatitis due to drugs in contact with skin       betamethasone dipropionate 0.05 % ointment    DIPROSONE    50 g    Apply topically 2 times daily    Contact dermatitis due to  other agent, unspecified contact dermatitis type       Calcium carb-Vitamin D 500 mg [place]-200 units 500-200 MG-UNIT per tablet    OSCAL with D;Oyster Shell Calcium     Take 1 tablet by mouth daily        clobetasol 0.05 % ointment    TEMOVATE    60 g    Apply thin layer twice daily to hands and feet.  Soak hands before application at bedtime.    Allergic contact dermatitis, unspecified trigger, Chronic dermatitis of hands       doxycycline 100 MG capsule    VIBRAMYCIN    28 capsule    Take 1 capsule (100 mg) by mouth 2 times daily    Staphylococcal infection, Foot dermatitis, Hand dermatitis       Dupilumab 300 MG/2ML syringe    DUPIXENT    2 mL    Inject 2 mLs (300 mg) Subcutaneous every 14 days    Hand dermatitis, Foot dermatitis       FLUOXETINE HCL PO           methocarbamol 500 MG tablet    ROBAXIN     Take 500 mg by mouth        methylPREDNISolone 4 MG tablet    MEDROL DOSEPAK    21 tablet    Follow package instructions    Chronic dermatitis of hands       naproxen 500 MG tablet    NAPROSYN     Take 500 mg by mouth        * predniSONE 10 MG tablet    DELTASONE    80 tablet    Take 40mg [id number] then [id number],, then 20mg qday until seen.    Chronic dermatitis of hands, Allergic contact dermatitis, unspecified trigger       * predniSONE 5 MG tablet    DELTASONE    30 tablet    Take 1 tablet (5 mg) by mouth daily    Allergic contact dermatitis, unspecified trigger       * predniSONE 1 MG tablet    DELTASONE    240 tablet    Take 8 tablets (8 mg) by mouth daily    Allergic contact dermatitis due to drugs in contact with skin       * predniSONE 10 MG tablet    DELTASONE    7 tablet    Take 1 tablet (10 mg) by mouth daily    Allergic contact dermatitis, unspecified trigger       * predniSONE 5 MG tablet    DELTASONE    72 tablet    8,8,7,7,6,6,5,5,4,4,3,3,2,2,1,1,    Allergic contact dermatitis, unspecified trigger       tacrolimus 0.1 % ointment    PROTOPIC    60 g    Apply topically 2 times daily     Contact dermatitis due to other agent, unspecified contact dermatitis type       WELLBUTRIN PO           * Notice:  This list has 5 medication(s) that are the same as other medications prescribed for you. Read the directions carefully, and ask your doctor or other care provider to review them with you.

## 2018-07-27 ENCOUNTER — OFFICE VISIT (OUTPATIENT)
Dept: DERMATOLOGY | Facility: CLINIC | Age: 68
End: 2018-07-27
Payer: COMMERCIAL

## 2018-07-27 DIAGNOSIS — L30.9 CHRONIC ECZEMA OF HAND: Primary | ICD-10-CM

## 2018-07-27 ASSESSMENT — PAIN SCALES - GENERAL: PAINLEVEL: NO PAIN (0)

## 2018-07-27 NOTE — MR AVS SNAPSHOT
After Visit Summary   7/27/2018    Kasey Ibrahim    MRN: [id number]           Patient Information     Date Of Birth          1950        Visit Information        Provider Department      7/27/2018 2:30 PM Lester Aranda MD [facility] Dermatology        Today's Diagnoses     Chronic eczema of hand    -  1      Care Instructions    RESULTS & EVALUATION of PATCH TESTS      Patch test readings after     [x] 2 days, [] 3 days [x] 4 days, [] 5 days,    [x] Allergic reaction diagnosed against: +-++ reaction to Compositae mix      Interpretation/ remarks:   Basically, all patch tests negatives. Some reaction to the compositae mix, but not sure how re levant this is. Patient cannot make a connection and does not recall that she has regular contact with plant extract.   However, patient has a generalised dry skin and this could indicate for an atopic dermatitis without relevant contact dermatitis. This might be an indication for treatment with Dupilumab.     [x] Patient information given   [x] SmartPractice information    [x] Treatment prescribed: maybe try again treatment with Dupilumab. PUVA treatment or radiotherapy would be a possibility as well. Appeal to insurance for Dupilumab treatment.   For the time being topical treatement: 10% OTC urea cream to palms and soles of feet and Vaseline and uses betamethasone ointment ==> use from Monday to Friday regularly every evening and maybe morning on hands Protopic 0.1% ointment (Tacrolimus)    Diagnosis:  ==> Chronic irritant hand and foot eczema probably based on atopic dermatitis2/2 toxic irritant vs allergic vs atopic vs psoriaform  --> unlikely fungal (no response to Itraconazole) -->  in culture [place] species isolated (contaminant?)   --> unlikely contact dermatitis (patch tests without relevant sensibilisation)  --> psoriasis not excluded, but not likely          Follow-ups after your visit        Your next 10 appointments already scheduled      Aug 27, 2018  8:30 AM CDT   (Arrive by 8:15 AM)   Return Allergy with Lester Aranda MD   [facility] Dermatology ([facility])    [address] [address] [phone]              Who to contact     Please call your clinic at [phone] to:    Ask questions about your health    Make or cancel appointments    Discuss your medicines    Learn about your test results    Speak to your doctor            Additional Information About Your Visit        [employer] Information     [employer] gives you secure access to your electronic health record. If you see a primary care provider, you can also send messages to your care team and make appointments. If you have questions, please call your primary care clinic.  If you do not have a primary care provider, please call [phone] and they will assist you.      [employer] is an electronic gateway that provides easy, online access to your medical records. With [employer], you can request a clinic appointment, read your test results, renew a prescription or communicate with your care team.     To access your existing account, please contact your [facility] or call [phone] for assistance.        Care EveryWhere ID     This is your Care EveryWhere ID. This could be used by other organizations to access your [place] medical records  [id number]         Blood Pressure from Last 3 Encounters:   07/23/18 126/79   07/24/17 114/63   07/03/17 115/59    Weight from Last 3 Encounters:   07/24/17 78.9 kg (174 lb)   07/03/17 77.1 kg (170 lb)   03/20/17 80.6 kg (177 lb 12.8 oz)              Today, you had the following     No orders found for display       Primary Care Provider Office Phone # Fax #    Ankit Martini [phone]       [facility] [address] [facility] [address]        Equal Access to Services     LISA BALLARD : jame Hubbard  louisekelvin shah waxay idiin hayaan adeeg kharash la'aan ah. So [facility] [phone].    ATENCIÓN: Si michelle enriquez, tiene a young disposición servicios gratuitos de asistencia lingüística. Jg al [phone].    We comply with applicable federal civil rights laws and [place] laws. We do not discriminate on the basis of race, color, national origin, age, disability, sex, sexual orientation, or gender identity.            Thank you!     Thank you for choosing [facility] DERMATOLOGY  for your care. Our goal is always to provide you with excellent care. Hearing back from our patients is one way we can continue to improve our services. Please take a few minutes to complete the written survey that you may receive in the mail after your visit with us. Thank you!             Your Updated Medication List - Protect others around you: Learn how to safely use, store and throw away your medicines at www.disposemymeds.org.          This list is accurate as of 7/27/18  4:03 PM.  Always use your most recent med list.                   Brand Name Dispense Instructions for use Diagnosis    augmented betamethasone dipropionate 0.05 % ointment    DIPROLENE-AF    50 g    Apply to affected areas of hand twice daily, everyday. Use Vaniply on top of this medication. Occlude with gloves at night.    Allergic contact dermatitis due to drugs in contact with skin       betamethasone dipropionate 0.05 % ointment    DIPROSONE    50 g    Apply topically 2 times daily    Contact dermatitis due to other agent, unspecified contact dermatitis type       Calcium carb-Vitamin D 500 mg [place]-200 units 500-200 MG-UNIT per tablet    OSCAL with D;Oyster Shell Calcium     Take 1 tablet by mouth daily        clobetasol 0.05 % ointment    TEMOVATE    60 g    Apply thin layer twice daily to hands and feet.  Soak hands before application at bedtime.    Allergic contact dermatitis, unspecified trigger, Chronic dermatitis of hands        doxycycline 100 MG capsule    VIBRAMYCIN    28 capsule    Take 1 capsule (100 mg) by mouth 2 times daily    Staphylococcal infection, Foot dermatitis, Hand dermatitis       Dupilumab 300 MG/2ML syringe    DUPIXENT    2 mL    Inject 2 mLs (300 mg) Subcutaneous every 14 days    Hand dermatitis, Foot dermatitis       FLUOXETINE HCL PO           methocarbamol 500 MG tablet    ROBAXIN     Take 500 mg by mouth        methylPREDNISolone 4 MG tablet    MEDROL DOSEPAK    21 tablet    Follow package instructions    Chronic dermatitis of hands       naproxen 500 MG tablet    NAPROSYN     Take 500 mg by mouth        * predniSONE 10 MG tablet    DELTASONE    80 tablet    Take 40mg [id number] then [id number],, then 20mg qday until seen.    Chronic dermatitis of hands, Allergic contact dermatitis, unspecified trigger       * predniSONE 5 MG tablet    DELTASONE    30 tablet    Take 1 tablet (5 mg) by mouth daily    Allergic contact dermatitis, unspecified trigger       * predniSONE 1 MG tablet    DELTASONE    240 tablet    Take 8 tablets (8 mg) by mouth daily    Allergic contact dermatitis due to drugs in contact with skin       * predniSONE 10 MG tablet    DELTASONE    7 tablet    Take 1 tablet (10 mg) by mouth daily    Allergic contact dermatitis, unspecified trigger       * predniSONE 5 MG tablet    DELTASONE    72 tablet    8,8,7,7,6,6,5,5,4,4,3,3,2,2,1,1,    Allergic contact dermatitis, unspecified trigger       tacrolimus 0.1 % ointment    PROTOPIC    60 g    Apply topically 2 times daily    Contact dermatitis due to other agent, unspecified contact dermatitis type       WELLBUTRIN PO           * Notice:  This list has 5 medication(s) that are the same as other medications prescribed for you. Read the directions carefully, and ask your doctor or other care provider to review them with you.

## 2018-07-27 NOTE — PATIENT INSTRUCTIONS
RESULTS & EVALUATION of PATCH TESTS      Patch test readings after     [x] 2 days, [] 3 days [x] 4 days, [] 5 days,    [x] Allergic reaction diagnosed against: +-++ reaction to Compositae mix      Interpretation/ remarks:   Basically, all patch tests negatives. Some reaction to the compositae mix, but not sure how re levant this is. Patient cannot make a connection and does not recall that she has regular contact with plant extract.   However, patient has a generalised dry skin and this could indicate for an atopic dermatitis without relevant contact dermatitis. This might be an indication for treatment with Dupilumab.     [x] Patient information given   [x] SmartPractice information    [x] Treatment prescribed: maybe try again treatment with Dupilumab. PUVA treatment or radiotherapy would be a possibility as well. Appeal to insurance for Dupilumab treatment.   For the time being topical treatement: 10% OTC urea cream to palms and soles of feet and Vaseline and uses betamethasone ointment ==> use from Monday to Friday regularly every evening and maybe morning on hands Protopic 0.1% ointment (Tacrolimus)    Diagnosis:  ==> Chronic irritant hand and foot eczema probably based on atopic dermatitis2/2 toxic irritant vs allergic vs atopic vs psoriaform  --> unlikely fungal (no response to Itraconazole) -->  in culture Sagrahamala species isolated (contaminant?)   --> unlikely contact dermatitis (patch tests without relevant sensibilisation)  --> psoriasis not excluded, but not likely

## 2018-07-27 NOTE — PROGRESS NOTES
[facility] Dermatology Note      Dermatology Problem List:  1.Chronic hand and foot eczema 2/2 toxic irritant vs allergic vs atopic psoriaform    Encounter Date: Jul 27, 2018    CC:   Chief Complaint   Patient presents with     Derm Problem     patch testing evaluation         History of Present Illness:  Ms. Kasey Ibrahim is a 68 year old female who presents with chronic hand and foot eczema for past 4.5 years.  She has tried and failed: nb uvb, MMF, acitretin, methotrexate (all prior to presenting at the ), minocycline ( 8/2016-3/20/17), prednisone, clobetasol 0.05% ointment, CSA 3mg/kg/day. She just finished a medrol dose pack, which helped.  She had previous allergy patch test in 2013 where she was only found to have +1 to gold and bacitracin, in other words, negative patch test. (See [facility] records from [facility]).  No personal or family hx of psoriasis or hay fever.  She uses T gel for posterior scalp itching, which helps.      Past Medical History:   Patient Active Problem List   Diagnosis     Cholangitis     Past Medical History:   Diagnosis Date     Depression      Hand dermatitis      Past Surgical History:   Procedure Laterality Date     FOOT SURGERY       PHACOEMULSIFICATION WITH STANDARD INTRAOCULAR LENS IMPLANT Left 7/3/2017    Procedure: PHACOEMULSIFICATION WITH STANDARD INTRAOCULAR LENS IMPLANT;  Left cataract removal with implant;  Surgeon: Dominguez Payne MD;  Location: [facility] OR     PHACOEMULSIFICATION WITH STANDARD INTRAOCULAR LENS IMPLANT Right 7/24/2017    Procedure: PHACOEMULSIFICATION WITH STANDARD INTRAOCULAR LENS IMPLANT;  Right cataract removal with implant;  Surgeon: Dominguez Payne MD;  Location: [facility] OR       Social History:  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     Family History:  No family hx of atopic dermatitis, hay fever, psoriasis    Medications:  Current Outpatient Prescriptions   Medication Sig Dispense Refill     augmented  betamethasone dipropionate (DIPROLENE-AF) 0.05 % ointment Apply to affected areas of hand twice daily, everyday. Use Vaniply on top of this medication. Occlude with gloves at night. 50 g 3     betamethasone dipropionate (DIPROSONE) 0.05 % ointment Apply topically 2 times daily 50 g 1     BuPROPion HCl (WELLBUTRIN PO)        Calcium carb-Vitamin D 500 mg [place]-200 units (OSCAL WITH D;OYSTER SHELL CALCIUM) 500-200 MG-UNIT per tablet Take 1 tablet by mouth daily       clobetasol (TEMOVATE) 0.05 % ointment Apply thin layer twice daily to hands and feet.  Soak hands before application at bedtime. 60 g 11     doxycycline (VIBRAMYCIN) 100 MG capsule Take 1 capsule (100 mg) by mouth 2 times daily 28 capsule 0     Dupilumab (DUPIXENT) 300 MG/2ML syringe Inject 2 mLs (300 mg) Subcutaneous every 14 days 2 mL 4     FLUOXETINE HCL PO        methocarbamol (ROBAXIN) 500 MG tablet Take 500 mg by mouth       methylPREDNISolone (MEDROL DOSEPAK) 4 MG tablet Follow package instructions 21 tablet 0     naproxen (NAPROSYN) 500 MG tablet Take 500 mg by mouth       predniSONE (DELTASONE) 1 MG tablet Take 8 tablets (8 mg) by mouth daily 240 tablet 3     predniSONE (DELTASONE) 10 MG tablet Take 1 tablet (10 mg) by mouth daily 7 tablet 0     predniSONE (DELTASONE) 10 MG tablet Take 40mg [id number] then [id number],, then 20mg qday until seen. 80 tablet 0     predniSONE (DELTASONE) 5 MG tablet 8,8,7,7,6,6,5,5,4,4,3,3,2,2,1,1, 72 tablet 0     predniSONE (DELTASONE) 5 MG tablet Take 1 tablet (5 mg) by mouth daily 30 tablet 3     tacrolimus (PROTOPIC) 0.1 % ointment Apply topically 2 times daily 60 g 0        Allergies   Allergen Reactions     Sulfadiazine      Other reaction(s): Hives/ Urticaria - Allergy     Tramadol      Other reaction(s): Shortness Of Breath/Wheezing/Chest Tightness - Allergy     Atorvastatin      Other reaction(s): Rash - Allergy     Bronopol Other (See Comments)     Previous Positive (+) skin patch test     Bacitracin  Rash     Patch testing     Glutaraldehyde Rash     2+ patch testing     Gold Rash     Mild patch test reaction     Insulin Detemir      Other reaction(s): Headaches/ Migraines - Intolerance     Lisinopril      Other reaction(s): Headaches/ Migraines - Intolerance     Penicillins Rash     Sulfa Drugs Rash         Review of Systems:  -As per HPI  -Constitutional: The patient denies fatigue, fevers, chills, unintended weight loss, and night sweats.  -HEENT: Patient denies nonhealing oral sores.  -Skin: As above in HPI. No additional skin concerns.    Physical exam:  Vitals: There were no vitals taken for this visit.  GEN: This is a well developed, well-nourished female in no acute distress, in a pleasant mood.    SKIN: Focused examination of the palms and soles was performed.  -Hyperkeratosis of palms and soles of feet, some small fissuring, minimal erythema, tender to palpation near fissures, as expected.  ==> hand and foot dermatosis worse than some weeks ago.         Order for PATCH TESTS      [] Inpatient / [place]....   Bed ....  [x] Outpatient    Skin Atopy           :       [x] Yes   [] No  Rhinitis/Sinusitis :       [x] Yes   [] No  Allergic Asthma   :       [] Yes   [] No  Leg ulcers            :       [] Yes   [x] No  Hand eczema       :       [x] Yes   [] No    Leading hand :  [] R [] L               [] Ambidextrous                      Reason for tests (Suspected allergy): contact allergy on hands/feet or Psoriasis  Known previous allergies: ...    [] Standardized panels  [x] Standard panel (40 tests)  [x] Preservatives & Antimicrobials (31 tests)  [x] Emulsifiers & Additives (25 tests)   [] Perfumes/Flavours & Plants (25 tests)  [] Hairdresser panel (12 tests)  [] Rubber Chemicals (22 tests)  [] Plastics (26 tests)  [x] Colorants/Dyes/Food additives (20 tests)  [] Metals (implants/dental) (23 tests)  [] Local anaesthetics/NSAIDs (12 tests)  [] Antibiotics & Antimycotics (14 tests)   [x] Corticosteroids (15  tests)   [] Photopatch test (32 tests)   [] others: ...    [] Patient's own products: ...    DO NOT test if chemical or biological identity is unknown!     always ask from patient the product information and safety sheets (MSDS)     [] Patient needs consultation with Allergy team  [x] Tests discussed with Allergy team (can have direct appointment for patch tests)    ==> put on back the patch tests on 07/23/2018 (added Colorants and dyes series --> patient thinks that dyes in money might play a role)  --> 1st readings on 07/25/2018: all tests were well sticking to the end and no skin reaction in any of the test fields. However, some itching over the corticosteroid series  --> 2nd readings on 07/27/2018: compositae +-++    RESULTS & EVALUATION of PATCH TESTS      Patch test readings after     [x] 2 days, [] 3 days [x] 4 days, [] 5 days,    [x] Allergic reaction diagnosed against: +-++ reaction to Compositae mix      Interpretation/ remarks:   Basically, all patch tests negatives. Some reaction to the compositae mix, but not sure how re levant this is. Patient cannot make a connection and does not recall that she has regular contact with plant extract.   However, patient has a generalised dry skin and this could indicate for an atopic dermatitis without relevant contact dermatitis. This might be an indication for treatment with Dupilumab.     [x] Patient information given   [x] SmartPractice information    [x] Treatment prescribed: maybe try again treatment with Dupilumab. PUVA treatment or radiotherapy would be a possibility as well. Appeal to insurance for Dupilumab treatment.   For the time being topical treatement: 10% OTC urea cream to palms and soles of feet and Vaseline and uses betamethasone ointment ==> use from Monday to Friday regularly every evening and maybe morning on hands Protopic 0.1% ointment (Tacrolimus)    Diagnosis:  ==> Chronic irritant hand and foot eczema probably based on atopic dermatitis2/2  toxic irritant vs allergic vs atopic vs psoriaform  --> unlikely fungal (no response to Itraconazole) -->  in culture Sagrahamala species isolated (contaminant?)   --> unlikely contact dermatitis (patch tests without relevant sensibilisation)  --> psoriasis not excluded, but not likely    Discussed with patient results form more than 20min

## 2018-07-27 NOTE — LETTER
7/27/2018       RE: Kasey Ibrahim  [address] [facility], [place], operated by [facility] [id number]     Dear Colleague,    Thank you for referring your patient, Kasey Ibrahim, to the [facility] DERMATOLOGY at [facility]. Please see a copy of my visit note below.    [facility] Dermatology Note      Dermatology Problem List:  1.Chronic hand and foot eczema 2/2 toxic irritant vs allergic vs atopic psoriaform    Encounter Date: Jul 27, 2018    CC:   Chief Complaint   Patient presents with     Derm Problem     patch testing evaluation         History of Present Illness:  Ms. Kasey Ibrahim is a 68 year old female who presents with chronic hand and foot eczema for past 4.5 years.  She has tried and failed: nb uvb, MMF, acitretin, methotrexate (all prior to presenting at the ), minocycline ( 8/2016-3/20/17), prednisone, clobetasol 0.05% ointment, CSA 3mg/kg/day. She just finished a medrol dose pack, which helped.  She had previous allergy patch test in 2013 where she was only found to have +1 to gold and bacitracin, in other words, negative patch test. (See [facility] records [facility]  No personal or family hx of psoriasis or hay fever.  She uses T gel for posterior scalp itching, which helps.      Past Medical History:   Patient Active Problem List   Diagnosis     Cholangitis     Past Medical History:   Diagnosis Date     Depression      Hand dermatitis      Past Surgical History:   Procedure Laterality Date     FOOT SURGERY       PHACOEMULSIFICATION WITH STANDARD INTRAOCULAR LENS IMPLANT Left 7/3/2017    Procedure: PHACOEMULSIFICATION WITH STANDARD INTRAOCULAR LENS IMPLANT;  Left cataract removal with implant;  Surgeon: Dominguez Payne MD;  Location: [facility] OR     PHACOEMULSIFICATION WITH STANDARD INTRAOCULAR LENS IMPLANT Right 7/24/2017    Procedure: PHACOEMULSIFICATION WITH STANDARD INTRAOCULAR LENS IMPLANT;  Right cataract removal with implant;  Surgeon: Elmer  Dominguez Smith MD;  Location: [facility] OR       Social History:  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     Family History:  No family hx of atopic dermatitis, hay fever, psoriasis    Medications:  Current Outpatient Prescriptions   Medication Sig Dispense Refill     augmented betamethasone dipropionate (DIPROLENE-AF) 0.05 % ointment Apply to affected areas of hand twice daily, everyday. Use Vaniply on top of this medication. Occlude with gloves at night. 50 g 3     betamethasone dipropionate (DIPROSONE) 0.05 % ointment Apply topically 2 times daily 50 g 1     BuPROPion HCl (WELLBUTRIN PO)        Calcium carb-Vitamin D 500 mg [place]-200 units (OSCAL WITH D;OYSTER SHELL CALCIUM) 500-200 MG-UNIT per tablet Take 1 tablet by mouth daily       clobetasol (TEMOVATE) 0.05 % ointment Apply thin layer twice daily to hands and feet.  Soak hands before application at bedtime. 60 g 11     doxycycline (VIBRAMYCIN) 100 MG capsule Take 1 capsule (100 mg) by mouth 2 times daily 28 capsule 0     Dupilumab (DUPIXENT) 300 MG/2ML syringe Inject 2 mLs (300 mg) Subcutaneous every 14 days 2 mL 4     FLUOXETINE HCL PO        methocarbamol (ROBAXIN) 500 MG tablet Take 500 mg by mouth       methylPREDNISolone (MEDROL DOSEPAK) 4 MG tablet Follow package instructions 21 tablet 0     naproxen (NAPROSYN) 500 MG tablet Take 500 mg by mouth       predniSONE (DELTASONE) 1 MG tablet Take 8 tablets (8 mg) by mouth daily 240 tablet 3     predniSONE (DELTASONE) 10 MG tablet Take 1 tablet (10 mg) by mouth daily 7 tablet 0     predniSONE (DELTASONE) 10 MG tablet Take 40mg [id number] then [id number],, then 20mg qday until seen. 80 tablet 0     predniSONE (DELTASONE) 5 MG tablet 8,8,7,7,6,6,5,5,4,4,3,3,2,2,1,1, 72 tablet 0     predniSONE (DELTASONE) 5 MG tablet Take 1 tablet (5 mg) by mouth daily 30 tablet 3     tacrolimus (PROTOPIC) 0.1 % ointment Apply topically 2 times daily 60 g 0        Allergies   Allergen Reactions     Sulfadiazine      Other  reaction(s): Hives/ Urticaria - Allergy     Tramadol      Other reaction(s): Shortness Of Breath/Wheezing/Chest Tightness - Allergy     Atorvastatin      Other reaction(s): Rash - Allergy     Bronopol Other (See Comments)     Previous Positive (+) skin patch test     Bacitracin Rash     Patch testing     Glutaraldehyde Rash     2+ patch testing     Gold Rash     Mild patch test reaction     Insulin Detemir      Other reaction(s): Headaches/ Migraines - Intolerance     Lisinopril      Other reaction(s): Headaches/ Migraines - Intolerance     Penicillins Rash     Sulfa Drugs Rash         Review of Systems:  -As per HPI  -Constitutional: The patient denies fatigue, fevers, chills, unintended weight loss, and night sweats.  -HEENT: Patient denies nonhealing oral sores.  -Skin: As above in HPI. No additional skin concerns.    Physical exam:  Vitals: There were no vitals taken for this visit.  GEN: This is a well developed, well-nourished female in no acute distress, in a pleasant mood.    SKIN: Focused examination of the palms and soles was performed.  -Hyperkeratosis of palms and soles of feet, some small fissuring, minimal erythema, tender to palpation near fissures, as expected.  ==> hand and foot dermatosis worse than some weeks ago.         Order for PATCH TESTS      [] Inpatient / [place]....   Bed ....  [x] Outpatient    Skin Atopy           :       [x] Yes   [] No  Rhinitis/Sinusitis :       [x] Yes   [] No  Allergic Asthma   :       [] Yes   [] No  Leg ulcers            :       [] Yes   [x] No  Hand eczema       :       [x] Yes   [] No    Leading hand :  [] R [] L               [] Ambidextrous                      Reason for tests (Suspected allergy): contact allergy on hands/feet or Psoriasis  Known previous allergies: ...    [] Standardized panels  [x] Standard panel (40 tests)  [x] Preservatives & Antimicrobials (31 tests)  [x] Emulsifiers & Additives (25 tests)   [] Perfumes/Flavours & Plants (25 tests)  []  Hairdresser panel (12 tests)  [] Rubber Chemicals (22 tests)  [] Plastics (26 tests)  [x] Colorants/Dyes/Food additives (20 tests)  [] Metals (implants/dental) (23 tests)  [] Local anaesthetics/NSAIDs (12 tests)  [] Antibiotics & Antimycotics (14 tests)   [x] Corticosteroids (15 tests)   [] Photopatch test (32 tests)   [] others: ...    [] Patient's own products: ...    DO NOT test if chemical or biological identity is unknown!     always ask from patient the product information and safety sheets (MSDS)     [] Patient needs consultation with Allergy team  [x] Tests discussed with Allergy team (can have direct appointment for patch tests)    ==> put on back the patch tests on 07/23/2018 (added Colorants and dyes series --> patient thinks that dyes in money might play a role)  --> 1st readings on 07/25/2018: all tests were well sticking to the end and no skin reaction in any of the test fields. However, some itching over the corticosteroid series  --> 2nd readings on 07/27/2018: compositae +-++    RESULTS & EVALUATION of PATCH TESTS      Patch test readings after     [x] 2 days, [] 3 days [x] 4 days, [] 5 days,    [x] Allergic reaction diagnosed against: +-++ reaction to Compositae mix      Interpretation/ remarks:   Basically, all patch tests negatives. Some reaction to the compositae mix, but not sure how re levant this is. Patient cannot make a connection and does not recall that she has regular contact with plant extract.   However, patient has a generalised dry skin and this could indicate for an atopic dermatitis without relevant contact dermatitis. This might be an indication for treatment with Dupilumab.     [x] Patient information given   [x] SmartPractice information    [x] Treatment prescribed: maybe try again treatment with Dupilumab. PUVA treatment or radiotherapy would be a possibility as well. Appeal to insurance for Dupilumab treatment.   For the time being topical treatement: 10% OTC urea cream to  palms and soles of feet and Vaseline and uses betamethasone ointment ==> use from Monday to Friday regularly every evening and maybe morning on hands Protopic 0.1% ointment (Tacrolimus)    Diagnosis:  ==> Chronic irritant hand and foot eczema probably based on atopic dermatitis2/2 toxic irritant vs allergic vs atopic vs psoriaform  --> unlikely fungal (no response to Itraconazole) -->  in culture Sagrahamala species isolated (contaminant?)   --> unlikely contact dermatitis (patch tests without relevant sensibilisation)  --> psoriasis not excluded, but not likely    Discussed with patient results form more than 20min    Again, thank you for allowing me to participate in the care of your patient.      Sincerely,    Lester Aranda MD

## 2018-08-17 ENCOUNTER — TELEPHONE (OUTPATIENT)
Dept: DERMATOLOGY | Facility: CLINIC | Age: 68
End: 2018-08-17

## 2018-08-27 ENCOUNTER — OFFICE VISIT (OUTPATIENT)
Dept: DERMATOLOGY | Facility: CLINIC | Age: 68
End: 2018-08-27
Payer: COMMERCIAL

## 2018-08-27 DIAGNOSIS — L20.89 OTHER ATOPIC DERMATITIS: Primary | ICD-10-CM

## 2018-08-27 DIAGNOSIS — L25.8 CONTACT DERMATITIS DUE TO OTHER AGENT, UNSPECIFIED CONTACT DERMATITIS TYPE: ICD-10-CM

## 2018-08-27 RX ORDER — TACROLIMUS 1 MG/G
OINTMENT TOPICAL 2 TIMES DAILY
Qty: 60 G | Refills: 3 | Status: SHIPPED | OUTPATIENT
Start: 2018-08-27

## 2018-08-27 RX ORDER — BETAMETHASONE DIPROPIONATE 0.05 %
OINTMENT (GRAM) TOPICAL 2 TIMES DAILY
Qty: 50 G | Refills: 3 | Status: SHIPPED | OUTPATIENT
Start: 2018-08-27 | End: 2018-12-10

## 2018-08-27 ASSESSMENT — PAIN SCALES - GENERAL: PAINLEVEL: EXTREME PAIN (9)

## 2018-08-27 NOTE — PATIENT INSTRUCTIONS
Diagnosis:  ==> Chronic irritant hand and foot eczema probably based on atopic dermatitis2/2 toxic irritant vs allergic vs atopic vs psoriaform  --> unlikely fungal (no response to Itraconazole) -->  in culture Sagrahamala species isolated (contaminant?)   --> unlikely contact dermatitis (patch tests without relevant sensibilisation)  --> psoriasis not excluded, but not likely     ==> reordered Dupilumab. Patient had previously co-payment with Dupixent of 500$ per dose and this is not possible for patient. Re-ordered Dupixent in the hope that patient has no co-payment because we have now with the negative patch tests more arguments for an atopic dermatitis of hands and feet without relevant contact allergy. Therefore, trial with Dupixent even more important.  --> until then continue treatment with Protopic 0.1% every evening (and morning) from Monday to Friday and Betamethasone ointment Saturday and Sunday.   --> during the day Vaseline and maybe 10 Urea cream  --> in [place] Alitretinoin Gel available, try that first instead of Protopic (2 times daily)     Follow up in 6 weeks to discuss.

## 2018-08-27 NOTE — NURSING NOTE
Chief Complaint   Patient presents with     Derm Problem     4 week patch testing follow up.      Yessi Bustos CMA

## 2018-08-27 NOTE — LETTER
8/27/2018       RE: Kasey Ibrahim  [id number] [facility] [id number]     Dear Colleague,    Thank you for referring your patient, Kasey Ibrhaim, to the [facility] DERMATOLOGY at [facility]. Please see a copy of my visit note below.    [facility] Dermatology Note      Dermatology Problem List:  1.Chronic hand and foot eczema 2/2 toxic irritant vs allergic vs atopic psoriaform    Encounter Date: Aug 27, 2018    CC:   Chief Complaint   Patient presents with     Derm Problem     4 week patch testing follow up.          History of Present Illness:  Ms. Kasey Ibrahim is a 68 year old female who presents with chronic hand and foot eczema for past 4.5 years.  She has tried and failed: nb uvb, MMF, acitretin, methotrexate (all prior to presenting at the ), minocycline ( 8/2016-3/20/17), prednisone, clobetasol 0.05% ointment, CSA 3mg/kg/day. She just finished a medrol dose pack, which helped.  She had previous allergy patch test in 2013 where she was only found to have +1 to gold and bacitracin, in other words, negative patch test. (See [facility] records from [facility]). She did have one single dose of 600 mg dupilumab earlier in 2018 without significant improvement. She was unable to continue the dupilumab tx due to the high co-payment. No personal or family hx of psoriasis or hay fever.  She uses T gel for posterior scalp itching, which helps.     Past Medical History:   Patient Active Problem List   Diagnosis     Cholangitis     Past Medical History:   Diagnosis Date     Depression      Hand dermatitis      Past Surgical History:   Procedure Laterality Date     FOOT SURGERY       PHACOEMULSIFICATION WITH STANDARD INTRAOCULAR LENS IMPLANT Left 7/3/2017    Procedure: PHACOEMULSIFICATION WITH STANDARD INTRAOCULAR LENS IMPLANT;  Left cataract removal with implant;  Surgeon: Dominguez Payne MD;  Location: [facility] OR     PHACOEMULSIFICATION WITH STANDARD  INTRAOCULAR LENS IMPLANT Right 7/24/2017    Procedure: PHACOEMULSIFICATION WITH STANDARD INTRAOCULAR LENS IMPLANT;  Right cataract removal with implant;  Surgeon: Dominguez Payne MD;  Location: [place] OR       Social History:  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     Family History:  No family hx of atopic dermatitis, hay fever, psoriasis    Medications:  Current Outpatient Prescriptions   Medication Sig Dispense Refill     betamethasone dipropionate (DIPROSONE) 0.05 % ointment Apply topically 2 times daily 50 g 1     BuPROPion HCl (WELLBUTRIN PO)        FLUOXETINE HCL PO        tacrolimus (PROTOPIC) 0.1 % ointment Apply topically 2 times daily 60 g 0     augmented betamethasone dipropionate (DIPROLENE-AF) 0.05 % ointment Apply to affected areas of hand twice daily, everyday. Use Vaniply on top of this medication. Occlude with gloves at night. (Patient not taking: Reported on 8/27/2018) 50 g 3     Calcium carb-Vitamin D 500 mg [place]-200 units (OSCAL WITH D;OYSTER SHELL CALCIUM) 500-200 MG-UNIT per tablet Take 1 tablet by mouth daily       clobetasol (TEMOVATE) 0.05 % ointment Apply thin layer twice daily to hands and feet.  Soak hands before application at bedtime. (Patient not taking: Reported on 8/27/2018) 60 g 11     doxycycline (VIBRAMYCIN) 100 MG capsule Take 1 capsule (100 mg) by mouth 2 times daily (Patient not taking: Reported on 8/27/2018) 28 capsule 0     Dupilumab (DUPIXENT) 300 MG/2ML syringe Inject 2 mLs (300 mg) Subcutaneous every 14 days (Patient not taking: Reported on 8/27/2018) 2 mL 4     methocarbamol (ROBAXIN) 500 MG tablet Take 500 mg by mouth       methylPREDNISolone (MEDROL DOSEPAK) 4 MG tablet Follow package instructions (Patient not taking: Reported on 8/27/2018) 21 tablet 0     naproxen (NAPROSYN) 500 MG tablet Take 500 mg by mouth       predniSONE (DELTASONE) 1 MG tablet Take 8 tablets (8 mg) by mouth daily (Patient not taking: Reported on 8/27/2018) 240 tablet 3     predniSONE  (DELTASONE) 10 MG tablet Take 1 tablet (10 mg) by mouth daily (Patient not taking: Reported on 8/27/2018) 7 tablet 0     predniSONE (DELTASONE) 10 MG tablet Take 40mg [id number] then [id number],, then 20mg qday until seen. (Patient not taking: Reported on 8/27/2018) 80 tablet 0     predniSONE (DELTASONE) 5 MG tablet 8,8,7,7,6,6,5,5,4,4,3,3,2,2,1,1, (Patient not taking: Reported on 8/27/2018) 72 tablet 0     predniSONE (DELTASONE) 5 MG tablet Take 1 tablet (5 mg) by mouth daily (Patient not taking: Reported on 8/27/2018) 30 tablet 3        Allergies   Allergen Reactions     Sulfadiazine      Patient declines this allergy. Other reaction(s): Hives/ Urticaria - Allergy     Tramadol      Patient declines this allergy. Other reaction(s): Shortness Of Breath/Wheezing/Chest Tightness - Allergy     Atorvastatin      Patient declines this allergy. Other reaction(s): Rash - Allergy     Bronopol Other (See Comments)     Previous Positive (+) skin patch test     Bacitracin Rash     Patch testing     Glutaraldehyde Rash     2+ patch testing     Gold Rash     Mild patch test reaction     Insulin Detemir      Patient declined this allergy. Other reaction(s): Headaches/ Migraines - Intolerance     Lisinopril      Patient declined this allergy. Other reaction(s): Headaches/ Migraines - Intolerance     Penicillins Rash     Sulfa Drugs Rash         Review of Systems:  -As per HPI  -Constitutional: The patient denies fatigue, fevers, chills, unintended weight loss, and night sweats.  -HEENT: Patient denies nonhealing oral sores.  -Skin: As above in HPI. No additional skin concerns.    Physical exam:  Vitals: There were no vitals taken for this visit.  GEN: This is a well developed, well-nourished female in no acute distress, in a pleasant mood.    SKIN: Focused examination of the palms and soles was performed.  -Hyperkeratosis of palms and soles of feet, some small fissuring, minimal erythema, tender to palpation near fissures, as  expected.  --> overall minimally improved from last month.        Order for PATCH TESTS      [] Inpatient / [place]....   Bed ....  [x] Outpatient    Skin Atopy           :       [x] Yes   [] No  Rhinitis/Sinusitis :       [x] Yes   [] No  Allergic Asthma   :       [] Yes   [] No  Leg ulcers            :       [] Yes   [x] No  Hand eczema       :       [x] Yes   [] No    Leading hand :  [] R [] L               [] Ambidextrous                      Reason for tests (Suspected allergy): contact allergy on hands/feet or Psoriasis  Known previous allergies: ...    [] Standardized panels  [x] Standard panel (40 tests)  [x] Preservatives & Antimicrobials (31 tests)  [x] Emulsifiers & Additives (25 tests)   [] Perfumes/Flavours & Plants (25 tests)  [] Hairdresser panel (12 tests)  [] Rubber Chemicals (22 tests)  [] Plastics (26 tests)  [x] Colorants/Dyes/Food additives (20 tests)  [] Metals (implants/dental) (23 tests)  [] Local anaesthetics/NSAIDs (12 tests)  [] Antibiotics & Antimycotics (14 tests)   [x] Corticosteroids (15 tests)   [] Photopatch test (32 tests)   [] others: ...    [] Patient's own products: ...    DO NOT test if chemical or biological identity is unknown!     always ask from patient the product information and safety sheets (MSDS)     [] Patient needs consultation with Allergy team  [x] Tests discussed with Allergy team (can have direct appointment for patch tests)    ==> put on back the patch tests on 07/23/2018 (added Colorants and dyes series --> patient thinks that dyes in money might play a role)  --> 1st readings on 07/25/2018: all tests were well sticking to the end and no skin reaction in any of the test fields. However, some itching over the corticosteroid series  --> 2nd readings on 07/27/2018: compositae +-++    RESULTS & EVALUATION of PATCH TESTS      Patch test readings after     [x] 2 days, [] 3 days [x] 4 days, [] 5 days,    [x] Allergic reaction diagnosed against: +-++ reaction to Compositae  mix      Interpretation/ remarks:   Basically, all patch tests negatives. Some reaction to the compositae mix, but not sure how re levant this is. Patient cannot make a connection and does not recall that she has regular contact with plant extract.   However, patient has a generalised dry skin and this could indicate for an atopic dermatitis without relevant contact dermatitis. This might be an indication for treatment with Dupilumab.     [x] Patient information given   [x] SmartPractice information    [x] Treatment prescribed: maybe try again treatment with Dupilumab. PUVA treatment or radiotherapy would be a possibility as well. Appeal to insurance for Dupilumab treatment.   For the time being topical treatement: 10% OTC urea cream to palms and soles of feet and Vaseline and uses betamethasone ointment ==> use from Monday to Friday regularly every evening and maybe morning on hands Protopic 0.1% ointment (Tacrolimus)    Diagnosis:  ==> Chronic irritant hand and foot eczema probably based on atopic dermatitis2/2 toxic irritant vs allergic vs atopic vs psoriaform  --> unlikely fungal (no response to Itraconazole) -->  in culture Sagrahamala species isolated (contaminant?)   --> unlikely contact dermatitis (patch tests without relevant sensibilisation)  --> psoriasis not excluded, but not likely    ==> reordered Dupilumab. Patient had previously co-payment with Dupixent of 500$ per dose and this is not possible for patient. Re-ordered Dupixent in the hope that patient has no co-payment because we have now with the negative patch tests more arguments for an atopic dermatitis of hands and feet without relevant contact allergy. Therefore, trial with Dupixent even more important.  --> until then continue treatment with Protopic 0.1% every evening (and morning) from Monday to Friday and Betamethasone ointment Saturday and Sunday.   --> during the day Vaseline and maybe 10 Urea cream  --> in [place] Alitretinoin Gel available,  try that first instead of Protopic (2 times daily)    Follow up in 6 weeks to discuss.    Discussed with patient again the options (20 min) and found that Alitretinoin is available in [place] as topical formulation. Will try this as well. Involved [facility] to discuss the costs of Dupixent    Again, thank you for allowing me to participate in the care of your patient.      Sincerely,    Lester Aranda MD

## 2018-08-27 NOTE — PROGRESS NOTES
[facility] Dermatology Note      Dermatology Problem List:  1.Chronic hand and foot eczema 2/2 toxic irritant vs allergic vs atopic psoriaform    Encounter Date: Aug 27, 2018    CC:   Chief Complaint   Patient presents with     Derm Problem     4 week patch testing follow up.          History of Present Illness:  Ms. Kasey Ibrahim is a 68 year old female who presents with chronic hand and foot eczema for past 4.5 years.  She has tried and failed: nb uvb, MMF, acitretin, methotrexate (all prior to presenting at the ), minocycline ( 8/2016-3/20/17), prednisone, clobetasol 0.05% ointment, CSA 3mg/kg/day. She just finished a medrol dose pack, which helped.  She had previous allergy patch test in 2013 where she was only found to have +1 to gold and bacitracin, in other words, negative patch test. (See [facility] records from [facility]). She did have one single dose of 600 mg dupilumab earlier in 2018 without significant improvement. She was unable to continue the dupilumab tx due to the high co-payment. No personal or family hx of psoriasis or hay fever.  She uses T gel for posterior scalp itching, which helps.     Past Medical History:   Patient Active Problem List   Diagnosis     Cholangitis     Past Medical History:   Diagnosis Date     Depression      Hand dermatitis      Past Surgical History:   Procedure Laterality Date     FOOT SURGERY       PHACOEMULSIFICATION WITH STANDARD INTRAOCULAR LENS IMPLANT Left 7/3/2017    Procedure: PHACOEMULSIFICATION WITH STANDARD INTRAOCULAR LENS IMPLANT;  Left cataract removal with implant;  Surgeon: Dominguez Payne MD;  Location: [facility] OR     PHACOEMULSIFICATION WITH STANDARD INTRAOCULAR LENS IMPLANT Right 7/24/2017    Procedure: PHACOEMULSIFICATION WITH STANDARD INTRAOCULAR LENS IMPLANT;  Right cataract removal with implant;  Surgeon: Dominguez Payne MD;  Location: [place] OR       Social History:  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      Family History:  No family hx of atopic dermatitis, hay fever, psoriasis    Medications:  Current Outpatient Prescriptions   Medication Sig Dispense Refill     betamethasone dipropionate (DIPROSONE) 0.05 % ointment Apply topically 2 times daily 50 g 1     BuPROPion HCl (WELLBUTRIN PO)        FLUOXETINE HCL PO        tacrolimus (PROTOPIC) 0.1 % ointment Apply topically 2 times daily 60 g 0     augmented betamethasone dipropionate (DIPROLENE-AF) 0.05 % ointment Apply to affected areas of hand twice daily, everyday. Use Vaniply on top of this medication. Occlude with gloves at night. (Patient not taking: Reported on 8/27/2018) 50 g 3     Calcium carb-Vitamin D 500 mg [place]-200 units (OSCAL WITH D;OYSTER SHELL CALCIUM) 500-200 MG-UNIT per tablet Take 1 tablet by mouth daily       clobetasol (TEMOVATE) 0.05 % ointment Apply thin layer twice daily to hands and feet.  Soak hands before application at bedtime. (Patient not taking: Reported on 8/27/2018) 60 g 11     doxycycline (VIBRAMYCIN) 100 MG capsule Take 1 capsule (100 mg) by mouth 2 times daily (Patient not taking: Reported on 8/27/2018) 28 capsule 0     Dupilumab (DUPIXENT) 300 MG/2ML syringe Inject 2 mLs (300 mg) Subcutaneous every 14 days (Patient not taking: Reported on 8/27/2018) 2 mL 4     methocarbamol (ROBAXIN) 500 MG tablet Take 500 mg by mouth       methylPREDNISolone (MEDROL DOSEPAK) 4 MG tablet Follow package instructions (Patient not taking: Reported on 8/27/2018) 21 tablet 0     naproxen (NAPROSYN) 500 MG tablet Take 500 mg by mouth       predniSONE (DELTASONE) 1 MG tablet Take 8 tablets (8 mg) by mouth daily (Patient not taking: Reported on 8/27/2018) 240 tablet 3     predniSONE (DELTASONE) 10 MG tablet Take 1 tablet (10 mg) by mouth daily (Patient not taking: Reported on 8/27/2018) 7 tablet 0     predniSONE (DELTASONE) 10 MG tablet Take 40mg [id number] then [id number],, then 20mg qday until seen. (Patient not taking: Reported on 8/27/2018) 80  tablet 0     predniSONE (DELTASONE) 5 MG tablet 8,8,7,7,6,6,5,5,4,4,3,3,2,2,1,1, (Patient not taking: Reported on 8/27/2018) 72 tablet 0     predniSONE (DELTASONE) 5 MG tablet Take 1 tablet (5 mg) by mouth daily (Patient not taking: Reported on 8/27/2018) 30 tablet 3        Allergies   Allergen Reactions     Sulfadiazine      Patient declines this allergy. Other reaction(s): Hives/ Urticaria - Allergy     Tramadol      Patient declines this allergy. Other reaction(s): Shortness Of Breath/Wheezing/Chest Tightness - Allergy     Atorvastatin      Patient declines this allergy. Other reaction(s): Rash - Allergy     Bronopol Other (See Comments)     Previous Positive (+) skin patch test     Bacitracin Rash     Patch testing     Glutaraldehyde Rash     2+ patch testing     Gold Rash     Mild patch test reaction     Insulin Detemir      Patient declined this allergy. Other reaction(s): Headaches/ Migraines - Intolerance     Lisinopril      Patient declined this allergy. Other reaction(s): Headaches/ Migraines - Intolerance     Penicillins Rash     Sulfa Drugs Rash         Review of Systems:  -As per HPI  -Constitutional: The patient denies fatigue, fevers, chills, unintended weight loss, and night sweats.  -HEENT: Patient denies nonhealing oral sores.  -Skin: As above in HPI. No additional skin concerns.    Physical exam:  Vitals: There were no vitals taken for this visit.  GEN: This is a well developed, well-nourished female in no acute distress, in a pleasant mood.    SKIN: Focused examination of the palms and soles was performed.  -Hyperkeratosis of palms and soles of feet, some small fissuring, minimal erythema, tender to palpation near fissures, as expected.  --> overall minimally improved from last month.        Order for PATCH TESTS      [] Inpatient / [place]....   Bed ....  [x] Outpatient    Skin Atopy           :       [x] Yes   [] No  Rhinitis/Sinusitis :       [x] Yes   [] No  Allergic Asthma   :       [] Yes   []  No  Leg ulcers            :       [] Yes   [x] No  Hand eczema       :       [x] Yes   [] No    Leading hand :  [] R [] L               [] Ambidextrous                      Reason for tests (Suspected allergy): contact allergy on hands/feet or Psoriasis  Known previous allergies: ...    [] Standardized panels  [x] Standard panel (40 tests)  [x] Preservatives & Antimicrobials (31 tests)  [x] Emulsifiers & Additives (25 tests)   [] Perfumes/Flavours & Plants (25 tests)  [] Hairdresser panel (12 tests)  [] Rubber Chemicals (22 tests)  [] Plastics (26 tests)  [x] Colorants/Dyes/Food additives (20 tests)  [] Metals (implants/dental) (23 tests)  [] Local anaesthetics/NSAIDs (12 tests)  [] Antibiotics & Antimycotics (14 tests)   [x] Corticosteroids (15 tests)   [] Photopatch test (32 tests)   [] others: ...    [] Patient's own products: ...    DO NOT test if chemical or biological identity is unknown!     always ask from patient the product information and safety sheets (MSDS)     [] Patient needs consultation with Allergy team  [x] Tests discussed with Allergy team (can have direct appointment for patch tests)    ==> put on back the patch tests on 07/23/2018 (added Colorants and dyes series --> patient thinks that dyes in money might play a role)  --> 1st readings on 07/25/2018: all tests were well sticking to the end and no skin reaction in any of the test fields. However, some itching over the corticosteroid series  --> 2nd readings on 07/27/2018: compositae +-++    RESULTS & EVALUATION of PATCH TESTS      Patch test readings after     [x] 2 days, [] 3 days [x] 4 days, [] 5 days,    [x] Allergic reaction diagnosed against: +-++ reaction to Compositae mix      Interpretation/ remarks:   Basically, all patch tests negatives. Some reaction to the compositae mix, but not sure how re levant this is. Patient cannot make a connection and does not recall that she has regular contact with plant extract.   However, patient has a  generalised dry skin and this could indicate for an atopic dermatitis without relevant contact dermatitis. This might be an indication for treatment with Dupilumab.     [x] Patient information given   [x] SmartPractice information    [x] Treatment prescribed: maybe try again treatment with Dupilumab. PUVA treatment or radiotherapy would be a possibility as well. Appeal to insurance for Dupilumab treatment.   For the time being topical treatement: 10% OTC urea cream to palms and soles of feet and Vaseline and uses betamethasone ointment ==> use from Monday to Friday regularly every evening and maybe morning on hands Protopic 0.1% ointment (Tacrolimus)    Diagnosis:  ==> Chronic irritant hand and foot eczema probably based on atopic dermatitis2/2 toxic irritant vs allergic vs atopic vs psoriaform  --> unlikely fungal (no response to Itraconazole) -->  in culture Sagrahamala species isolated (contaminant?)   --> unlikely contact dermatitis (patch tests without relevant sensibilisation)  --> psoriasis not excluded, but not likely    ==> reordered Dupilumab. Patient had previously co-payment with Dupixent of 500$ per dose and this is not possible for patient. Re-ordered Dupixent in the hope that patient has no co-payment because we have now with the negative patch tests more arguments for an atopic dermatitis of hands and feet without relevant contact allergy. Therefore, trial with Dupixent even more important.  --> until then continue treatment with Protopic 0.1% every evening (and morning) from Monday to Friday and Betamethasone ointment Saturday and Sunday.   --> during the day Vaseline and maybe 10 Urea cream  --> in [place] Alitretinoin Gel available, try that first instead of Protopic (2 times daily)    Follow up in 6 weeks to discuss.    Discussed with patient again the options (20 min) and found that Alitretinoin is available in [place] as topical formulation. Will try this as well. Involved [facility] to discuss the costs of  Dupixent

## 2018-08-27 NOTE — MR AVS SNAPSHOT
After Visit Summary   8/27/2018    Kasey Ibrahim    MRN: [id number]           Patient Information     Date Of Birth          1950        Visit Information        Provider Department      8/27/2018 8:30 AM Lester Aranda MD [facility]        Today's Diagnoses     Other atopic dermatitis    -  1    Contact dermatitis due to other agent, unspecified contact dermatitis type          Care Instructions    Diagnosis:  ==> Chronic irritant hand and foot eczema probably based on atopic dermatitis2/2 toxic irritant vs allergic vs atopic vs psoriaform  --> unlikely fungal (no response to Itraconazole) -->  in culture Sagrahamala species isolated (contaminant?)   --> unlikely contact dermatitis (patch tests without relevant sensibilisation)  --> psoriasis not excluded, but not likely     ==> reordered Dupilumab. Patient had previously co-payment with Dupixent of 500$ per dose and this is not possible for patient. Re-ordered Dupixent in the hope that patient has no co-payment because we have now with the negative patch tests more arguments for an atopic dermatitis of hands and feet without relevant contact allergy. Therefore, trial with Dupixent even more important.  --> until then continue treatment with Protopic 0.1% every evening (and morning) from Monday to Friday and Betamethasone ointment Saturday and Sunday.   --> during the day Vaseline and maybe 10 Urea cream  --> in [place] Alitretinoin Gel available, try that first instead of Protopic (2 times daily)     Follow up in 6 weeks to discuss.          Follow-ups after your visit        Your next 10 appointments already scheduled     Oct 08, 2018  8:30 AM CDT   (Arrive by 8:15 AM)   Return Allergy with Lester Aranda MD   [facility] ([facility])    [address] [place] [id number] [phone]              Who to contact     Please call your clinic at [phone] to:    Ask  questions about your health    Make or cancel appointments    Discuss your medicines    Learn about your test results    Speak to your doctor            Additional Information About Your Visit        [employer] software Information     [employer] gives you secure access to your electronic health record. If you see a primary care provider, you can also send messages to your care team and make appointments. If you have questions, please call your primary care clinic.  If you do not have a primary care provider, please call [phone] and they will assist you.      [employer] is an electronic gateway that provides easy, online access to your medical records. With [employer], you can request a clinic appointment, read your test results, renew a prescription or communicate with your care team.     To access your existing account, please contact your [facility] or call [phone] for assistance.        Care EveryWhere ID     This is your Care EveryWhere ID. This could be used by other organizations to access your [facility] medical records  [id number]         Blood Pressure from Last 3 Encounters:   07/23/18 126/79   07/24/17 114/63   07/03/17 115/59    Weight from Last 3 Encounters:   07/24/17 78.9 kg (174 lb)   07/03/17 77.1 kg (170 lb)   03/20/17 80.6 kg (177 lb 12.8 oz)              Today, you had the following     No orders found for display         Today's Medication Changes          These changes are accurate as of 8/27/18  9:33 AM.  If you have any questions, ask your nurse or doctor.               Start taking these medicines.        Dose/Directions    Alitretinoin 0.1 % Gel   Used for:  Other atopic dermatitis   Started by:  Lester Aranda MD        Externally apply topically 2 times daily   Quantity:  60 g   Refills:  3         These medicines have changed or have updated prescriptions.        Dose/Directions    * Dupilumab 300 MG/2ML syringe   Commonly known as:  DUPIXENT   This may have  changed:  Another medication with the same name was added. Make sure you understand how and when to take each.   Used for:  Hand dermatitis, Foot dermatitis   Changed by:  Lester Aranda MD        Dose:  300 mg   Inject 2 mLs (300 mg) Subcutaneous every 14 days   Quantity:  2 mL   Refills:  4       * Dupilumab 300 MG/2ML syringe   Commonly known as:  DUPIXENT   This may have changed:  You were already taking a medication with the same name, and this prescription was added. Make sure you understand how and when to take each.   Used for:  Other atopic dermatitis   Changed by:  Lester Aranda MD        Dose:  300 mg   Inject 2 mLs (300 mg) Subcutaneous every 14 days   Quantity:  4 mL   Refills:  5       * Notice:  This list has 2 medication(s) that are the same as other medications prescribed for you. Read the directions carefully, and ask your doctor or other care provider to review them with you.         Where to get your medicines      These medications were sent to [facility] - [place], [place] - [address] [place] [address]  [address] [place] [id number]    Hours:  Mon-Fri 8:30am-5:00pm Toll Free [phone] Phone:  [phone]     Alitretinoin 0.1 % Gel    Dupilumab 300 MG/2ML syringe         These medications were sent to [facility] [place], [place] - [address]   [address] [address]     Phone:  [phone]     betamethasone dipropionate 0.05 % ointment    tacrolimus 0.1 % ointment                Primary Care Provider Office Phone # Fax #    Ankit Martini [phone]       [facility] [address] [facility] [place] [facility] [address]        Equal Access to Services     MARVIN BALLARD AH: Gala Chiu, jame rice, kelvin kaalmada kayy, rut figueroa. So [facility] [phone].    ATENCIÓN: Si habla español, tiene a young disposición servicios gratuitos de asistencia  lingüísticaMeliza Gregorio al [phone].    We comply with applicable federal civil rights laws and [place] laws. We do not discriminate on the basis of race, color, national origin, age, disability, sex, sexual orientation, or gender identity.            Thank you!     Thank you for choosing [facility]  for your care. Our goal is always to provide you with excellent care. Hearing back from our patients is one way we can continue to improve our services. Please take a few minutes to complete the written survey that you may receive in the mail after your visit with us. Thank you!             Your Updated Medication List - Protect others around you: Learn how to safely use, store and throw away your medicines at www.disposemymeds.org.          This list is accurate as of 8/27/18  9:33 AM.  Always use your most recent med list.                   Brand Name Dispense Instructions for use Diagnosis    Alitretinoin 0.1 % Gel     60 g    Externally apply topically 2 times daily    Other atopic dermatitis       augmented betamethasone dipropionate 0.05 % ointment    DIPROLENE-AF    50 g    Apply to affected areas of hand twice daily, everyday. Use Vaniply on top of this medication. Occlude with gloves at night.    Allergic contact dermatitis due to drugs in contact with skin       betamethasone dipropionate 0.05 % ointment    DIPROSONE    50 g    Apply topically 2 times daily    Contact dermatitis due to other agent, unspecified contact dermatitis type       calcium carbonate 500 mg-vitamin D 200 units 500-200 MG-UNIT per tablet    OSCAL with D;OYSTER SHELL CALCIUM     Take 1 tablet by mouth daily        clobetasol 0.05 % ointment    TEMOVATE    60 g    Apply thin layer twice daily to hands and feet.  Soak hands before application at bedtime.    Allergic contact dermatitis, unspecified trigger, Chronic dermatitis of hands       * Dupilumab 300 MG/2ML syringe    DUPIXENT    2 mL    Inject 2 mLs (300 mg) Subcutaneous  every 14 days    Hand dermatitis, Foot dermatitis       * Dupilumab 300 MG/2ML syringe    DUPIXENT    4 mL    Inject 2 mLs (300 mg) Subcutaneous every 14 days    Other atopic dermatitis       FLUOXETINE HCL PO           methocarbamol 500 MG tablet    ROBAXIN     Take 500 mg by mouth        methylPREDNISolone 4 MG tablet    MEDROL DOSEPAK    21 tablet    Follow package instructions    Chronic dermatitis of hands       naproxen 500 MG tablet    NAPROSYN     Take 500 mg by mouth        * predniSONE 10 MG tablet    DELTASONE    80 tablet    Take 40mg [id number] then [id number],, then 20mg qday until seen.    Chronic dermatitis of hands, Allergic contact dermatitis, unspecified trigger       * predniSONE 5 MG tablet    DELTASONE    30 tablet    Take 1 tablet (5 mg) by mouth daily    Allergic contact dermatitis, unspecified trigger       * predniSONE 1 MG tablet    DELTASONE    240 tablet    Take 8 tablets (8 mg) by mouth daily    Allergic contact dermatitis due to drugs in contact with skin       * predniSONE 10 MG tablet    DELTASONE    7 tablet    Take 1 tablet (10 mg) by mouth daily    Allergic contact dermatitis, unspecified trigger       * predniSONE 5 MG tablet    DELTASONE    72 tablet    8,8,7,7,6,6,5,5,4,4,3,3,2,2,1,1,    Allergic contact dermatitis, unspecified trigger       tacrolimus 0.1 % ointment    PROTOPIC    60 g    Apply topically 2 times daily    Contact dermatitis due to other agent, unspecified contact dermatitis type       WELLBUTRIN PO           * Notice:  This list has 7 medication(s) that are the same as other medications prescribed for you. Read the directions carefully, and ask your doctor or other care provider to review them with you.

## 2018-08-30 ENCOUNTER — TELEPHONE (OUTPATIENT)
Dept: DERMATOLOGY | Facility: CLINIC | Age: 68
End: 2018-08-30

## 2018-09-05 NOTE — TELEPHONE ENCOUNTER
Lester Aranda MD   You 5 days ago                   Not surprised!   Corticosteroids are NOT the solution. Patient has used them for years!     Maybe try other pharmacy to find Alitretinoin Gel and I will be happy to re-prescribe     Kind regards     PB (Routing comment)                        You   Lester Aranda MD 5 days ago                   Do you know what she may be talking about?     Thank you,   Regine Yancey LPN (Routing comment)                        Niru Maldonado CMA   You 6 days ago                   Can you discuss with Dr. Aranda? (Routing comment)                 
[facility] Call Center    Phone Message    May a detailed message be left on voicemail: yes    Reason for Call: PT claims the prescription ointment from [place] that was discussed at the appt wasn't sent over to the [facility] pharmacy. PT doesn't know it's name but the only two RX's I see that were sent over to [facility] is betamethasone dipropionate (DIPROSONE) 0.05 % ointment and tacrolimus (PROTOPIC) 0.1 % ointment. PT wouldn't confirm if they got those prescriptions and instead just wanted me to send Dr. Aranda a message mentioning it was the ointment from [place].     Action Taken: Message routed to:  [facility] DERM    
Pharmacy called and questions answered.    Patient called and informed that pharmacy can deliver medication and phone number provided for patient to set up delivery.   
Spoke with patient an informed her that medication was sent to the specialty clinic. She states that she has not been notified that rx is available. Will call pharmacy to verify medication.   
How Severe Is Your Skin Lesion?: moderate
Has Your Skin Lesion Been Treated?: not been treated
Is This A New Presentation, Or A Follow-Up?: Moles

## 2018-10-08 ENCOUNTER — OFFICE VISIT (OUTPATIENT)
Dept: DERMATOLOGY | Facility: CLINIC | Age: 68
End: 2018-10-08
Payer: COMMERCIAL

## 2018-10-08 DIAGNOSIS — L30.9 CHRONIC ECZEMA OF HAND: Primary | ICD-10-CM

## 2018-10-08 RX ORDER — BETAMETHASONE DIPROPIONATE 0.05 %
OINTMENT (GRAM) TOPICAL
Qty: 50 G | Refills: 3 | Status: SHIPPED | OUTPATIENT
Start: 2018-10-08

## 2018-10-08 RX ORDER — TACROLIMUS 1 MG/G
OINTMENT TOPICAL 2 TIMES DAILY
Qty: 60 G | Refills: 3 | Status: SHIPPED | OUTPATIENT
Start: 2018-10-08 | End: 2018-12-10

## 2018-10-08 RX ORDER — ACITRETIN 25 MG/1
CAPSULE ORAL
Qty: 30 CAPSULE | Refills: 1 | Status: SHIPPED | OUTPATIENT
Start: 2018-10-08

## 2018-10-08 ASSESSMENT — PAIN SCALES - GENERAL: PAINLEVEL: NO PAIN (0)

## 2018-10-08 NOTE — PROGRESS NOTES
[facility] Note      Dermatology Problem List:  1.Chronic hand and foot eczema 2/2 toxic irritant vs allergic vs atopic psoriaform    Encounter Date: Oct 8, 2018    CC:   Chief Complaint   Patient presents with     Allergic Reaction     Kasey is here to follow up regarding her palms and sloes of feet. She states that there has been no improvement since she has been able to get medications         History of Present Illness:  Ms. Kasey Ibrahim is a 68 year old female who presents with chronic hand and foot eczema for past 4.5 years.  She has tried and failed: nb uvb, MMF, acitretin, methotrexate (all prior to presenting at the ), minocycline ( 8/2016-3/20/17), prednisone, clobetasol 0.05% ointment, CSA 3mg/kg/day. She just finished a medrol dose pack, which helped.  She had previous allergy patch test in 2013 where she was only found to have +1 to gold and bacitracin, in other words, negative patch test. (See [facility] records from [facility]). She did have one single dose of 600 mg dupilumab earlier in 2018 without significant improvement. She was unable to continue the dupilumab tx due to the high co-payment. No personal or family hx of psoriasis or hay fever.  She uses T gel for posterior scalp itching, which helps.     Past Medical History:   Patient Active Problem List   Diagnosis     Cholangitis     Past Medical History:   Diagnosis Date     Depression      Hand dermatitis      Past Surgical History:   Procedure Laterality Date     FOOT SURGERY       PHACOEMULSIFICATION WITH STANDARD INTRAOCULAR LENS IMPLANT Left 7/3/2017    Procedure: PHACOEMULSIFICATION WITH STANDARD INTRAOCULAR LENS IMPLANT;  Left cataract removal with implant;  Surgeon: Dominguez Payne MD;  Location: [facility] OR     PHACOEMULSIFICATION WITH STANDARD INTRAOCULAR LENS IMPLANT Right 7/24/2017    Procedure: PHACOEMULSIFICATION WITH STANDARD INTRAOCULAR LENS IMPLANT;  Right cataract removal with implant;   Surgeon: Dominguez Payne MD;  Location: [facility] OR       Social History:  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     Family History:  No family hx of atopic dermatitis, hay fever, psoriasis    Medications:  Current Outpatient Prescriptions   Medication Sig Dispense Refill     Alitretinoin 0.1 % GEL Externally apply topically 2 times daily 60 g 3     betamethasone dipropionate (DIPROSONE) 0.05 % ointment Apply topically 2 times daily 50 g 3     BuPROPion HCl (WELLBUTRIN PO)        Calcium carb-Vitamin D 500 mg [place]-200 units (OSCAL WITH D;OYSTER SHELL CALCIUM) 500-200 MG-UNIT per tablet Take 1 tablet by mouth daily       Dupilumab (DUPIXENT) 300 MG/2ML syringe Inject 2 mLs (300 mg) Subcutaneous every 14 days 4 mL 5     FLUOXETINE HCL PO        methocarbamol (ROBAXIN) 500 MG tablet Take 500 mg by mouth       naproxen (NAPROSYN) 500 MG tablet Take 500 mg by mouth       augmented betamethasone dipropionate (DIPROLENE-AF) 0.05 % ointment Apply to affected areas of hand twice daily, everyday. Use Vaniply on top of this medication. Occlude with gloves at night. (Patient not taking: Reported on 8/27/2018) 50 g 3     clobetasol (TEMOVATE) 0.05 % ointment Apply thin layer twice daily to hands and feet.  Soak hands before application at bedtime. (Patient not taking: Reported on 8/27/2018) 60 g 11     Dupilumab (DUPIXENT) 300 MG/2ML syringe Inject 2 mLs (300 mg) Subcutaneous every 14 days (Patient not taking: Reported on 8/27/2018) 2 mL 4     methylPREDNISolone (MEDROL DOSEPAK) 4 MG tablet Follow package instructions (Patient not taking: Reported on 8/27/2018) 21 tablet 0     predniSONE (DELTASONE) 1 MG tablet Take 8 tablets (8 mg) by mouth daily (Patient not taking: Reported on 8/27/2018) 240 tablet 3     predniSONE (DELTASONE) 10 MG tablet Take 1 tablet (10 mg) by mouth daily (Patient not taking: Reported on 8/27/2018) 7 tablet 0     predniSONE (DELTASONE) 10 MG tablet Take 40mg gqrea3rcte then [id number],, then  20mg qday until seen. (Patient not taking: Reported on 8/27/2018) 80 tablet 0     predniSONE (DELTASONE) 5 MG tablet 8,8,7,7,6,6,5,5,4,4,3,3,2,2,1,1, (Patient not taking: Reported on 8/27/2018) 72 tablet 0     predniSONE (DELTASONE) 5 MG tablet Take 1 tablet (5 mg) by mouth daily (Patient not taking: Reported on 8/27/2018) 30 tablet 3     tacrolimus (PROTOPIC) 0.1 % ointment Apply topically 2 times daily (Patient not taking: Reported on 10/8/2018) 60 g 3        Allergies   Allergen Reactions     Sulfadiazine      Patient declines this allergy. Other reaction(s): Hives/ Urticaria - Allergy     Tramadol      Patient declines this allergy. Other reaction(s): Shortness Of Breath/Wheezing/Chest Tightness - Allergy     Atorvastatin      Patient declines this allergy. Other reaction(s): Rash - Allergy     Bronopol Other (See Comments)     Previous Positive (+) skin patch test     Bacitracin Rash     Patch testing     Glutaraldehyde Rash     2+ patch testing     Gold Rash     Mild patch test reaction     Insulin Detemir      Patient declined this allergy. Other reaction(s): Headaches/ Migraines - Intolerance     Lisinopril      Patient declined this allergy. Other reaction(s): Headaches/ Migraines - Intolerance     Penicillins Rash     Sulfa Drugs Rash         Review of Systems:  -As per HPI  -Constitutional: The patient denies fatigue, fevers, chills, unintended weight loss, and night sweats.  -HEENT: Patient denies nonhealing oral sores.  -Skin: As above in HPI. No additional skin concerns.    Physical exam:  Vitals: There were no vitals taken for this visit.  GEN: This is a well developed, well-nourished female in no acute distress, in a pleasant mood.    SKIN: Focused examination of the palms and soles was performed.  -Hyperkeratosis of palms and soles of feet, some small fissuring, minimal erythema, tender to palpation near fissures, as expected. Patient has lots of pain and sometimes blood on fingers and heels.  -->  overall minimally improved from last month.        Order for PATCH TESTS      [] Inpatient / [place]....   Bed ....  [x] Outpatient    Skin Atopy           :       [x] Yes   [] No  Rhinitis/Sinusitis :       [x] Yes   [] No  Allergic Asthma   :       [] Yes   [] No  Leg ulcers            :       [] Yes   [x] No  Hand eczema       :       [x] Yes   [] No    Leading hand :  [] R [] L               [] Ambidextrous                      Reason for tests (Suspected allergy): contact allergy on hands/feet or Psoriasis  Known previous allergies: ...    [] Standardized panels  [x] Standard panel (40 tests)  [x] Preservatives & Antimicrobials (31 tests)  [x] Emulsifiers & Additives (25 tests)   [] Perfumes/Flavours & Plants (25 tests)  [] Hairdresser panel (12 tests)  [] Rubber Chemicals (22 tests)  [] Plastics (26 tests)  [x] Colorants/Dyes/Food additives (20 tests)  [] Metals (implants/dental) (23 tests)  [] Local anaesthetics/NSAIDs (12 tests)  [] Antibiotics & Antimycotics (14 tests)   [x] Corticosteroids (15 tests)   [] Photopatch test (32 tests)   [] others: ...    [] Patient's own products: ...    DO NOT test if chemical or biological identity is unknown!     always ask from patient the product information and safety sheets (MSDS)     [] Patient needs consultation with Allergy team  [x] Tests discussed with Allergy team (can have direct appointment for patch tests)    ==> put on back the patch tests on 07/23/2018 (added Colorants and dyes series --> patient thinks that dyes in money might play a role)  --> 1st readings on 07/25/2018: all tests were well sticking to the end and no skin reaction in any of the test fields. However, some itching over the corticosteroid series  --> 2nd readings on 07/27/2018: compositae +-++    RESULTS & EVALUATION of PATCH TESTS      Patch test readings after     [x] 2 days, [] 3 days [x] 4 days, [] 5 days,    [x] Allergic reaction diagnosed against: +-++ reaction to Compositae  mix      Interpretation/ remarks:   Basically, all patch tests negatives. Some reaction to the compositae mix, but not sure how re levant this is. Patient cannot make a connection and does not recall that she has regular contact with plant extract.   However, patient has a generalised dry skin and this could indicate for an atopic dermatitis without relevant contact dermatitis. This might be an indication for treatment with Dupilumab.     [x] Patient information given   [x] SmartPractice information    [x] Treatment prescribed: maybe try again treatment with Dupilumab. PUVA treatment or radiotherapy would be a possibility as well. Appeal to insurance for Dupilumab treatment.   For the time being topical treatement: 10% OTC urea cream to palms and soles of feet and Vaseline and uses betamethasone ointment ==> use from Monday to Friday regularly every evening and maybe morning on hands Protopic 0.1% ointment (Tacrolimus)    Diagnosis:  ==> Chronic irritant hand and foot eczema probably based on atopic dermatitis2/2 toxic irritant vs allergic vs atopic vs psoriaform  --> unlikely fungal (no response to Itraconazole) -->  in culture Sagrahamala species isolated (contaminant?)   --> unlikely contact dermatitis (patch tests without relevant sensibilisation)  --> psoriasis not excluded, but not likely    ==> reordered Dupilumab. Patient had previously co-payment with Dupixent of 500$ per dose and this is not possible for patient. Re-ordered Dupixent in the hope that patient has no co-payment because we have now with the negative patch tests more arguments for an atopic dermatitis of hands and feet without relevant contact allergy. Therefore, trial with Dupixent even more important.  --> until then continue treatment with Protopic 0.1% every evening (and morning) from Monday to Friday and Betamethasone ointment Saturday and Sunday.   --> during the day Vaseline and maybe 10 Urea cream  --> in [place] Alitretinoin Gel available,  try that first instead of Protopic (2 times daily)    ==> Tazorac (Alitretinoin topically) and Dupilumab would be too expensive for the patient. Insurance doesn't have lots of co-payment. Would be more than $1'600.     Try Acitretine 25mg every day together with Protopic and Betamethasone and Urea cream. If insurance does not pay Acitretine, then maybe try CyA or Methotrexate    Follow up in 3-4 weeks to discuss and evaluate then liver function.

## 2018-10-08 NOTE — LETTER
10/8/2018       RE: Kasey Ibrahim  [phone] [facility] [address] [place] [address]     Dear Colleague,    Thank you for referring your patient, Kasey Ibrahim, to the [facility] DERMATOLOGY at [facility]. Please see a copy of my visit note below.    [facility] Dermatology Note      Dermatology Problem List:  1.Chronic hand and foot eczema 2/2 toxic irritant vs allergic vs atopic psoriaform    Encounter Date: Oct 8, 2018    CC:   Chief Complaint   Patient presents with     Allergic Reaction     Kasey is here to follow up regarding her palms and sloes of feet. She states that there has been no improvement since she has been able to get medications         History of Present Illness:  Ms. Kasey Ibrahim is a 68 year old female who presents with chronic hand and foot eczema for past 4.5 years.  She has tried and failed: nb uvb, MMF, acitretin, methotrexate (all prior to presenting at the ), minocycline ( 8/2016-3/20/17), prednisone, clobetasol 0.05% ointment, CSA 3mg/kg/day. She just finished a medrol dose pack, which helped.  She had previous allergy patch test in 2013 where she was only found to have +1 to gold and bacitracin, in other words, negative patch test. (See [facility] from [facility]). She did have one single dose of 600 mg dupilumab earlier in 2018 without significant improvement. She was unable to continue the dupilumab tx due to the high co-payment. No personal or family hx of psoriasis or hay fever.  She uses T gel for posterior scalp itching, which helps.     Past Medical History:   Patient Active Problem List   Diagnosis     Cholangitis     Past Medical History:   Diagnosis Date     Depression      Hand dermatitis      Past Surgical History:   Procedure Laterality Date     FOOT SURGERY       PHACOEMULSIFICATION WITH STANDARD INTRAOCULAR LENS IMPLANT Left 7/3/2017    Procedure: PHACOEMULSIFICATION WITH STANDARD INTRAOCULAR LENS IMPLANT;   Left cataract removal with implant;  Surgeon: Dominguez Payne MD;  Location: [facility] OR     PHACOEMULSIFICATION WITH STANDARD INTRAOCULAR LENS IMPLANT Right 7/24/2017    Procedure: PHACOEMULSIFICATION WITH STANDARD INTRAOCULAR LENS IMPLANT;  Right cataract removal with implant;  Surgeon: Dominguez Payne MD;  Location: [facility] OR       Social History:  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     Family History:  No family hx of atopic dermatitis, hay fever, psoriasis    Medications:  Current Outpatient Prescriptions   Medication Sig Dispense Refill     Alitretinoin 0.1 % GEL Externally apply topically 2 times daily 60 g 3     betamethasone dipropionate (DIPROSONE) 0.05 % ointment Apply topically 2 times daily 50 g 3     BuPROPion HCl (WELLBUTRIN PO)        Calcium carb-Vitamin D 500 mg [place]-200 units (OSCAL WITH D;OYSTER SHELL CALCIUM) 500-200 MG-UNIT per tablet Take 1 tablet by mouth daily       Dupilumab (DUPIXENT) 300 MG/2ML syringe Inject 2 mLs (300 mg) Subcutaneous every 14 days 4 mL 5     FLUOXETINE HCL PO        methocarbamol (ROBAXIN) 500 MG tablet Take 500 mg by mouth       naproxen (NAPROSYN) 500 MG tablet Take 500 mg by mouth       augmented betamethasone dipropionate (DIPROLENE-AF) 0.05 % ointment Apply to affected areas of hand twice daily, everyday. Use Vaniply on top of this medication. Occlude with gloves at night. (Patient not taking: Reported on 8/27/2018) 50 g 3     clobetasol (TEMOVATE) 0.05 % ointment Apply thin layer twice daily to hands and feet.  Soak hands before application at bedtime. (Patient not taking: Reported on 8/27/2018) 60 g 11     Dupilumab (DUPIXENT) 300 MG/2ML syringe Inject 2 mLs (300 mg) Subcutaneous every 14 days (Patient not taking: Reported on 8/27/2018) 2 mL 4     methylPREDNISolone (MEDROL DOSEPAK) 4 MG tablet Follow package instructions (Patient not taking: Reported on 8/27/2018) 21 tablet 0     predniSONE (DELTASONE) 1 MG tablet Take 8 tablets (8 mg) by  mouth daily (Patient not taking: Reported on 8/27/2018) 240 tablet 3     predniSONE (DELTASONE) 10 MG tablet Take 1 tablet (10 mg) by mouth daily (Patient not taking: Reported on 8/27/2018) 7 tablet 0     predniSONE (DELTASONE) 10 MG tablet Take 40mg [id number] then [id number],, then 20mg qday until seen. (Patient not taking: Reported on 8/27/2018) 80 tablet 0     predniSONE (DELTASONE) 5 MG tablet 8,8,7,7,6,6,5,5,4,4,3,3,2,2,1,1, (Patient not taking: Reported on 8/27/2018) 72 tablet 0     predniSONE (DELTASONE) 5 MG tablet Take 1 tablet (5 mg) by mouth daily (Patient not taking: Reported on 8/27/2018) 30 tablet 3     tacrolimus (PROTOPIC) 0.1 % ointment Apply topically 2 times daily (Patient not taking: Reported on 10/8/2018) 60 g 3        Allergies   Allergen Reactions     Sulfadiazine      Patient declines this allergy. Other reaction(s): Hives/ Urticaria - Allergy     Tramadol      Patient declines this allergy. Other reaction(s): Shortness Of Breath/Wheezing/Chest Tightness - Allergy     Atorvastatin      Patient declines this allergy. Other reaction(s): Rash - Allergy     Bronopol Other (See Comments)     Previous Positive (+) skin patch test     Bacitracin Rash     Patch testing     Glutaraldehyde Rash     2+ patch testing     Gold Rash     Mild patch test reaction     Insulin Detemir      Patient declined this allergy. Other reaction(s): Headaches/ Migraines - Intolerance     Lisinopril      Patient declined this allergy. Other reaction(s): Headaches/ Migraines - Intolerance     Penicillins Rash     Sulfa Drugs Rash         Review of Systems:  -As per HPI  -Constitutional: The patient denies fatigue, fevers, chills, unintended weight loss, and night sweats.  -HEENT: Patient denies nonhealing oral sores.  -Skin: As above in HPI. No additional skin concerns.    Physical exam:  Vitals: There were no vitals taken for this visit.  GEN: This is a well developed, well-nourished female in no acute distress, in a  pleasant mood.    SKIN: Focused examination of the palms and soles was performed.  -Hyperkeratosis of palms and soles of feet, some small fissuring, minimal erythema, tender to palpation near fissures, as expected.  --> overall minimally improved from last month.        Order for PATCH TESTS      [] Inpatient / [place]....   Bed ....  [x] Outpatient    Skin Atopy           :       [x] Yes   [] No  Rhinitis/Sinusitis :       [x] Yes   [] No  Allergic Asthma   :       [] Yes   [] No  Leg ulcers            :       [] Yes   [x] No  Hand eczema       :       [x] Yes   [] No    Leading hand :  [] R [] L               [] Ambidextrous                      Reason for tests (Suspected allergy): contact allergy on hands/feet or Psoriasis  Known previous allergies: ...    [] Standardized panels  [x] Standard panel (40 tests)  [x] Preservatives & Antimicrobials (31 tests)  [x] Emulsifiers & Additives (25 tests)   [] Perfumes/Flavours & Plants (25 tests)  [] Hairdresser panel (12 tests)  [] Rubber Chemicals (22 tests)  [] Plastics (26 tests)  [x] Colorants/Dyes/Food additives (20 tests)  [] Metals (implants/dental) (23 tests)  [] Local anaesthetics/NSAIDs (12 tests)  [] Antibiotics & Antimycotics (14 tests)   [x] Corticosteroids (15 tests)   [] Photopatch test (32 tests)   [] others: ...    [] Patient's own products: ...    DO NOT test if chemical or biological identity is unknown!     always ask from patient the product information and safety sheets (MSDS)     [] Patient needs consultation with Allergy team  [x] Tests discussed with Allergy team (can have direct appointment for patch tests)    ==> put on back the patch tests on 07/23/2018 (added Colorants and dyes series --> patient thinks that dyes in money might play a role)  --> 1st readings on 07/25/2018: all tests were well sticking to the end and no skin reaction in any of the test fields. However, some itching over the corticosteroid series  --> 2nd readings on 07/27/2018:  compositae +-++    RESULTS & EVALUATION of PATCH TESTS      Patch test readings after     [x] 2 days, [] 3 days [x] 4 days, [] 5 days,    [x] Allergic reaction diagnosed against: +-++ reaction to Compositae mix      Interpretation/ remarks:   Basically, all patch tests negatives. Some reaction to the compositae mix, but not sure how re levant this is. Patient cannot make a connection and does not recall that she has regular contact with plant extract.   However, patient has a generalised dry skin and this could indicate for an atopic dermatitis without relevant contact dermatitis. This might be an indication for treatment with Dupilumab.     [x] Patient information given   [x] SmartPractice information    [x] Treatment prescribed: maybe try again treatment with Dupilumab. PUVA treatment or radiotherapy would be a possibility as well. Appeal to insurance for Dupilumab treatment.   For the time being topical treatement: 10% OTC urea cream to palms and soles of feet and Vaseline and uses betamethasone ointment ==> use from Monday to Friday regularly every evening and maybe morning on hands Protopic 0.1% ointment (Tacrolimus)    Diagnosis:  ==> Chronic irritant hand and foot eczema probably based on atopic dermatitis2/2 toxic irritant vs allergic vs atopic vs psoriaform  --> unlikely fungal (no response to Itraconazole) -->  in culture Sagrahamala species isolated (contaminant?)   --> unlikely contact dermatitis (patch tests without relevant sensibilisation)  --> psoriasis not excluded, but not likely    ==> reordered Dupilumab. Patient had previously co-payment with Dupixent of 500$ per dose and this is not possible for patient. Re-ordered Dupixent in the hope that patient has no co-payment because we have now with the negative patch tests more arguments for an atopic dermatitis of hands and feet without relevant contact allergy. Therefore, trial with Dupixent even more important.  --> until then continue treatment  with Protopic 0.1% every evening (and morning) from Monday to Friday and Betamethasone ointment Saturday and Sunday.   --> during the day Vaseline and maybe 10 Urea cream  --> in [place] Alitretinoin Gel available, try that first instead of Protopic (2 times daily)    ==> Tazorac (Alitretinoin topically) and Dupilumab would be too expensive for the patient. Insurance doesn't have lots of co-payment. Would be more than $1'600.     Try Acitretine 25mg every day together with Protopic and Betamethasone and Urea cream. If insurance does not pay Acitretine, then maybe try CyA or Methotrexate    Follow up in 3-4 weeks to discuss and evaluate then liver function.      Lester Aranda MD

## 2018-10-08 NOTE — NURSING NOTE
Dermatology Rooming Note    Kasey Ibrahim's goals for this visit include:   Chief Complaint   Patient presents with     Allergic Reaction     Kasey is here to follow up regarding her palms and sloes of feet. She states that there has been no improvement since she has been able to get medications     Regine Yancey LPN

## 2018-10-08 NOTE — MR AVS SNAPSHOT
After Visit Summary   10/8/2018    Kasey Ibrahim    MRN: [id number]           Patient Information     Date Of Birth          1950        Visit Information        Provider Department      10/8/2018 8:30 AM Lester Aranda MD [facility] Dermatology        Today's Diagnoses     Chronic eczema of hand    -  1       Follow-ups after your visit        Your next 10 appointments already scheduled     Oct 08, 2018  8:30 AM CDT   (Arrive by 8:15 AM)   Return Allergy with MD CLARICE Delgado [facility] Dermatology ([facility])    [address] [place] [id number] [phone]            Nov 05, 2018  8:30 AM CST   (Arrive by 8:15 AM)   Return Allergy with MD CLARICE Delgado [facility] D[facility] ([facility])    [address] [place] [id number] [phone]              Who to contact     Please call your clinic at [phone] to:    Ask questions about your health    Make or cancel appointments    Discuss your medicines    Learn about your test results    Speak to your doctor            Additional Information About Your Visit        [employer] Information     [employer] gives you secure access to your electronic health record. If you see a primary care provider, you can also send messages to your care team and make appointments. If you have questions, please call your primary care clinic.  If you do not have a primary care provider, please call [phone] and they will assist you.      [employer] is an electronic gateway that provides easy, online access to your medical records. With [employer], you can request a clinic appointment, read your test results, renew a prescription or communicate with your care team.     To access your existing account, please contact your [facility] or call [phone] for assistance.        Care EveryWhere ID     This is your Care EveryWhere ID.  This could be used by other organizations to access your [facility] medical records  [id number]         Blood Pressure from Last 3 Encounters:   07/23/18 126/79   07/24/17 114/63   07/03/17 115/59    Weight from Last 3 Encounters:   07/24/17 78.9 kg (174 lb)   07/03/17 77.1 kg (170 lb)   03/20/17 80.6 kg (177 lb 12.8 oz)              Today, you had the following     No orders found for display         Today's Medication Changes          These changes are accurate as of 10/8/18  8:12 AM.  If you have any questions, ask your nurse or doctor.               Start taking these medicines.        Dose/Directions    acitretin 25 MG Caps capsule   Commonly known as:  SORIATANE   Used for:  Chronic eczema of hand   Started by:  Lester Aranda MD        Take as directed   Quantity:  30 capsule   Refills:  1         These medicines have changed or have updated prescriptions.        Dose/Directions    * betamethasone dipropionate 0.05 % ointment   Commonly known as:  DIPROSONE   This may have changed:  Another medication with the same name was added. Make sure you understand how and when to take each.   Used for:  Contact dermatitis due to other agent, unspecified contact dermatitis type   Changed by:  Lester Aranda MD        Apply topically 2 times daily   Quantity:  50 g   Refills:  3       * betamethasone dipropionate 0.05 % ointment   Commonly known as:  DIPROSONE   This may have changed:  You were already taking a medication with the same name, and this prescription was added. Make sure you understand how and when to take each.   Used for:  Chronic eczema of hand   Changed by:  Lester Aranda MD        Apply topically twice a week   Quantity:  50 g   Refills:  3       * tacrolimus 0.1 % ointment   Commonly known as:  PROTOPIC   This may have changed:  Another medication with the same name was added. Make sure you understand how and when to take each.   Used for:  Contact dermatitis due to other agent,  unspecified contact dermatitis type   Changed by:  Lester Aranda MD        Apply topically 2 times daily   Quantity:  60 g   Refills:  3       * tacrolimus 0.1 % ointment   Commonly known as:  PROTOPIC   This may have changed:  You were already taking a medication with the same name, and this prescription was added. Make sure you understand how and when to take each.   Used for:  Chronic eczema of hand   Changed by:  Lester Aranda MD        Apply topically 2 times daily   Quantity:  60 g   Refills:  3       * Notice:  This list has 4 medication(s) that are the same as other medications prescribed for you. Read the directions carefully, and ask your doctor or other care provider to review them with you.         Where to get your medicines      These medications were sent to [facility] [address] [place], [place] - [address]  [address] [facility] [address]     Phone:  [phone]     acitretin 25 MG Caps capsule    betamethasone dipropionate 0.05 % ointment    tacrolimus 0.1 % ointment                Primary Care Provider Office Phone # Fax #    Ankit Dominguez Martini [phone]       [facility] [address] [facility] [address]        Equal Access to Services     LISA BALLARD AH: Gala weeks Sokasandra, waaxda dwayne, qaybta kaalmada aderosalioda, rut figueroa. So [facility] [phone].    ATENCIÓN: Si habla español, tiene a young disposición servicios gratuitos de asistencia lingüística. [facility] [phone].    We comply with applicable federal civil rights laws and [place] laws. We do not discriminate on the basis of race, color, national origin, age, disability, sex, sexual orientation, or gender identity.            Thank you!     Thank you for choosing [facility] DERMATOLOGY  for your care. Our goal is always to provide you with excellent care. Hearing back from our patients is one way we can continue to improve our services.  Please take a few minutes to complete the written survey that you may receive in the mail after your visit with us. Thank you!             Your Updated Medication List - Protect others around you: Learn how to safely use, store and throw away your medicines at www.disposemymeds.org.          This list is accurate as of 10/8/18  8:12 AM.  Always use your most recent med list.                   Brand Name Dispense Instructions for use Diagnosis    acitretin 25 MG Caps capsule    SORIATANE    30 capsule    Take as directed    Chronic eczema of hand       Alitretinoin 0.1 % Gel     60 g    Externally apply topically 2 times daily    Other atopic dermatitis       augmented betamethasone dipropionate 0.05 % ointment    DIPROLENE-AF    50 g    Apply to affected areas of hand twice daily, everyday. Use Vaniply on top of this medication. Occlude with gloves at night.    Allergic contact dermatitis due to drugs in contact with skin       * betamethasone dipropionate 0.05 % ointment    DIPROSONE    50 g    Apply topically 2 times daily    Contact dermatitis due to other agent, unspecified contact dermatitis type       * betamethasone dipropionate 0.05 % ointment    DIPROSONE    50 g    Apply topically twice a week    Chronic eczema of hand       calcium carbonate 500 mg-vitamin D 200 units 500-200 MG-UNIT per tablet    OSCAL with D;OYSTER SHELL CALCIUM     Take 1 tablet by mouth daily        clobetasol 0.05 % ointment    TEMOVATE    60 g    Apply thin layer twice daily to hands and feet.  Soak hands before application at bedtime.    Allergic contact dermatitis, unspecified trigger, Chronic dermatitis of hands       * Dupilumab 300 MG/2ML syringe    DUPIXENT    2 mL    Inject 2 mLs (300 mg) Subcutaneous every 14 days    Hand dermatitis, Foot dermatitis       * Dupilumab 300 MG/2ML syringe    DUPIXENT    4 mL    Inject 2 mLs (300 mg) Subcutaneous every 14 days    Other atopic dermatitis       FLUOXETINE HCL PO            methocarbamol 500 MG tablet    ROBAXIN     Take 500 mg by mouth        methylPREDNISolone 4 MG tablet    MEDROL DOSEPAK    21 tablet    Follow package instructions    Chronic dermatitis of hands       naproxen 500 MG tablet    NAPROSYN     Take 500 mg by mouth        * predniSONE 10 MG tablet    DELTASONE    80 tablet    Take 40mg [id number] then [id number],, then 20mg qday until seen.    Chronic dermatitis of hands, Allergic contact dermatitis, unspecified trigger       * predniSONE 5 MG tablet    DELTASONE    30 tablet    Take 1 tablet (5 mg) by mouth daily    Allergic contact dermatitis, unspecified trigger       * predniSONE 1 MG tablet    DELTASONE    240 tablet    Take 8 tablets (8 mg) by mouth daily    Allergic contact dermatitis due to drugs in contact with skin       * predniSONE 10 MG tablet    DELTASONE    7 tablet    Take 1 tablet (10 mg) by mouth daily    Allergic contact dermatitis, unspecified trigger       * predniSONE 5 MG tablet    DELTASONE    72 tablet    8,8,7,7,6,6,5,5,4,4,3,3,2,2,1,1,    Allergic contact dermatitis, unspecified trigger       * tacrolimus 0.1 % ointment    PROTOPIC    60 g    Apply topically 2 times daily    Contact dermatitis due to other agent, unspecified contact dermatitis type       * tacrolimus 0.1 % ointment    PROTOPIC    60 g    Apply topically 2 times daily    Chronic eczema of hand       WELLBUTRIN PO           * Notice:  This list has 11 medication(s) that are the same as other medications prescribed for you. Read the directions carefully, and ask your doctor or other care provider to review them with you.

## 2018-10-30 NOTE — TELEPHONE ENCOUNTER
Called patient to discuss. She states that the eczema on her hands has flared significantly and she requested a prescription for prednisone. Her next follow-up is scheduled for 6/11/18 with Dr. Aranda, and she wonders if she should be seen sooner.   I discussed the case with Dr. Arora, and we elected to send patient a methylprednisolone dose pack to hold her over until next week. Dr. Zuniga, who knows the patient well, will return then from being out of town and may have some additional recommendations and can also comment on whether she thinks a sooner follow up appointment is warranted. Will route this note to Dr. Zuniga to make her aware of patient's concerns.      Sera Fall MD  Dermatology Resident PGY2   October 30, 2018      Darin Isaac MD  [address]  [facility] [address]           [facility] - Otorhinolaryngology  [address]  [facility] [address]  Phone: [phone]  Fax: [phone]          Patient: Argelia Luevano   MR Number: [id number]   YOB: 2014   Date of Visit: 10/30/2018       Dear Dr. Darin Isaac:    Thank you for referring Argelia Luevano to me for evaluation. Attached you will find relevant portions of my assessment and plan of care.    If you have questions, please do not hesitate to call me. I look forward to following Argelia Luevano along with you.    Sincerely,    Alicia Alejandro, NP    Enclosure  CC:  No Recipients    If you would like to receive this communication electronically, please contact [email or web address] or [phone] to request more information on [employer] Link access.    For providers and/or their staff who would like to refer a patient to [facility], please contact us through our one-stop-shop provider referral line, [facility], at [phone].    If you feel you have received this communication in error or would no longer like to receive these types of communications, please e-mail [email or web address]

## 2018-11-05 ENCOUNTER — OFFICE VISIT (OUTPATIENT)
Dept: DERMATOLOGY | Facility: CLINIC | Age: 68
End: 2018-11-05
Payer: COMMERCIAL

## 2018-11-05 DIAGNOSIS — Z79.899 ENCOUNTER FOR LONG-TERM (CURRENT) USE OF HIGH-RISK MEDICATION: ICD-10-CM

## 2018-11-05 DIAGNOSIS — Z79.899 ENCOUNTER FOR LONG-TERM (CURRENT) USE OF HIGH-RISK MEDICATION: Primary | ICD-10-CM

## 2018-11-05 DIAGNOSIS — L30.9 CHRONIC ECZEMA OF HAND: ICD-10-CM

## 2018-11-05 LAB
ALBUMIN SERPL-MCNC: 3.7 G/DL (ref 3.4–5)
ALP SERPL-CCNC: 62 U/L (ref 40–150)
ALT SERPL W P-5'-P-CCNC: 20 U/L (ref 0–50)
AST SERPL W P-5'-P-CCNC: 18 U/L (ref 0–45)
BASOPHILS # BLD AUTO: 0 10E9/L (ref 0–0.2)
BASOPHILS NFR BLD AUTO: 0.8 %
BILIRUB DIRECT SERPL-MCNC: 0.1 MG/DL (ref 0–0.2)
BILIRUB SERPL-MCNC: 0.4 MG/DL (ref 0.2–1.3)
DIFFERENTIAL METHOD BLD: NORMAL
EOSINOPHIL # BLD AUTO: 0.2 10E9/L (ref 0–0.7)
EOSINOPHIL NFR BLD AUTO: 3.9 %
ERYTHROCYTE [DISTWIDTH] IN BLOOD BY AUTOMATED COUNT: 12.6 % (ref 10–15)
HCT VFR BLD AUTO: 42 % (ref 35–47)
HGB BLD-MCNC: 13.8 G/DL (ref 11.7–15.7)
IMM GRANULOCYTES # BLD: 0 10E9/L (ref 0–0.4)
IMM GRANULOCYTES NFR BLD: 0.2 %
LYMPHOCYTES # BLD AUTO: 1.2 10E9/L (ref 0.8–5.3)
LYMPHOCYTES NFR BLD AUTO: 24.8 %
MCH RBC QN AUTO: 31.2 PG (ref 26.5–33)
MCHC RBC AUTO-ENTMCNC: 32.9 G/DL (ref 31.5–36.5)
MCV RBC AUTO: 95 FL (ref 78–100)
MONOCYTES # BLD AUTO: 0.6 10E9/L (ref 0–1.3)
MONOCYTES NFR BLD AUTO: 12 %
NEUTROPHILS # BLD AUTO: 2.8 10E9/L (ref 1.6–8.3)
NEUTROPHILS NFR BLD AUTO: 58.3 %
NRBC # BLD AUTO: 0 10*3/UL
NRBC BLD AUTO-RTO: 0 /100
PLATELET # BLD AUTO: 254 10E9/L (ref 150–450)
PROT SERPL-MCNC: 7.5 G/DL (ref 6.8–8.8)
RBC # BLD AUTO: 4.42 10E12/L (ref 3.8–5.2)
WBC # BLD AUTO: 4.8 10E9/L (ref 4–11)

## 2018-11-05 RX ORDER — METHOTREXATE 2.5 MG/1
15 TABLET ORAL WEEKLY
Qty: 24 TABLET | Refills: 1 | Status: SHIPPED | OUTPATIENT
Start: 2018-11-05 | End: 2018-12-10

## 2018-11-05 ASSESSMENT — PAIN SCALES - GENERAL: PAINLEVEL: NO PAIN (0)

## 2018-11-05 NOTE — LETTER
11/5/2018       RE: Kasey Ibrahim  [id number] [facility] [id number]     Dear Colleague,    Thank you for referring your patient, Kasey Ibrahim, to the [facility] DERMATOLOGY at [facility]. Please see a copy of my visit note below.    [facility] Dermatology Note      Dermatology Problem List:  1.Chronic hand and foot eczema 2/2 toxic irritant vs allergic vs atopic psoriaform   - currently on acitretin 25 mg PO every day     Encounter Date: Nov 5, 2018    CC:   Chief Complaint   Patient presents with     Derm Problem     Kasey is here today for a eczema follow up- notes no improvment.          History of Present Illness:  Ms. Kasey Ibrahim is a 68 year old female who presents with chronic hand and foot eczema for past 4.5 years.  She has tried and failed: nb uvb, MMF, acitretin, methotrexate (all prior to presenting at the ), minocycline ( 8/2016-3/20/17), prednisone, clobetasol 0.05% ointment, CSA 3mg/kg/day. She just finished a medrol dose pack, which helped.  She had previous allergy patch test in 2013 where she was only found to have +1 to gold and bacitracin, in other words, negative patch test. (See [place] records from [facility]). She did have one single dose of 600 mg dupilumab earlier in 2018 without significant improvement. She was unable to continue the dupilumab tx due to the high co-payment. No personal or family hx of psoriasis or hay fever.  She uses T gel for posterior scalp itching, which helps.     Interval Hx:  Patient reports that nothing has really changed for her hands and feet. She states that she has been unable to try dupilumab or acitretin again because despite her insurance coverage, these medications are still cost prohibitive for her. She says she uses a lotion that has a little bit of vaseline in it daily. She has been using betamethasone and triamcinolone ointments. She has been alternating their use. She tried  an OTC GoldBond type product for her hands, which also wasn't very helpful. Patient has lots of pain and sometimes blood on fingers and heels. No other skin concerns today.    Past Medical History:   Patient Active Problem List   Diagnosis     Cholangitis     Past Medical History:   Diagnosis Date     Depression      Hand dermatitis      Past Surgical History:   Procedure Laterality Date     FOOT SURGERY       PHACOEMULSIFICATION WITH STANDARD INTRAOCULAR LENS IMPLANT Left 7/3/2017    Procedure: PHACOEMULSIFICATION WITH STANDARD INTRAOCULAR LENS IMPLANT;  Left cataract removal with implant;  Surgeon: Dominguez Payne MD;  Location: [facility] OR     PHACOEMULSIFICATION WITH STANDARD INTRAOCULAR LENS IMPLANT Right 7/24/2017    Procedure: PHACOEMULSIFICATION WITH STANDARD INTRAOCULAR LENS IMPLANT;  Right cataract removal with implant;  Surgeon: Dominguez Payne MD;  Location: [facility] OR       Social History:  The patient works for [employer] as a .     Family History:  No family hx of atopic dermatitis, hay fever, psoriasis    Medications:  Current Outpatient Prescriptions   Medication Sig Dispense Refill     acitretin (SORIATANE) 25 MG CAPS capsule Take as directed 30 capsule 1     Alitretinoin 0.1 % GEL Externally apply topically 2 times daily 60 g 3     betamethasone dipropionate (DIPROSONE) 0.05 % ointment Apply topically twice a week 50 g 3     betamethasone dipropionate (DIPROSONE) 0.05 % ointment Apply topically 2 times daily 50 g 3     BuPROPion HCl (WELLBUTRIN PO)        Calcium carb-Vitamin D 500 mg [place]-200 units (OSCAL WITH D;OYSTER SHELL CALCIUM) 500-200 MG-UNIT per tablet Take 1 tablet by mouth daily       Dupilumab (DUPIXENT) 300 MG/2ML syringe Inject 2 mLs (300 mg) Subcutaneous every 14 days 4 mL 5     FLUOXETINE HCL PO        methocarbamol (ROBAXIN) 500 MG tablet Take 500 mg by mouth       naproxen (NAPROSYN) 500 MG tablet Take 500 mg by mouth       tacrolimus (PROTOPIC) 0.1 % ointment Apply  topically 2 times daily 60 g 3     augmented betamethasone dipropionate (DIPROLENE-AF) 0.05 % ointment Apply to affected areas of hand twice daily, everyday. Use Vaniply on top of this medication. Occlude with gloves at night. (Patient not taking: Reported on 8/27/2018) 50 g 3     clobetasol (TEMOVATE) 0.05 % ointment Apply thin layer twice daily to hands and feet.  Soak hands before application at bedtime. (Patient not taking: Reported on 8/27/2018) 60 g 11     Dupilumab (DUPIXENT) 300 MG/2ML syringe Inject 2 mLs (300 mg) Subcutaneous every 14 days (Patient not taking: Reported on 8/27/2018) 2 mL 4     methylPREDNISolone (MEDROL DOSEPAK) 4 MG tablet Follow package instructions (Patient not taking: Reported on 8/27/2018) 21 tablet 0     predniSONE (DELTASONE) 1 MG tablet Take 8 tablets (8 mg) by mouth daily (Patient not taking: Reported on 8/27/2018) 240 tablet 3     predniSONE (DELTASONE) 10 MG tablet Take 1 tablet (10 mg) by mouth daily (Patient not taking: Reported on 8/27/2018) 7 tablet 0     predniSONE (DELTASONE) 10 MG tablet Take 40mg [id number] then [id number],, then 20mg qday until seen. (Patient not taking: Reported on 8/27/2018) 80 tablet 0     predniSONE (DELTASONE) 5 MG tablet 8,8,7,7,6,6,5,5,4,4,3,3,2,2,1,1, (Patient not taking: Reported on 8/27/2018) 72 tablet 0     predniSONE (DELTASONE) 5 MG tablet Take 1 tablet (5 mg) by mouth daily (Patient not taking: Reported on 8/27/2018) 30 tablet 3     tacrolimus (PROTOPIC) 0.1 % ointment Apply topically 2 times daily (Patient not taking: Reported on 10/8/2018) 60 g 3        Allergies   Allergen Reactions     Sulfadiazine      Patient declines this allergy. Other reaction(s): Hives/ Urticaria - Allergy     Tramadol      Patient declines this allergy. Other reaction(s): Shortness Of Breath/Wheezing/Chest Tightness - Allergy     Atorvastatin      Patient declines this allergy. Other reaction(s): Rash - Allergy     Bronopol Other (See Comments)     Previous  Positive (+) skin patch test     Bacitracin Rash     Patch testing     Glutaraldehyde Rash     2+ patch testing     Gold Rash     Mild patch test reaction     Insulin Detemir      Patient declined this allergy. Other reaction(s): Headaches/ Migraines - Intolerance     Lisinopril      Patient declined this allergy. Other reaction(s): Headaches/ Migraines - Intolerance     Penicillins Rash     Sulfa Drugs Rash         Review of Systems:  -As per HPI  -Constitutional: The patient denies fatigue, fevers, chills, unintended weight loss, and night sweats.  -HEENT: Patient denies nonhealing oral sores.  -Skin: As above in HPI. No additional skin concerns.    Physical exam:  Vitals: There were no vitals taken for this visit.  GEN: This is a well developed, well-nourished female in no acute distress, in a pleasant mood.    SKIN: Focused examination of the palms and soles was performed.  -Hyperkeratosis of palms and soles of feet, some small fissuring, minimal erythema, tender to palpation near fissures, as expected.   --> overall minimally improved from last month.        Order for PATCH TESTS      [] Inpatient / [place]....   Bed ....  [x] Outpatient    Skin Atopy           :       [x] Yes   [] No  Rhinitis/Sinusitis :       [x] Yes   [] No  Allergic Asthma   :       [] Yes   [] No  Leg ulcers            :       [] Yes   [x] No  Hand eczema       :       [x] Yes   [] No    Leading hand :  [] R [] L               [] Ambidextrous                      Reason for tests (Suspected allergy): contact allergy on hands/feet or Psoriasis  Known previous allergies: ...    [] Standardized panels  [x] Standard panel (40 tests)  [x] Preservatives & Antimicrobials (31 tests)  [x] Emulsifiers & Additives (25 tests)   [] Perfumes/Flavours & Plants (25 tests)  [] Hairdresser panel (12 tests)  [] Rubber Chemicals (22 tests)  [] Plastics (26 tests)  [x] Colorants/Dyes/Food additives (20 tests)  [] Metals (implants/dental) (23 tests)  [] Local  anaesthetics/NSAIDs (12 tests)  [] Antibiotics & Antimycotics (14 tests)   [x] Corticosteroids (15 tests)   [] Photopatch test (32 tests)   [] others: ...    [] Patient's own products: ...    DO NOT test if chemical or biological identity is unknown!     always ask from patient the product information and safety sheets (MSDS)     [] Patient needs consultation with Allergy team  [x] Tests discussed with Allergy team (can have direct appointment for patch tests)    ==> put on back the patch tests on 07/23/2018 (added Colorants and dyes series --> patient thinks that dyes in money might play a role)  --> 1st readings on 07/25/2018: all tests were well sticking to the end and no skin reaction in any of the test fields. However, some itching over the corticosteroid series  --> 2nd readings on 07/27/2018: compositae +-++    RESULTS & EVALUATION of PATCH TESTS      Patch test readings after     [x] 2 days, [] 3 days [x] 4 days, [] 5 days,    [x] Allergic reaction diagnosed against: +-++ reaction to Compositae mix      Interpretation/ remarks:   Basically, all patch tests negatives. Some reaction to the compositae mix, but not sure how re levant this is. Patient cannot make a connection and does not recall that she has regular contact with plant extract.   However, patient has a generalised dry skin and this could indicate for an atopic dermatitis without relevant contact dermatitis. This might be an indication for treatment with Dupilumab.     [x] Patient information given   [x] SmartPractice information    [x] Treatment prescribed: maybe try again treatment with Dupilumab. PUVA treatment or radiotherapy would be a possibility as well. Appeal to insurance for Dupilumab treatment.   For the time being topical treatement: 10% OTC urea cream to palms and soles of feet and Vaseline and uses betamethasone ointment ==> use from Monday to Friday regularly every evening and maybe morning on hands Protopic 0.1% ointment  (Tacrolimus)    Diagnosis:    ==> Chronic irritant hand and foot eczema probably based on atopic dermatitis2/2 toxic irritant vs allergic vs atopic vs psoriaform  --> unlikely fungal (no response to Itraconazole) -->  in culture Sagrahamala species isolated (contaminant?)   --> unlikely contact dermatitis (patch tests without relevant sensibilisation)  --> psoriasis not excluded, but not likely    Patient had previously co-payment with Dupixent of 500$ per dose and this is not possible for patient.     - will have patient start methotrexate 15 mg weekly (patient may take 5 mg on morning, evening, morning)  - CBC w/diff and hepatic panel today    ==> Tazorac (Alitretinoin topically) and Dupilumab would be too expensive for the patient. Insurance doesn't have lots of co-payment. Would be more than $1'600. The same for Acitretine.    Follow up in 6 weeks to discuss and evaluate then liver function.    Staff Physician Comments:  I saw and evaluated the patient with the resident and I agree with the assessment and plan as documented in the resident's note.    Lester Aranda MD  Professor  Head of Dermato-Allergy Division  Department of Dermatology  [facility], [facility]      Bao Jackson MD, PhD  Medicine-Dermatology PGY-3

## 2018-11-05 NOTE — PROGRESS NOTES
[facility] Dermatology Note      Dermatology Problem List:  1.Chronic hand and foot eczema 2/2 toxic irritant vs allergic vs atopic psoriaform   - currently on acitretin 25 mg PO every day     Encounter Date: Nov 5, 2018    CC:   Chief Complaint   Patient presents with     Derm Problem     Kasey is here today for a eczema follow up- notes no improvment.          History of Present Illness:  Ms. Kasey Ibrahim is a 68 year old female who presents with chronic hand and foot eczema for past 4.5 years.  She has tried and failed: nb uvb, MMF, acitretin, methotrexate (all prior to presenting at the ), minocycline ( 8/2016-3/20/17), prednisone, clobetasol 0.05% ointment, CSA 3mg/kg/day. She just finished a medrol dose pack, which helped.  She had previous allergy patch test in 2013 where she was only found to have +1 to gold and bacitracin, in other words, negative patch test. ([place] [facility] [place] [facility] She did have one single dose of 600 mg dupilumab earlier in 2018 without significant improvement. She was unable to continue the dupilumab tx due to the high co-payment. No personal or family hx of psoriasis or hay fever.  She uses T gel for posterior scalp itching, which helps.     Interval Hx:  Patient reports that nothing has really changed for her hands and feet. She states that she has been unable to try dupilumab or acitretin again because despite her insurance coverage, these medications are still cost prohibitive for her. She says she uses a lotion that has a little bit of vaseline in it daily. She has been using betamethasone and triamcinolone ointments. She has been alternating their use. She tried an OTC GoldBond type product for her hands, which also wasn't very helpful. Patient has lots of pain and sometimes blood on fingers and heels. No other skin concerns today.    Past Medical History:   Patient Active Problem List   Diagnosis     Cholangitis     Past Medical History:    Diagnosis Date     Depression      Hand dermatitis      Past Surgical History:   Procedure Laterality Date     FOOT SURGERY       PHACOEMULSIFICATION WITH STANDARD INTRAOCULAR LENS IMPLANT Left 7/3/2017    Procedure: PHACOEMULSIFICATION WITH STANDARD INTRAOCULAR LENS IMPLANT;  Left cataract removal with implant;  Surgeon: Dominguez Payne MD;  Location: [facility] OR     PHACOEMULSIFICATION WITH STANDARD INTRAOCULAR LENS IMPLANT Right 7/24/2017    Procedure: PHACOEMULSIFICATION WITH STANDARD INTRAOCULAR LENS IMPLANT;  Right cataract removal with implant;  Surgeon: Dominguez Payne MD;  Location: [facility] OR       Social History:  The patient works for [employer] as a .     Family History:  No family hx of atopic dermatitis, hay fever, psoriasis    Medications:  Current Outpatient Prescriptions   Medication Sig Dispense Refill     acitretin (SORIATANE) 25 MG CAPS capsule Take as directed 30 capsule 1     Alitretinoin 0.1 % GEL Externally apply topically 2 times daily 60 g 3     betamethasone dipropionate (DIPROSONE) 0.05 % ointment Apply topically twice a week 50 g 3     betamethasone dipropionate (DIPROSONE) 0.05 % ointment Apply topically 2 times daily 50 g 3     BuPROPion HCl (WELLBUTRIN PO)        Calcium carb-Vitamin D 500 mg [place]-200 units (OSCAL WITH D;OYSTER SHELL CALCIUM) 500-200 MG-UNIT per tablet Take 1 tablet by mouth daily       Dupilumab (DUPIXENT) 300 MG/2ML syringe Inject 2 mLs (300 mg) Subcutaneous every 14 days 4 mL 5     FLUOXETINE HCL PO        methocarbamol (ROBAXIN) 500 MG tablet Take 500 mg by mouth       naproxen (NAPROSYN) 500 MG tablet Take 500 mg by mouth       tacrolimus (PROTOPIC) 0.1 % ointment Apply topically 2 times daily 60 g 3     augmented betamethasone dipropionate (DIPROLENE-AF) 0.05 % ointment Apply to affected areas of hand twice daily, everyday. Use Vaniply on top of this medication. Occlude with gloves at night. (Patient not taking: Reported on 8/27/2018) 50 g 3      clobetasol (TEMOVATE) 0.05 % ointment Apply thin layer twice daily to hands and feet.  Soak hands before application at bedtime. (Patient not taking: Reported on 8/27/2018) 60 g 11     Dupilumab (DUPIXENT) 300 MG/2ML syringe Inject 2 mLs (300 mg) Subcutaneous every 14 days (Patient not taking: Reported on 8/27/2018) 2 mL 4     methylPREDNISolone (MEDROL DOSEPAK) 4 MG tablet Follow package instructions (Patient not taking: Reported on 8/27/2018) 21 tablet 0     predniSONE (DELTASONE) 1 MG tablet Take 8 tablets (8 mg) by mouth daily (Patient not taking: Reported on 8/27/2018) 240 tablet 3     predniSONE (DELTASONE) 10 MG tablet Take 1 tablet (10 mg) by mouth daily (Patient not taking: Reported on 8/27/2018) 7 tablet 0     predniSONE (DELTASONE) 10 MG tablet Take 40mg [id number] then [id number],, then 20mg qday until seen. (Patient not taking: Reported on 8/27/2018) 80 tablet 0     predniSONE (DELTASONE) 5 MG tablet 8,8,7,7,6,6,5,5,4,4,3,3,2,2,1,1, (Patient not taking: Reported on 8/27/2018) 72 tablet 0     predniSONE (DELTASONE) 5 MG tablet Take 1 tablet (5 mg) by mouth daily (Patient not taking: Reported on 8/27/2018) 30 tablet 3     tacrolimus (PROTOPIC) 0.1 % ointment Apply topically 2 times daily (Patient not taking: Reported on 10/8/2018) 60 g 3        Allergies   Allergen Reactions     Sulfadiazine      Patient declines this allergy. Other reaction(s): Hives/ Urticaria - Allergy     Tramadol      Patient declines this allergy. Other reaction(s): Shortness Of Breath/Wheezing/Chest Tightness - Allergy     Atorvastatin      Patient declines this allergy. Other reaction(s): Rash - Allergy     Bronopol Other (See Comments)     Previous Positive (+) skin patch test     Bacitracin Rash     Patch testing     Glutaraldehyde Rash     2+ patch testing     Gold Rash     Mild patch test reaction     Insulin Detemir      Patient declined this allergy. Other reaction(s): Headaches/ Migraines - Intolerance     Lisinopril       Patient declined this allergy. Other reaction(s): Headaches/ Migraines - Intolerance     Penicillins Rash     Sulfa Drugs Rash         Review of Systems:  -As per HPI  -Constitutional: The patient denies fatigue, fevers, chills, unintended weight loss, and night sweats.  -HEENT: Patient denies nonhealing oral sores.  -Skin: As above in HPI. No additional skin concerns.    Physical exam:  Vitals: There were no vitals taken for this visit.  GEN: This is a well developed, well-nourished female in no acute distress, in a pleasant mood.    SKIN: Focused examination of the palms and soles was performed.  -Hyperkeratosis of palms and soles of feet, some small fissuring, minimal erythema, tender to palpation near fissures, as expected.   --> overall minimally improved from last month.        Order for PATCH TESTS      [] Inpatient / [place]....   Bed ....  [x] Outpatient    Skin Atopy           :       [x] Yes   [] No  Rhinitis/Sinusitis :       [x] Yes   [] No  Allergic Asthma   :       [] Yes   [] No  Leg ulcers            :       [] Yes   [x] No  Hand eczema       :       [x] Yes   [] No    Leading hand :  [] R [] L               [] Ambidextrous                      Reason for tests (Suspected allergy): contact allergy on hands/feet or Psoriasis  Known previous allergies: ...    [] Standardized panels  [x] Standard panel (40 tests)  [x] Preservatives & Antimicrobials (31 tests)  [x] Emulsifiers & Additives (25 tests)   [] Perfumes/Flavours & Plants (25 tests)  [] Hairdresser panel (12 tests)  [] Rubber Chemicals (22 tests)  [] Plastics (26 tests)  [x] Colorants/Dyes/Food additives (20 tests)  [] Metals (implants/dental) (23 tests)  [] Local anaesthetics/NSAIDs (12 tests)  [] Antibiotics & Antimycotics (14 tests)   [x] Corticosteroids (15 tests)   [] Photopatch test (32 tests)   [] others: ...    [] Patient's own products: ...    DO NOT test if chemical or biological identity is unknown!     always ask from patient the  product information and safety sheets (MSDS)     [] Patient needs consultation with Allergy team  [x] Tests discussed with Allergy team (can have direct appointment for patch tests)    ==> put on back the patch tests on 07/23/2018 (added Colorants and dyes series --> patient thinks that dyes in money might play a role)  --> 1st readings on 07/25/2018: all tests were well sticking to the end and no skin reaction in any of the test fields. However, some itching over the corticosteroid series  --> 2nd readings on 07/27/2018: compositae +-++    RESULTS & EVALUATION of PATCH TESTS      Patch test readings after     [x] 2 days, [] 3 days [x] 4 days, [] 5 days,    [x] Allergic reaction diagnosed against: +-++ reaction to Compositae mix      Interpretation/ remarks:   Basically, all patch tests negatives. Some reaction to the compositae mix, but not sure how re levant this is. Patient cannot make a connection and does not recall that she has regular contact with plant extract.   However, patient has a generalised dry skin and this could indicate for an atopic dermatitis without relevant contact dermatitis. This might be an indication for treatment with Dupilumab.     [x] Patient information given   [x] SmartPractice information    [x] Treatment prescribed: maybe try again treatment with Dupilumab. PUVA treatment or radiotherapy would be a possibility as well. Appeal to insurance for Dupilumab treatment.   For the time being topical treatement: 10% OTC urea cream to palms and soles of feet and Vaseline and uses betamethasone ointment ==> use from Monday to Friday regularly every evening and maybe morning on hands Protopic 0.1% ointment (Tacrolimus)    Diagnosis:    ==> Chronic irritant hand and foot eczema probably based on atopic dermatitis2/2 toxic irritant vs allergic vs atopic vs psoriaform  --> unlikely fungal (no response to Itraconazole) -->  in culture Sagrahamala species isolated (contaminant?)   --> unlikely  contact dermatitis (patch tests without relevant sensibilisation)  --> psoriasis not excluded, but not likely    Patient had previously co-payment with Dupixent of 500$ per dose and this is not possible for patient.     - will have patient start methotrexate 15 mg weekly (patient may take 5 mg on morning, evening, morning)  - CBC w/diff and hepatic panel today    ==> Tazorac (Alitretinoin topically) and Dupilumab would be too expensive for the patient. Insurance doesn't have lots of co-payment. Would be more than $1'600. The same for Acitretine.      Follow up in 6 weeks to discuss and evaluate then liver function.    Staff Physician Comments:  I saw and evaluated the patient with the resident and I agree with the assessment and plan as documented in the resident's note.    Lester Aranda MD  Professor  Head of Dermato-Allergy Division  Department of Dermatology  [facility], [facility]      Bao Jackson MD, PhD  Medicine-Dermatology PGY-3

## 2018-11-05 NOTE — MR AVS SNAPSHOT
After Visit Summary   11/5/2018    Kasey Ibrahim    MRN: [id number]           Patient Information     Date Of Birth          1950        Visit Information        Provider Department      11/5/2018 8:30 AM Lester Aranda MD M [facility]        Today's Diagnoses     Encounter for long-term (current) use of high-risk medication    -  1    Chronic eczema of hand          Care Instructions    - Start taking methotrexate 15 mg weekly (6 tablets each week). You may take 2 tablets on a morning, evening and the following morning each week.             Follow-ups after your visit        Follow-up notes from your care team     Return in about 6 weeks (around 12/17/2018).      Your next 10 appointments already scheduled     Dec 10, 2018  7:45 AM CST   (Arrive by 7:30 AM)   Return Visit with MD CLARICE Delgado [facility] ([facility])    [address] [place] [id number] [phone]              Future tests that were ordered for you today     Open Future Orders        Priority Expected Expires Ordered    Hepatic panel Routine  11/5/2019 11/5/2018    CBC with platelets differential Routine  11/5/2019 11/5/2018            Who to contact     Please call your clinic at [phone] to:    Ask questions about your health    Make or cancel appointments    Discuss your medicines    Learn about your test results    Speak to your doctor            Additional Information About Your Visit        [email or web address] Information     [employer] gives you secure access to your electronic health record. If you see a primary care provider, you can also send messages to your care team and make appointments. If you have questions, please call your primary care clinic.  If you do not have a primary care provider, please call [phone] and they will assist you.      [employer] is an electronic gateway that provides easy, online access to your medical records. With  [employer], you can request a clinic appointment, read your test results, renew a prescription or communicate with your care team.     To access your existing account, please contact your [facility] or call [phone] for assistance.        Care EveryWhere ID     This is your Care EveryWhere ID. This could be used by other organizations to access your [place] medical records  [id number]         Blood Pressure from Last 3 Encounters:   07/23/18 126/79   07/24/17 114/63   07/03/17 115/59    Weight from Last 3 Encounters:   07/24/17 78.9 kg (174 lb)   07/03/17 77.1 kg (170 lb)   03/20/17 80.6 kg (177 lb 12.8 oz)                 Today's Medication Changes          These changes are accurate as of 11/5/18  9:05 AM.  If you have any questions, ask your nurse or doctor.               Start taking these medicines.        Dose/Directions    methotrexate sodium 2.5 MG Tabs   Used for:  Chronic eczema of hand   Started by:  Lester Aranda MD        Dose:  15 mg   Take 6 tablets (15 mg) by mouth once a week   Quantity:  24 tablet   Refills:  1            Where to get your medicines      These medications were sent to [facility] [address]  [address] [address]     Phone:  [phone]     methotrexate sodium 2.5 MG Tabs                Primary Care Provider Office Phone # Fax #    Ankit Dominguez Martini [phone]       [facility] [address] [facility] [address]        Equal Access to Services     LISA BALLARD : Gala mireleso Sokasandra, waaxda luqadaha, qaybta kaalmada rut sultana. So [facility] [phone].    ATENCIÓN: Si habla español, tiene a young disposición servicios gratuitos de asistencia lingüística. Charleneame al [phone].    We comply with applicable federal civil rights laws and [place] laws. We do not discriminate on the basis of race, color, national  origin, age, disability, sex, sexual orientation, or gender identity.            Thank you!     Thank you for choosing [facility]  for your care. Our goal is always to provide you with excellent care. Hearing back from our patients is one way we can continue to improve our services. Please take a few minutes to complete the written survey that you may receive in the mail after your visit with us. Thank you!             Your Updated Medication List - Protect others around you: Learn how to safely use, store and throw away your medicines at www.disposemymeds.org.          This list is accurate as of 11/5/18  9:05 AM.  Always use your most recent med list.                   Brand Name Dispense Instructions for use Diagnosis    acitretin 25 MG Caps capsule    SORIATANE    30 capsule    Take as directed    Chronic eczema of hand       Alitretinoin 0.1 % Gel     60 g    Externally apply topically 2 times daily    Other atopic dermatitis       augmented betamethasone dipropionate 0.05 % ointment    DIPROLENE-AF    50 g    Apply to affected areas of hand twice daily, everyday. Use Vaniply on top of this medication. Occlude with gloves at night.    Allergic contact dermatitis due to drugs in contact with skin       * betamethasone dipropionate 0.05 % ointment    DIPROSONE    50 g    Apply topically 2 times daily    Contact dermatitis due to other agent, unspecified contact dermatitis type       * betamethasone dipropionate 0.05 % ointment    DIPROSONE    50 g    Apply topically twice a week    Chronic eczema of hand       calcium carbonate 500 mg-vitamin D 200 units 500-200 MG-UNIT per tablet    OSCAL with D;OYSTER SHELL CALCIUM     Take 1 tablet by mouth daily        clobetasol 0.05 % ointment    TEMOVATE    60 g    Apply thin layer twice daily to hands and feet.  Soak hands before application at bedtime.    Allergic contact dermatitis, unspecified trigger, Chronic dermatitis of hands       * Dupilumab 300 MG/2ML  syringe    DUPIXENT    2 mL    Inject 2 mLs (300 mg) Subcutaneous every 14 days    Hand dermatitis, Foot dermatitis       * Dupilumab 300 MG/2ML syringe    DUPIXENT    4 mL    Inject 2 mLs (300 mg) Subcutaneous every 14 days    Other atopic dermatitis       FLUOXETINE HCL PO           methocarbamol 500 MG tablet    ROBAXIN     Take 500 mg by mouth        methotrexate sodium 2.5 MG Tabs     24 tablet    Take 6 tablets (15 mg) by mouth once a week    Chronic eczema of hand       methylPREDNISolone 4 MG tablet    MEDROL DOSEPAK    21 tablet    Follow package instructions    Chronic dermatitis of hands       naproxen 500 MG tablet    NAPROSYN     Take 500 mg by mouth        * predniSONE 10 MG tablet    DELTASONE    80 tablet    Take 40mg [id number] then [id number],, then 20mg qday until seen.    Chronic dermatitis of hands, Allergic contact dermatitis, unspecified trigger       * predniSONE 5 MG tablet    DELTASONE    30 tablet    Take 1 tablet (5 mg) by mouth daily    Allergic contact dermatitis, unspecified trigger       * predniSONE 1 MG tablet    DELTASONE    240 tablet    Take 8 tablets (8 mg) by mouth daily    Allergic contact dermatitis due to drugs in contact with skin       * predniSONE 10 MG tablet    DELTASONE    7 tablet    Take 1 tablet (10 mg) by mouth daily    Allergic contact dermatitis, unspecified trigger       * predniSONE 5 MG tablet    DELTASONE    72 tablet    8,8,7,7,6,6,5,5,4,4,3,3,2,2,1,1,    Allergic contact dermatitis, unspecified trigger       * tacrolimus 0.1 % ointment    PROTOPIC    60 g    Apply topically 2 times daily    Contact dermatitis due to other agent, unspecified contact dermatitis type       * tacrolimus 0.1 % ointment    PROTOPIC    60 g    Apply topically 2 times daily    Chronic eczema of hand       WELLBUTRIN PO           * Notice:  This list has 11 medication(s) that are the same as other medications prescribed for you. Read the directions carefully, and ask your doctor  or other care provider to review them with you.

## 2018-11-05 NOTE — NURSING NOTE
Dermatology Rooming Note    Kasey Ibrahim's goals for this visit include:   Chief Complaint   Patient presents with     Derm Problem     Kasey is here today for a eczema follow up- notes no improvment.      BRIDGER Rodas

## 2018-11-05 NOTE — PATIENT INSTRUCTIONS
- Start taking methotrexate 15 mg weekly (6 tablets each week). You may take 2 tablets on a morning, evening and the following morning each week.

## 2018-12-10 ENCOUNTER — OFFICE VISIT (OUTPATIENT)
Dept: DERMATOLOGY | Facility: CLINIC | Age: 68
End: 2018-12-10
Payer: COMMERCIAL

## 2018-12-10 DIAGNOSIS — L30.9 CHRONIC DERMATITIS: ICD-10-CM

## 2018-12-10 DIAGNOSIS — L30.9 CHRONIC ECZEMA OF HAND: Primary | ICD-10-CM

## 2018-12-10 DIAGNOSIS — L25.8 CONTACT DERMATITIS DUE TO OTHER AGENT, UNSPECIFIED CONTACT DERMATITIS TYPE: ICD-10-CM

## 2018-12-10 DIAGNOSIS — L23.3 ALLERGIC CONTACT DERMATITIS DUE TO DRUGS IN CONTACT WITH SKIN: ICD-10-CM

## 2018-12-10 DIAGNOSIS — Z79.899: ICD-10-CM

## 2018-12-10 LAB
ALBUMIN SERPL-MCNC: 3.7 G/DL (ref 3.4–5)
ALP SERPL-CCNC: 62 U/L (ref 40–150)
ALT SERPL W P-5'-P-CCNC: 21 U/L (ref 0–50)
AST SERPL W P-5'-P-CCNC: 16 U/L (ref 0–45)
BASOPHILS # BLD AUTO: 0 10E9/L (ref 0–0.2)
BASOPHILS NFR BLD AUTO: 0.6 %
BILIRUB DIRECT SERPL-MCNC: 0.1 MG/DL (ref 0–0.2)
BILIRUB SERPL-MCNC: 0.3 MG/DL (ref 0.2–1.3)
DIFFERENTIAL METHOD BLD: ABNORMAL
EOSINOPHIL # BLD AUTO: 0.1 10E9/L (ref 0–0.7)
EOSINOPHIL NFR BLD AUTO: 2.7 %
ERYTHROCYTE [DISTWIDTH] IN BLOOD BY AUTOMATED COUNT: 13.5 % (ref 10–15)
HCT VFR BLD AUTO: 40.4 % (ref 35–47)
HGB BLD-MCNC: 13.2 G/DL (ref 11.7–15.7)
IMM GRANULOCYTES # BLD: 0 10E9/L (ref 0–0.4)
IMM GRANULOCYTES NFR BLD: 0.2 %
LYMPHOCYTES # BLD AUTO: 0.7 10E9/L (ref 0.8–5.3)
LYMPHOCYTES NFR BLD AUTO: 12.8 %
MCH RBC QN AUTO: 31.4 PG (ref 26.5–33)
MCHC RBC AUTO-ENTMCNC: 32.7 G/DL (ref 31.5–36.5)
MCV RBC AUTO: 96 FL (ref 78–100)
MONOCYTES # BLD AUTO: 0.6 10E9/L (ref 0–1.3)
MONOCYTES NFR BLD AUTO: 11.4 %
NEUTROPHILS # BLD AUTO: 3.8 10E9/L (ref 1.6–8.3)
NEUTROPHILS NFR BLD AUTO: 72.3 %
NRBC # BLD AUTO: 0 10*3/UL
NRBC BLD AUTO-RTO: 0 /100
PLATELET # BLD AUTO: 217 10E9/L (ref 150–450)
PROT SERPL-MCNC: 7.3 G/DL (ref 6.8–8.8)
RBC # BLD AUTO: 4.21 10E12/L (ref 3.8–5.2)
WBC # BLD AUTO: 5.3 10E9/L (ref 4–11)

## 2018-12-10 RX ORDER — UREA 200 MG/G
CREAM TOPICAL
Qty: 480 G | Refills: 3 | Status: SHIPPED | OUTPATIENT
Start: 2018-12-10

## 2018-12-10 RX ORDER — METHOTREXATE 2.5 MG/1
15 TABLET ORAL WEEKLY
Qty: 24 TABLET | Refills: 2 | Status: SHIPPED | OUTPATIENT
Start: 2018-12-10 | End: 2019-12-17

## 2018-12-10 RX ORDER — TACROLIMUS 1 MG/G
OINTMENT TOPICAL DAILY
Qty: 60 G | Refills: 3 | Status: SHIPPED | OUTPATIENT
Start: 2018-12-10

## 2018-12-10 RX ORDER — BETAMETHASONE DIPROPIONATE 0.05 %
OINTMENT (GRAM) TOPICAL
Qty: 50 G | Refills: 3 | Status: SHIPPED | OUTPATIENT
Start: 2018-12-10 | End: 2019-07-22

## 2018-12-10 ASSESSMENT — PAIN SCALES - GENERAL: PAINLEVEL: NO PAIN (0)

## 2018-12-10 NOTE — NURSING NOTE
Dermatology Rooming Note    Kasey Ibrahim's goals for this visit include:   Chief Complaint   Patient presents with     Derm Problem     Follow up on eczema.     Ruby Munoz, KYLE Munoz CMA

## 2018-12-10 NOTE — PATIENT INSTRUCTIONS
- Continue taking methotrexate. Take one morning, one evening, and the next morning once a week. Should be taking 15mg total/week.  - Use the Urea cream 20% on your feet  - Labs today    Follow up in 2.5 months

## 2018-12-10 NOTE — PROGRESS NOTES
[facility] Dermatology Note      Dermatology Problem List:  1.Chronic hand and foot eczema 2/2 toxic irritant vs allergic vs atopic psoriaform  - On Methotrexate 15mg weekly. (Takes 5mg for a consecutive morning, evening, morning.)  - Using betamethasone 0.05% ointment on Saturday and Sunday  - Using tacrolimus 0/1% ointment Monday through Friday  - Using 20% Urea cream on feet    Encounter Date: Dec 10, 2018    CC:   Chief Complaint   Patient presents with     Derm Problem     Follow up on eczema.         History of Present Illness:  Ms. Kasey Ibrahim is a 68 year old female who presents with chronic hand and foot eczema for past 4.5 years.  She has tried and failed: nb uvb, MMF, acitretin, methotrexate (all prior to presenting at the ), minocycline ( 8/2016-3/20/17), prednisone, clobetasol 0.05% ointment, CSA 3mg/kg/day, and single dose of 600mg dupilumab (unable to continue dupilumab due to high copayment).  She had previous allergy patch test in 2013 where she was only found to have +1 to gold and bacitracin, in other words, negative patch test. ([facility]  Repeat patch testing 7/23/18 showed possible positive to Compositae, but was otherwise negative. No personal or family hx of psoriasis or hay fever.  She uses T gel for posterior scalp itching, which helps.     Interval Hx:  Patient reports that she thinks her hands have improved since starting the Methotrexate. She hasn't noticed a big difference in her feet. She has been taking methotrexate as directed with a consecutive morning, evening, and morning dose once per week. She has also been using betamethasone on Saturday and Sunday, and tacrolimus cream M-F. She uses a urea 20% cream on her feet and also frequently moisturizes with a lotion containing Vaseline.  Her last hepatic labs on 11/05/18 were normal.     She is also concerned because her brother has Raynaud's and scleroderma. He recently had a finger  amputation. She has never had any symptoms of decreased blood flow to her hands or color changes. She hasn't noticed any tightening of her skin.    Past Medical History:   Patient Active Problem List   Diagnosis     Cholangitis     Past Medical History:   Diagnosis Date     Depression      Hand dermatitis      Past Surgical History:   Procedure Laterality Date     FOOT SURGERY       PHACOEMULSIFICATION WITH STANDARD INTRAOCULAR LENS IMPLANT Left 7/3/2017    Procedure: PHACOEMULSIFICATION WITH STANDARD INTRAOCULAR LENS IMPLANT;  Left cataract removal with implant;  Surgeon: Dominguez Payne MD;  Location: [facility] OR     PHACOEMULSIFICATION WITH STANDARD INTRAOCULAR LENS IMPLANT Right 7/24/2017    Procedure: PHACOEMULSIFICATION WITH STANDARD INTRAOCULAR LENS IMPLANT;  Right cataract removal with implant;  Surgeon: Dominguez Payne MD;  Location: [facility] OR       Social History:  The patient works for [employer] as a .     Family History:  No family hx of atopic dermatitis, hay fever, psoriasis  Brother- Scleroderm, Raynaud's.     Medications:  Current Outpatient Medications   Medication Sig Dispense Refill     betamethasone dipropionate (DIPROSONE) 0.05 % ointment Apply topically twice a week 50 g 3     BuPROPion HCl (WELLBUTRIN PO)        FLUOXETINE HCL PO        acitretin (SORIATANE) 25 MG CAPS capsule Take as directed (Patient not taking: Reported on 12/10/2018) 30 capsule 1     Alitretinoin 0.1 % GEL Externally apply topically 2 times daily (Patient not taking: Reported on 12/10/2018) 60 g 3     augmented betamethasone dipropionate (DIPROLENE-AF) 0.05 % ointment Apply to affected areas of hand twice daily, everyday. Use Vaniply on top of this medication. Occlude with gloves at night. (Patient not taking: Reported on 8/27/2018) 50 g 3     betamethasone dipropionate (DIPROSONE) 0.05 % ointment Apply topically 2 times daily (Patient not taking: Reported on 12/10/2018) 50 g 3     Calcium carb-Vitamin D 500  mg [place]-200 units (OSCAL WITH D;OYSTER SHELL CALCIUM) 500-200 MG-UNIT per tablet Take 1 tablet by mouth daily       clobetasol (TEMOVATE) 0.05 % ointment Apply thin layer twice daily to hands and feet.  Soak hands before application at bedtime. (Patient not taking: Reported on 8/27/2018) 60 g 11     Dupilumab (DUPIXENT) 300 MG/2ML syringe Inject 2 mLs (300 mg) Subcutaneous every 14 days (Patient not taking: Reported on 12/10/2018) 4 mL 5     Dupilumab (DUPIXENT) 300 MG/2ML syringe Inject 2 mLs (300 mg) Subcutaneous every 14 days (Patient not taking: Reported on 8/27/2018) 2 mL 4     methocarbamol (ROBAXIN) 500 MG tablet Take 500 mg by mouth       methotrexate sodium 2.5 MG TABS Take 6 tablets (15 mg) by mouth once a week (Patient not taking: Reported on 12/10/2018) 24 tablet 1     methylPREDNISolone (MEDROL DOSEPAK) 4 MG tablet Follow package instructions (Patient not taking: Reported on 8/27/2018) 21 tablet 0     naproxen (NAPROSYN) 500 MG tablet Take 500 mg by mouth       predniSONE (DELTASONE) 1 MG tablet Take 8 tablets (8 mg) by mouth daily (Patient not taking: Reported on 8/27/2018) 240 tablet 3     predniSONE (DELTASONE) 10 MG tablet Take 1 tablet (10 mg) by mouth daily (Patient not taking: Reported on 8/27/2018) 7 tablet 0     predniSONE (DELTASONE) 10 MG tablet Take 40mg [id number] then [id number],, then 20mg qday until seen. (Patient not taking: Reported on 8/27/2018) 80 tablet 0     predniSONE (DELTASONE) 5 MG tablet 8,8,7,7,6,6,5,5,4,4,3,3,2,2,1,1, (Patient not taking: Reported on 8/27/2018) 72 tablet 0     predniSONE (DELTASONE) 5 MG tablet Take 1 tablet (5 mg) by mouth daily (Patient not taking: Reported on 8/27/2018) 30 tablet 3     tacrolimus (PROTOPIC) 0.1 % ointment Apply topically 2 times daily (Patient not taking: Reported on 12/10/2018) 60 g 3     tacrolimus (PROTOPIC) 0.1 % ointment Apply topically 2 times daily (Patient not taking: Reported on 10/8/2018) 60 g 3        Allergies   Allergen  Reactions     Sulfadiazine      Patient declines this allergy. Other reaction(s): Hives/ Urticaria - Allergy     Tramadol      Patient declines this allergy. Other reaction(s): Shortness Of Breath/Wheezing/Chest Tightness - Allergy     Atorvastatin      Patient declines this allergy. Other reaction(s): Rash - Allergy     Bronopol Other (See Comments)     Previous Positive (+) skin patch test     Bacitracin Rash     Patch testing     Glutaraldehyde Rash     2+ patch testing     Gold Rash     Mild patch test reaction     Insulin Detemir      Patient declined this allergy. Other reaction(s): Headaches/ Migraines - Intolerance     Lisinopril      Patient declined this allergy. Other reaction(s): Headaches/ Migraines - Intolerance     Penicillins Rash     Sulfa Drugs Rash         Review of Systems:  -As per HPI  -Constitutional: The patient is otherwise in her normal state of health.   -Skin: As above in HPI. No additional skin concerns.    Physical exam:  Vitals: There were no vitals taken for this visit.  GEN: This is a well developed, well-nourished female in no acute distress, in a pleasant mood.    SKIN: Focused examination of the palms and soles was performed.  -Hyperkeratosis of soles of feet. Minimal erythema. No large fissures  -Hands appear smooth with some mildly erythematous thin plaques on palms. No bleeding or fissuring.  -Overall significantly improved since last visit.         Order for PATCH TESTS      [] Inpatient / [place]....   Bed ....  [x] Outpatient    Skin Atopy           :       [x] Yes   [] No  Rhinitis/Sinusitis :       [x] Yes   [] No  Allergic Asthma   :       [] Yes   [] No  Leg ulcers            :       [] Yes   [x] No  Hand eczema       :       [x] Yes   [] No    Leading hand :  [] R [] L               [] Ambidextrous                      Reason for tests (Suspected allergy): contact allergy on hands/feet or Psoriasis  Known previous allergies: ...    [] Standardized panels  [x] Standard  panel (40 tests)  [x] Preservatives & Antimicrobials (31 tests)  [x] Emulsifiers & Additives (25 tests)   [] Perfumes/Flavours & Plants (25 tests)  [] Hairdresser panel (12 tests)  [] Rubber Chemicals (22 tests)  [] Plastics (26 tests)  [x] Colorants/Dyes/Food additives (20 tests)  [] Metals (implants/dental) (23 tests)  [] Local anaesthetics/NSAIDs (12 tests)  [] Antibiotics & Antimycotics (14 tests)   [x] Corticosteroids (15 tests)   [] Photopatch test (32 tests)   [] others: ...    [] Patient's own products: ...    DO NOT test if chemical or biological identity is unknown!     always ask from patient the product information and safety sheets (MSDS)     [] Patient needs consultation with Allergy team  [x] Tests discussed with Allergy team (can have direct appointment for patch tests)    ==> put on back the patch tests on 07/23/2018 (added Colorants and dyes series --> patient thinks that dyes in money might play a role)  --> 1st readings on 07/25/2018: all tests were well sticking to the end and no skin reaction in any of the test fields. However, some itching over the corticosteroid series  --> 2nd readings on 07/27/2018: compositae +-++    RESULTS & EVALUATION of PATCH TESTS      Patch test readings after     [x] 2 days, [] 3 days [x] 4 days, [] 5 days,    [x] Allergic reaction diagnosed against: +-++ reaction to Compositae mix      Interpretation/ remarks:   Basically, all patch tests negatives. Some reaction to the compositae mix, but not sure how re levant this is. Patient cannot make a connection and does not recall that she has regular contact with plant extract.   However, patient has a generalised dry skin and this could indicate for an atopic dermatitis without relevant contact dermatitis. This might be an indication for treatment with Dupilumab.     [x] Patient information given   [x] SmartPractice information    [x] Treatment prescribed: maybe try again treatment with Dupilumab. PUVA treatment or  radiotherapy would be a possibility as well. Appeal to insurance for Dupilumab treatment.   For the time being topical treatement: 10% OTC urea cream to palms and soles of feet and Vaseline and uses betamethasone ointment ==> use from Monday to Friday regularly every evening and maybe morning on hands Protopic 0.1% ointment (Tacrolimus)    Diagnosis:    1) Chronic irritant hand and foot eczema probably based on atopic dermatitis 2/2 toxic irritant vs allergic vs atopic vs psoriaform  - Unlikely fungal (no response to Itraconazole) -->  in culture Sagrahamala species isolated (contaminant?)   - Unlikely contact dermatitis (patch tests without relevant sensibilisation)  - Psoriasis not excluded, but not likely  - Previously had $500 dose copayment for Dupixent. Not possible for her to continue  -Tazorac (Alitretinoin topically) and Dupilumab would be too expensive for the patient. Insurance doesn't have lots of co-payment. Would be more than $1'600. The same for Acitretine.  - Taking Methotrexate 15mg weekly. (Take 5mg on a consecutive morning, evening, and morning.)  - Repeat CBC w/diff and hepatic panel today   - Using betamethasone Saturday and Sunday. Using Tacrolimus M-F  - Using Urea cream 20%  - Uses lotion with vaseline multiple times a day  - 3 month refills provided at today's visit (12/10/18)      Follow up in 2.5 months.    I, Latonia Guadalupe (MS4), saw and examined the patient in the presence of Dr. Aranda.   Staff Physician Comments:  I was present with the medical student who participated in the service and in the documentation of the note. I have verified the history and personally performed the physical exam and medical decision making. I agree with the assessment and plan as documented in the note. I have reviewed and if necessary amended the note.      Lester Aranda MD  Professor  Head of Dermato-Allergy Division  Department of Dermatology  [facility]  I spent  a total of 15 min face to face with Kasey Ibrahim during today's office visit. About 50% of the time was spent counseling the patient and/or coordinating care regarding their allergy.

## 2018-12-10 NOTE — LETTER
12/10/2018       RE: Kasey Ibrahim  [address] [facility], [place], operated by [facility] [id number]     Dear Colleague,    Thank you for referring your patient, Kasey Ibrahim, to the [facility] DERMATOLOGY at [facility]. Please see a copy of my visit note below.    [facility] Dermatology Note      Dermatology Problem List:  1.Chronic hand and foot eczema 2/2 toxic irritant vs allergic vs atopic psoriaform  - On Methotrexate 15mg weekly. (Takes 5mg for a consecutive morning, evening, morning.)  - Using betamethasone 0.05% ointment on Saturday and Sunday  - Using tacrolimus 0/1% ointment Monday through Friday  - Using 20% Urea cream on feet    Encounter Date: Dec 10, 2018    CC:   Chief Complaint   Patient presents with     Derm Problem     Follow up on eczema.         History of Present Illness:  Ms. Kasey Ibrahim is a 68 year old female who presents with chronic hand and foot eczema for past 4.5 years.  She has tried and failed: nb uvb, MMF, acitretin, methotrexate (all prior to presenting at the ), minocycline ( 8/2016-3/20/17), prednisone, clobetasol 0.05% ointment, CSA 3mg/kg/day, and single dose of 600mg dupilumab (unable to continue dupilumab due to high copayment).  She had previous allergy patch test in 2013 where she was only found to have +1 to gold and bacitracin, in other words, negative patch test. (See [facility] records from [facility]).  Repeat patch testing 7/23/18 showed possible positive to Compositae, but was otherwise negative. No personal or family hx of psoriasis or hay fever.  She uses T gel for posterior scalp itching, which helps.     Interval Hx:  Patient reports that she thinks her hands have improved since starting the Methotrexate. She hasn't noticed a big difference in her feet. She has been taking methotrexate as directed with a consecutive morning, evening, and morning dose once per week. She has also been using betamethasone on Saturday and  Sunday, and tacrolimus cream M-F. She uses a urea 20% cream on her feet and also frequently moisturizes with a lotion containing Vaseline.  Her last hepatic labs on 11/05/18 were normal.     She is also concerned because her brother has Raynaud's and scleroderma. He recently had a finger amputation. She has never had any symptoms of decreased blood flow to her hands or color changes. She hasn't noticed any tightening of her skin.    Past Medical History:   Patient Active Problem List   Diagnosis     Cholangitis     Past Medical History:   Diagnosis Date     Depression      Hand dermatitis      Past Surgical History:   Procedure Laterality Date     FOOT SURGERY       PHACOEMULSIFICATION WITH STANDARD INTRAOCULAR LENS IMPLANT Left 7/3/2017    Procedure: PHACOEMULSIFICATION WITH STANDARD INTRAOCULAR LENS IMPLANT;  Left cataract removal with implant;  Surgeon: Dominguez Payne MD;  Location: [facility] OR     PHACOEMULSIFICATION WITH STANDARD INTRAOCULAR LENS IMPLANT Right 7/24/2017    Procedure: PHACOEMULSIFICATION WITH STANDARD INTRAOCULAR LENS IMPLANT;  Right cataract removal with implant;  Surgeon: Dominguez Payne MD;  Location: [facility] OR       Social History:  The patient works for [employer] as a .     Family History:  No family hx of atopic dermatitis, hay fever, psoriasis  Brother- Scleroderm, Raynaud's.     Medications:  Current Outpatient Medications   Medication Sig Dispense Refill     betamethasone dipropionate (DIPROSONE) 0.05 % ointment Apply topically twice a week 50 g 3     BuPROPion HCl (WELLBUTRIN PO)        FLUOXETINE HCL PO        acitretin (SORIATANE) 25 MG CAPS capsule Take as directed (Patient not taking: Reported on 12/10/2018) 30 capsule 1     Alitretinoin 0.1 % GEL Externally apply topically 2 times daily (Patient not taking: Reported on 12/10/2018) 60 g 3     augmented betamethasone dipropionate (DIPROLENE-AF) 0.05 % ointment Apply to affected areas of hand twice daily, everyday. Use  Vaniply on top of this medication. Occlude with gloves at night. (Patient not taking: Reported on 8/27/2018) 50 g 3     betamethasone dipropionate (DIPROSONE) 0.05 % ointment Apply topically 2 times daily (Patient not taking: Reported on 12/10/2018) 50 g 3     Calcium carb-Vitamin D 500 mg [place]-200 units (OSCAL WITH D;OYSTER SHELL CALCIUM) 500-200 MG-UNIT per tablet Take 1 tablet by mouth daily       clobetasol (TEMOVATE) 0.05 % ointment Apply thin layer twice daily to hands and feet.  Soak hands before application at bedtime. (Patient not taking: Reported on 8/27/2018) 60 g 11     Dupilumab (DUPIXENT) 300 MG/2ML syringe Inject 2 mLs (300 mg) Subcutaneous every 14 days (Patient not taking: Reported on 12/10/2018) 4 mL 5     Dupilumab (DUPIXENT) 300 MG/2ML syringe Inject 2 mLs (300 mg) Subcutaneous every 14 days (Patient not taking: Reported on 8/27/2018) 2 mL 4     methocarbamol (ROBAXIN) 500 MG tablet Take 500 mg by mouth       methotrexate sodium 2.5 MG TABS Take 6 tablets (15 mg) by mouth once a week (Patient not taking: Reported on 12/10/2018) 24 tablet 1     methylPREDNISolone (MEDROL DOSEPAK) 4 MG tablet Follow package instructions (Patient not taking: Reported on 8/27/2018) 21 tablet 0     naproxen (NAPROSYN) 500 MG tablet Take 500 mg by mouth       predniSONE (DELTASONE) 1 MG tablet Take 8 tablets (8 mg) by mouth daily (Patient not taking: Reported on 8/27/2018) 240 tablet 3     predniSONE (DELTASONE) 10 MG tablet Take 1 tablet (10 mg) by mouth daily (Patient not taking: Reported on 8/27/2018) 7 tablet 0     predniSONE (DELTASONE) 10 MG tablet Take 40mg [id number] then [id number],, then 20mg qday until seen. (Patient not taking: Reported on 8/27/2018) 80 tablet 0     predniSONE (DELTASONE) 5 MG tablet 8,8,7,7,6,6,5,5,4,4,3,3,2,2,1,1, (Patient not taking: Reported on 8/27/2018) 72 tablet 0     predniSONE (DELTASONE) 5 MG tablet Take 1 tablet (5 mg) by mouth daily (Patient not taking: Reported on  8/27/2018) 30 tablet 3     tacrolimus (PROTOPIC) 0.1 % ointment Apply topically 2 times daily (Patient not taking: Reported on 12/10/2018) 60 g 3     tacrolimus (PROTOPIC) 0.1 % ointment Apply topically 2 times daily (Patient not taking: Reported on 10/8/2018) 60 g 3        Allergies   Allergen Reactions     Sulfadiazine      Patient declines this allergy. Other reaction(s): Hives/ Urticaria - Allergy     Tramadol      Patient declines this allergy. Other reaction(s): Shortness Of Breath/Wheezing/Chest Tightness - Allergy     Atorvastatin      Patient declines this allergy. Other reaction(s): Rash - Allergy     Bronopol Other (See Comments)     Previous Positive (+) skin patch test     Bacitracin Rash     Patch testing     Glutaraldehyde Rash     2+ patch testing     Gold Rash     Mild patch test reaction     Insulin Detemir      Patient declined this allergy. Other reaction(s): Headaches/ Migraines - Intolerance     Lisinopril      Patient declined this allergy. Other reaction(s): Headaches/ Migraines - Intolerance     Penicillins Rash     Sulfa Drugs Rash         Review of Systems:  -As per HPI  -Constitutional: The patient is otherwise in her normal state of health.   -Skin: As above in HPI. No additional skin concerns.    Physical exam:  Vitals: There were no vitals taken for this visit.  GEN: This is a well developed, well-nourished female in no acute distress, in a pleasant mood.    SKIN: Focused examination of the palms and soles was performed.  -Hyperkeratosis of soles of feet. Minimal erythema. No large fissures  -Hands appear smooth with some mildly erythematous thin plaques on palms. No bleeding or fissuring.  -Overall significantly improved since last visit.         Order for PATCH TESTS      [] Inpatient / [place]....   Bed ....  [x] Outpatient    Skin Atopy           :       [x] Yes   [] No  Rhinitis/Sinusitis :       [x] Yes   [] No  Allergic Asthma   :       [] Yes   [] No  Leg ulcers            :        [] Yes   [x] No  Hand eczema       :       [x] Yes   [] No    Leading hand :  [] R [] L               [] Ambidextrous                      Reason for tests (Suspected allergy): contact allergy on hands/feet or Psoriasis  Known previous allergies: ...    [] Standardized panels  [x] Standard panel (40 tests)  [x] Preservatives & Antimicrobials (31 tests)  [x] Emulsifiers & Additives (25 tests)   [] Perfumes/Flavours & Plants (25 tests)  [] Hairdresser panel (12 tests)  [] Rubber Chemicals (22 tests)  [] Plastics (26 tests)  [x] Colorants/Dyes/Food additives (20 tests)  [] Metals (implants/dental) (23 tests)  [] Local anaesthetics/NSAIDs (12 tests)  [] Antibiotics & Antimycotics (14 tests)   [x] Corticosteroids (15 tests)   [] Photopatch test (32 tests)   [] others: ...    [] Patient's own products: ...    DO NOT test if chemical or biological identity is unknown!     always ask from patient the product information and safety sheets (MSDS)     [] Patient needs consultation with Allergy team  [x] Tests discussed with Allergy team (can have direct appointment for patch tests)    ==> put on back the patch tests on 07/23/2018 (added Colorants and dyes series --> patient thinks that dyes in money might play a role)  --> 1st readings on 07/25/2018: all tests were well sticking to the end and no skin reaction in any of the test fields. However, some itching over the corticosteroid series  --> 2nd readings on 07/27/2018: compositae +-++    RESULTS & EVALUATION of PATCH TESTS      Patch test readings after     [x] 2 days, [] 3 days [x] 4 days, [] 5 days,    [x] Allergic reaction diagnosed against: +-++ reaction to Compositae mix      Interpretation/ remarks:   Basically, all patch tests negatives. Some reaction to the compositae mix, but not sure how re levant this is. Patient cannot make a connection and does not recall that she has regular contact with plant extract.   However, patient has a generalised dry skin and this  could indicate for an atopic dermatitis without relevant contact dermatitis. This might be an indication for treatment with Dupilumab.     [x] Patient information given   [x] [employer] information    [x] Treatment prescribed: maybe try again treatment with Dupilumab. PUVA treatment or radiotherapy would be a possibility as well. Appeal to insurance for Dupilumab treatment.   For the time being topical treatement: 10% OTC urea cream to palms and soles of feet and Vaseline and uses betamethasone ointment ==> use from Monday to Friday regularly every evening and maybe morning on hands Protopic 0.1% ointment (Tacrolimus)    Diagnosis:    1) Chronic irritant hand and foot eczema probably based on atopic dermatitis 2/2 toxic irritant vs allergic vs atopic vs psoriaform  - Unlikely fungal (no response to Itraconazole) -->  in culture [place] species isolated (contaminant?)   - Unlikely contact dermatitis (patch tests without relevant sensibilisation)  - Psoriasis not excluded, but not likely  - Previously had $500 dose copayment for Dupixent. Not possible for her to continue  -Tazorac (Alitretinoin topically) and Dupilumab would be too expensive for the patient. Insurance doesn't have lots of co-payment. Would be more than $1'600. The same for Acitretine.  - Taking Methotrexate 15mg weekly. (Take 5mg on a consecutive morning, evening, and morning.)  - Repeat CBC w/diff and hepatic panel today   - Using betamethasone Saturday and Sunday. Using Tacrolimus M-F  - Using Urea cream 20%  - Uses lotion with vaseline multiple times a day  - 3 month refills provided at today's visit (12/10/18)      Follow up in 2.5 months.    I, Latonia Guadalupe ([email or web address]), saw and examined the patient in the presence of Dr. Aranda.   Staff Physician Comments:  I was present with the medical student who participated in the service and in the documentation of the note. I have verified the history and personally performed the physical exam and  medical decision making. I agree with the assessment and plan as documented in the note. I have reviewed and if necessary amended the note.        I spent a total of 15 min face to face with Kasey Ibrahim during today's office visit. About 50% of the time was spent counseling the patient and/or coordinating care regarding their allergy.      Lester Aranda MD

## 2019-02-06 ENCOUNTER — DOCUMENTATION ONLY (OUTPATIENT)
Dept: CARE COORDINATION | Facility: CLINIC | Age: 69
End: 2019-02-06

## 2019-02-11 ENCOUNTER — OFFICE VISIT (OUTPATIENT)
Dept: DERMATOLOGY | Facility: CLINIC | Age: 69
End: 2019-02-11
Payer: MEDICARE

## 2019-02-11 DIAGNOSIS — L30.9 CHRONIC ECZEMA OF HAND: Primary | ICD-10-CM

## 2019-02-11 DIAGNOSIS — Z51.81 MEDICATION MONITORING ENCOUNTER: ICD-10-CM

## 2019-02-11 LAB
ALBUMIN SERPL-MCNC: 3.6 G/DL (ref 3.4–5)
ALP SERPL-CCNC: 73 U/L (ref 40–150)
ALT SERPL W P-5'-P-CCNC: 20 U/L (ref 0–50)
AST SERPL W P-5'-P-CCNC: 17 U/L (ref 0–45)
BASOPHILS # BLD AUTO: 0 10E9/L (ref 0–0.2)
BASOPHILS NFR BLD AUTO: 0.7 %
BILIRUB DIRECT SERPL-MCNC: 0.2 MG/DL (ref 0–0.2)
BILIRUB SERPL-MCNC: 0.4 MG/DL (ref 0.2–1.3)
DIFFERENTIAL METHOD BLD: ABNORMAL
EOSINOPHIL # BLD AUTO: 0.2 10E9/L (ref 0–0.7)
EOSINOPHIL NFR BLD AUTO: 3.4 %
ERYTHROCYTE [DISTWIDTH] IN BLOOD BY AUTOMATED COUNT: 14.4 % (ref 10–15)
HCT VFR BLD AUTO: 42 % (ref 35–47)
HGB BLD-MCNC: 13.3 G/DL (ref 11.7–15.7)
IMM GRANULOCYTES # BLD: 0 10E9/L (ref 0–0.4)
IMM GRANULOCYTES NFR BLD: 0.7 %
LYMPHOCYTES # BLD AUTO: 0.7 10E9/L (ref 0.8–5.3)
LYMPHOCYTES NFR BLD AUTO: 16 %
MCH RBC QN AUTO: 31 PG (ref 26.5–33)
MCHC RBC AUTO-ENTMCNC: 31.7 G/DL (ref 31.5–36.5)
MCV RBC AUTO: 98 FL (ref 78–100)
MONOCYTES # BLD AUTO: 0.6 10E9/L (ref 0–1.3)
MONOCYTES NFR BLD AUTO: 14.4 %
NEUTROPHILS # BLD AUTO: 2.9 10E9/L (ref 1.6–8.3)
NEUTROPHILS NFR BLD AUTO: 64.8 %
NRBC # BLD AUTO: 0 10*3/UL
NRBC BLD AUTO-RTO: 0 /100
PLATELET # BLD AUTO: 262 10E9/L (ref 150–450)
PROT SERPL-MCNC: 7.4 G/DL (ref 6.8–8.8)
RBC # BLD AUTO: 4.29 10E12/L (ref 3.8–5.2)
WBC # BLD AUTO: 4.4 10E9/L (ref 4–11)

## 2019-02-11 RX ORDER — FOLIC ACID 1 MG/1
1 TABLET ORAL DAILY
Qty: 90 TABLET | Refills: 3 | Status: SHIPPED | OUTPATIENT
Start: 2019-02-11 | End: 2019-04-22

## 2019-02-11 RX ORDER — METHOTREXATE 2.5 MG/1
15 TABLET ORAL WEEKLY
Qty: 24 TABLET | Refills: 2 | Status: SHIPPED | OUTPATIENT
Start: 2019-02-11 | End: 2019-04-22

## 2019-02-11 ASSESSMENT — PAIN SCALES - GENERAL: PAINLEVEL: NO PAIN (0)

## 2019-02-11 NOTE — NURSING NOTE
Dermatology Rooming Note    Kasey Ibrahim's goals for this visit include:   Chief Complaint   Patient presents with     Eczema     Kasey is here for follow up.     Ruby Munoz, CMA

## 2019-02-11 NOTE — PROGRESS NOTES
[facility] Dermatology Note      Dermatology Problem List:  1.Chronic hand and foot eczema 2/2 toxic irritant vs allergic vs atopic psoriaform  - On Methotrexate 15mg weekly. (Takes 5mg for a consecutive morning, evening, morning.)   - decrease by 2.5mg per month as tolerated, Folic acid 1mg other 5 days of week  - Betamethasone 0.05% ointment BID on Saturday and Sunday  - Tacrolimus 0.1% ointment Monday through Friday  -  20% Urea cream on feet    Encounter Date: Feb 11, 2019    CC:   Chief Complaint   Patient presents with     Eczema     Kasey is here for follow up.         History of Present Illness:  Ms. Kasey Ibrahim is a 68 year old female who presents with chronic hand and foot eczema for past 4.5 years.  She has tried and failed: nb uvb, MMF, acitretin, methotrexate (all prior to presenting at the ), minocycline ( 8/2016-3/20/17), prednisone, clobetasol 0.05% ointment, CSA 3mg/kg/day, and single dose of 600mg dupilumab (unable to continue dupilumab due to high copayment).  She had previous allergy patch test in 2013 where she was only found to have +1 to gold and bacitracin, in other words, negative patch test. (See [facility] records from [facility]).  Repeat patch testing 7/23/18 showed possible positive to Compositae, but was otherwise negative. No personal or family hx of psoriasis or hay fever.  She uses T gel for posterior scalp itching, which helps.     Interval Hx:  Since her last visit Ms. Ibrahim states that her hand and foot dermatitis has greatly improved thinks to methotrexate.  She has been taking methotrexate as prescribed 5 mg on Tuesday morning, Tuesday evening, and Wednesday morning for total dosing of 15 mg.  Folic acid was not prescribed in the past and so has not been taking this.  As far as her topicals are concerned, she has been using betamethasone ointment to the active areas on her hands and feet on a daily basis on the weekends with use of tacrolimus ointment  once daily on weekdays.  She reports using urea cream on her feet primarily about twice a day and other lotions on the hands after handwashing approximately 3 times per day.  She is very happy with her progress and does not have any major concerns at today's visit.  She reports this is the best her hands of left in about 5 years time.  She denies any other changes in her health since her last visit.  Her last labs in December including hepatic panel and CBC were unremarkable.    Past Medical History:   Patient Active Problem List   Diagnosis     Cholangitis     Past Medical History:   Diagnosis Date     Depression      Hand dermatitis      Past Surgical History:   Procedure Laterality Date     FOOT SURGERY       PHACOEMULSIFICATION WITH STANDARD INTRAOCULAR LENS IMPLANT Left 7/3/2017    Procedure: PHACOEMULSIFICATION WITH STANDARD INTRAOCULAR LENS IMPLANT;  Left cataract removal with implant;  Surgeon: Dominguez Payne MD;  Location: [facility] OR     PHACOEMULSIFICATION WITH STANDARD INTRAOCULAR LENS IMPLANT Right 7/24/2017    Procedure: PHACOEMULSIFICATION WITH STANDARD INTRAOCULAR LENS IMPLANT;  Right cataract removal with implant;  Surgeon: Dominguez Payne MD;  Location: [facility] OR       Social History:  The patient works for [employer] as a .     Family History:  No family hx of atopic dermatitis, hay fever, psoriasis  Brother- Scleroderm, Raynaud's.     Medications:  Current Outpatient Medications   Medication Sig Dispense Refill     betamethasone dipropionate (DIPROSONE) 0.05 % external ointment Use on Saturday and Sunday. 50 g 3     betamethasone dipropionate (DIPROSONE) 0.05 % ointment Apply topically twice a week 50 g 3     BuPROPion HCl (WELLBUTRIN PO)        Calcium carb-Vitamin D 500 mg [place]-200 units (OSCAL WITH D;OYSTER SHELL CALCIUM) 500-200 MG-UNIT per tablet Take 1 tablet by mouth daily       clobetasol (TEMOVATE) 0.05 % ointment Apply thin layer twice daily to hands and feet.  Soak hands  before application at bedtime. 60 g 11     Dupilumab (DUPIXENT) 300 MG/2ML syringe Inject 2 mLs (300 mg) Subcutaneous every 14 days 4 mL 5     FLUOXETINE HCL PO        methocarbamol (ROBAXIN) 500 MG tablet Take 500 mg by mouth       methotrexate sodium 2.5 MG TABS Take 6 tablets (15 mg) by mouth once a week Take 2 tablets one morning,  2 tablets that evening. Then 2 tablets the following morning. 24 tablet 2     naproxen (NAPROSYN) 500 MG tablet Take 500 mg by mouth       urea (GORMEL) 20 % external cream Apply daily to feet to help thin skin 480 g 3     acitretin (SORIATANE) 25 MG CAPS capsule Take as directed (Patient not taking: Reported on 12/10/2018) 30 capsule 1     Alitretinoin 0.1 % GEL Externally apply topically 2 times daily (Patient not taking: Reported on 12/10/2018) 60 g 3     augmented betamethasone dipropionate (DIPROLENE-AF) 0.05 % ointment Apply to affected areas of hand twice daily, everyday. Use Vaniply on top of this medication. Occlude with gloves at night. (Patient not taking: Reported on 8/27/2018) 50 g 3     Dupilumab (DUPIXENT) 300 MG/2ML syringe Inject 2 mLs (300 mg) Subcutaneous every 14 days (Patient not taking: Reported on 8/27/2018) 2 mL 4     methylPREDNISolone (MEDROL DOSEPAK) 4 MG tablet Follow package instructions (Patient not taking: Reported on 8/27/2018) 21 tablet 0     predniSONE (DELTASONE) 1 MG tablet Take 8 tablets (8 mg) by mouth daily (Patient not taking: Reported on 8/27/2018) 240 tablet 3     predniSONE (DELTASONE) 10 MG tablet Take 1 tablet (10 mg) by mouth daily (Patient not taking: Reported on 8/27/2018) 7 tablet 0     predniSONE (DELTASONE) 10 MG tablet Take 40mg [id number] then [id number],, then 20mg qday until seen. (Patient not taking: Reported on 8/27/2018) 80 tablet 0     predniSONE (DELTASONE) 5 MG tablet 8,8,7,7,6,6,5,5,4,4,3,3,2,2,1,1, (Patient not taking: Reported on 8/27/2018) 72 tablet 0     predniSONE (DELTASONE) 5 MG tablet Take 1 tablet (5 mg) by  mouth daily (Patient not taking: Reported on 8/27/2018) 30 tablet 3     tacrolimus (PROTOPIC) 0.1 % external ointment Apply topically daily Use Monday- Friday. (Patient not taking: Reported on 2/11/2019) 60 g 3     tacrolimus (PROTOPIC) 0.1 % ointment Apply topically 2 times daily (Patient not taking: Reported on 10/8/2018) 60 g 3        Allergies   Allergen Reactions     Sulfadiazine      Patient declines this allergy. Other reaction(s): Hives/ Urticaria - Allergy     Tramadol      Patient declines this allergy. Other reaction(s): Shortness Of Breath/Wheezing/Chest Tightness - Allergy     Atorvastatin      Patient declines this allergy. Other reaction(s): Rash - Allergy     Bronopol Other (See Comments)     Previous Positive (+) skin patch test     Bacitracin Rash     Patch testing     Glutaraldehyde Rash     2+ patch testing     Gold Rash     Mild patch test reaction     Insulin Detemir      Patient declined this allergy. Other reaction(s): Headaches/ Migraines - Intolerance     Lisinopril      Patient declined this allergy. Other reaction(s): Headaches/ Migraines - Intolerance     Penicillins Rash     Sulfa Drugs Rash         Review of Systems:  -As per HPI  -Constitutional: The patient is otherwise in her normal state of health.   -Skin: As above in HPI. No additional skin concerns.    Physical exam:  Vitals: There were no vitals taken for this visit.  GEN: This is a well developed, well-nourished female in no acute distress, in a pleasant mood.    SKIN: Focused examination of the palms and soles was performed.  - Mild Hyperkeratosis of heels of feet. Minimal erythema. No large fissures  - Hands appear smooth with some mildly erythematous thin plaques on fingertips with fissuring of ~3 fingers.   - Overall significantly improved since last visit.     Order for PATCH TESTS      [] Inpatient / [place]....   Bed ....  [x] Outpatient    Skin Atopy           :       [x] Yes   [] No  Rhinitis/Sinusitis :       [x] Yes    [] No  Allergic Asthma   :       [] Yes   [] No  Leg ulcers            :       [] Yes   [x] No  Hand eczema       :       [x] Yes   [] No    Leading hand :  [] R [] L               [] Ambidextrous                      Reason for tests (Suspected allergy): contact allergy on hands/feet or Psoriasis  Known previous allergies: ...    [] Standardized panels  [x] Standard panel (40 tests)  [x] Preservatives & Antimicrobials (31 tests)  [x] Emulsifiers & Additives (25 tests)   [] Perfumes/Flavours & Plants (25 tests)  [] Hairdresser panel (12 tests)  [] Rubber Chemicals (22 tests)  [] Plastics (26 tests)  [x] Colorants/Dyes/Food additives (20 tests)  [] Metals (implants/dental) (23 tests)  [] Local anaesthetics/NSAIDs (12 tests)  [] Antibiotics & Antimycotics (14 tests)   [x] Corticosteroids (15 tests)   [] Photopatch test (32 tests)   [] others: ...    [] Patient's own products: ...    DO NOT test if chemical or biological identity is unknown!     always ask from patient the product information and safety sheets (MSDS)     [] Patient needs consultation with Allergy team  [x] Tests discussed with Allergy team (can have direct appointment for patch tests)    ==> put on back the patch tests on 07/23/2018 (added Colorants and dyes series --> patient thinks that dyes in money might play a role)  --> 1st readings on 07/25/2018: all tests were well sticking to the end and no skin reaction in any of the test fields. However, some itching over the corticosteroid series  --> 2nd readings on 07/27/2018: compositae +-++    RESULTS & EVALUATION of PATCH TESTS      Patch test readings after     [x] 2 days, [] 3 days [x] 4 days, [] 5 days,    [x] Allergic reaction diagnosed against: +-++ reaction to Compositae mix      Interpretation/ remarks:   Basically, all patch tests negatives. Some reaction to the compositae mix, but not sure how re levant this is. Patient cannot make a connection and does not recall that she has regular contact  with plant extract.   However, patient has a generalised dry skin and this could indicate for an atopic dermatitis without relevant contact dermatitis. This might be an indication for treatment with Dupilumab.     [x] Patient information given   [x] [employer] information    [x] Treatment prescribed: maybe try again treatment with Dupilumab. PUVA treatment or radiotherapy would be a possibility as well. Appeal to insurance for Dupilumab treatment.   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 Not possible for her to continue  -Tazorac (Alitretinoin topically) and Dupilumab would be too expensive for the patient. Insurance doesn't have lots of co-payment. Would be more than $1600.  - Future consideration would be Acitretin - can obtain from [facility] in [place] (paper script to pt who faxes it in)  - Taking Methotrexate 15mg weekly. (Take 5mg on a consecutive morning, evening, and morning.)   - decrease by 2.5mg per month as tolerated, Folic acid 1mg other 5 days of week  - Repeat CBC w/diff and hepatic panel today   - Continue using betamethasone Saturday and Sunday for thick or fissured areas. Tacrolimus M-F, under rubber thimbles as needed for fissuring  - Continue using Urea cream 20% for feet  - Use vanicream after handwashing and multiple times a day  - 3 month refills provided at today's visit (12/10/18)    Follow up in 2-3  months.    Staff Physician Comments:  I saw and evaluated the patient with the resident and I agree with the assessment and plan as documented in the resident's note.    Lester Aranda MD  Professor  Head of Dermato-Allergy Division  Department of Dermatology  [facility]  I spent a total of 20 min face to face with Kasey Ibrahim during today's office visit. About 50% of the time was spent counseling the patient and/or coordinating care regarding their allergy.      Resident (Gabriella Trotter MD) / Staff (as above)

## 2019-02-11 NOTE — PATIENT INSTRUCTIONS
Vanicream after every handwashing and throughout the day    Please decrease your Methotrexate by one pill per week (2-2-1) instead of (2-2-2)  If your skin is stable then decrease again after a month to (2-1-1)  If your skin flares then go back to previous dose    Try your topicals under rubber thimbles for your worst fingertips after applying tacrolimus or betamethasone    Continue urea on your feet twice daily

## 2019-02-11 NOTE — LETTER
2/11/2019       RE: Kasey Ibrahim  [id number] [facility] [id number]     Dear Colleague,    Thank you for referring your patient, Kasey Ibrahim, to the [facility] DERMATOLOGY at [facility]. Please see a copy of my visit note below.    [facility] Dermatology Note      Dermatology Problem List:  1.Chronic hand and foot eczema 2/2 toxic irritant vs allergic vs atopic psoriaform  - On Methotrexate 15mg weekly. (Takes 5mg for a consecutive morning, evening, morning.)   - decrease by 2.5mg per month as tolerated, Folic acid 1mg other 5 days of week  - Betamethasone 0.05% ointment BID on Saturday and Sunday  - Tacrolimus 0.1% ointment Monday through Friday  -  20% Urea cream on feet    Encounter Date: Feb 11, 2019    CC:   Chief Complaint   Patient presents with     Eczema     Kasey is here for follow up.         History of Present Illness:  Ms. Kasey Ibrahim is a 68 year old female who presents with chronic hand and foot eczema for past 4.5 years.  She has tried and failed: nb uvb, MMF, acitretin, methotrexate (all prior to presenting at the ), minocycline ( 8/2016-3/20/17), prednisone, clobetasol 0.05% ointment, CSA 3mg/kg/day, and single dose of 600mg dupilumab (unable to continue dupilumab due to high copayment).  She had previous allergy patch test in 2013 where she was only found to have +1 to gold and bacitracin, in other words, negative patch test. (See [facility]).  Repeat patch testing 7/23/18 showed possible positive to Compositae, but was otherwise negative. No personal or family hx of psoriasis or hay fever.  She uses T gel for posterior scalp itching, which helps.     Interval Hx:  Since her last visit Ms. Ibrahim states that her hand and foot dermatitis has greatly improved thinks to methotrexate.  She has been taking methotrexate as prescribed 5 mg on Tuesday morning, Tuesday evening, and Wednesday morning for total  dosing of 15 mg.  Folic acid was not prescribed in the past and so has not been taking this.  As far as her topicals are concerned, she has been using betamethasone ointment to the active areas on her hands and feet on a daily basis on the weekends with use of tacrolimus ointment once daily on weekdays.  She reports using urea cream on her feet primarily about twice a day and other lotions on the hands after handwashing approximately 3 times per day.  She is very happy with her progress and does not have any major concerns at today's visit.  She reports this is the best her hands of left in about 5 years time.  She denies any other changes in her health since her last visit.  Her last labs in December including hepatic panel and CBC were unremarkable.    Past Medical History:   Patient Active Problem List   Diagnosis     Cholangitis     Past Medical History:   Diagnosis Date     Depression      Hand dermatitis      Past Surgical History:   Procedure Laterality Date     FOOT SURGERY       PHACOEMULSIFICATION WITH STANDARD INTRAOCULAR LENS IMPLANT Left 7/3/2017    Procedure: PHACOEMULSIFICATION WITH STANDARD INTRAOCULAR LENS IMPLANT;  Left cataract removal with implant;  Surgeon: Dominguez Payne MD;  Location: [facility] OR     PHACOEMULSIFICATION WITH STANDARD INTRAOCULAR LENS IMPLANT Right 7/24/2017    Procedure: PHACOEMULSIFICATION WITH STANDARD INTRAOCULAR LENS IMPLANT;  Right cataract removal with implant;  Surgeon: Dominguez Payne MD;  Location: [facility] OR       Social History:  The patient works for [employer] as a .     Family History:  No family hx of atopic dermatitis, hay fever, psoriasis  Brother- Scleroderm, Raynaud's.     Medications:  Current Outpatient Medications   Medication Sig Dispense Refill     betamethasone dipropionate (DIPROSONE) 0.05 % external ointment Use on Saturday and Sunday. 50 g 3     betamethasone dipropionate (DIPROSONE) 0.05 % ointment Apply topically twice a week 50 g 3      BuPROPion HCl (WELLBUTRIN PO)        Calcium carb-Vitamin D 500 mg [place]-200 units (OSCAL WITH D;OYSTER SHELL CALCIUM) 500-200 MG-UNIT per tablet Take 1 tablet by mouth daily       clobetasol (TEMOVATE) 0.05 % ointment Apply thin layer twice daily to hands and feet.  Soak hands before application at bedtime. 60 g 11     Dupilumab (DUPIXENT) 300 MG/2ML syringe Inject 2 mLs (300 mg) Subcutaneous every 14 days 4 mL 5     FLUOXETINE HCL PO        methocarbamol (ROBAXIN) 500 MG tablet Take 500 mg by mouth       methotrexate sodium 2.5 MG TABS Take 6 tablets (15 mg) by mouth once a week Take 2 tablets one morning,  2 tablets that evening. Then 2 tablets the following morning. 24 tablet 2     naproxen (NAPROSYN) 500 MG tablet Take 500 mg by mouth       urea (GORMEL) 20 % external cream Apply daily to feet to help thin skin 480 g 3     acitretin (SORIATANE) 25 MG CAPS capsule Take as directed (Patient not taking: Reported on 12/10/2018) 30 capsule 1     Alitretinoin 0.1 % GEL Externally apply topically 2 times daily (Patient not taking: Reported on 12/10/2018) 60 g 3     augmented betamethasone dipropionate (DIPROLENE-AF) 0.05 % ointment Apply to affected areas of hand twice daily, everyday. Use Vaniply on top of this medication. Occlude with gloves at night. (Patient not taking: Reported on 8/27/2018) 50 g 3     Dupilumab (DUPIXENT) 300 MG/2ML syringe Inject 2 mLs (300 mg) Subcutaneous every 14 days (Patient not taking: Reported on 8/27/2018) 2 mL 4     methylPREDNISolone (MEDROL DOSEPAK) 4 MG tablet Follow package instructions (Patient not taking: Reported on 8/27/2018) 21 tablet 0     predniSONE (DELTASONE) 1 MG tablet Take 8 tablets (8 mg) by mouth daily (Patient not taking: Reported on 8/27/2018) 240 tablet 3     predniSONE (DELTASONE) 10 MG tablet Take 1 tablet (10 mg) by mouth daily (Patient not taking: Reported on 8/27/2018) 7 tablet 0     predniSONE (DELTASONE) 10 MG tablet Take 40mg [id number] then  [id number],, then 20mg qday until seen. (Patient not taking: Reported on 8/27/2018) 80 tablet 0     predniSONE (DELTASONE) 5 MG tablet 8,8,7,7,6,6,5,5,4,4,3,3,2,2,1,1, (Patient not taking: Reported on 8/27/2018) 72 tablet 0     predniSONE (DELTASONE) 5 MG tablet Take 1 tablet (5 mg) by mouth daily (Patient not taking: Reported on 8/27/2018) 30 tablet 3     tacrolimus (PROTOPIC) 0.1 % external ointment Apply topically daily Use Monday- Friday. (Patient not taking: Reported on 2/11/2019) 60 g 3     tacrolimus (PROTOPIC) 0.1 % ointment Apply topically 2 times daily (Patient not taking: Reported on 10/8/2018) 60 g 3        Allergies   Allergen Reactions     Sulfadiazine      Patient declines this allergy. Other reaction(s): Hives/ Urticaria - Allergy     Tramadol      Patient declines this allergy. Other reaction(s): Shortness Of Breath/Wheezing/Chest Tightness - Allergy     Atorvastatin      Patient declines this allergy. Other reaction(s): Rash - Allergy     Bronopol Other (See Comments)     Previous Positive (+) skin patch test     Bacitracin Rash     Patch testing     Glutaraldehyde Rash     2+ patch testing     Gold Rash     Mild patch test reaction     Insulin Detemir      Patient declined this allergy. Other reaction(s): Headaches/ Migraines - Intolerance     Lisinopril      Patient declined this allergy. Other reaction(s): Headaches/ Migraines - Intolerance     Penicillins Rash     Sulfa Drugs Rash         Review of Systems:  -As per HPI  -Constitutional: The patient is otherwise in her normal state of health.   -Skin: As above in HPI. No additional skin concerns.    Physical exam:  Vitals: There were no vitals taken for this visit.  GEN: This is a well developed, well-nourished female in no acute distress, in a pleasant mood.    SKIN: Focused examination of the palms and soles was performed.  - Mild Hyperkeratosis of heels of feet. Minimal erythema. No large fissures  - Hands appear smooth with some mildly  erythematous thin plaques on fingertips with fissuring of ~3 fingers.   - Overall significantly improved since last visit.     Order for PATCH TESTS      [] Inpatient / [place]....   Bed ....  [x] Outpatient    Skin Atopy           :       [x] Yes   [] No  Rhinitis/Sinusitis :       [x] Yes   [] No  Allergic Asthma   :       [] Yes   [] No  Leg ulcers            :       [] Yes   [x] No  Hand eczema       :       [x] Yes   [] No    Leading hand :  [] R [] L               [] Ambidextrous                      Reason for tests (Suspected allergy): contact allergy on hands/feet or Psoriasis  Known previous allergies: ...    [] Standardized panels  [x] Standard panel (40 tests)  [x] Preservatives & Antimicrobials (31 tests)  [x] Emulsifiers & Additives (25 tests)   [] Perfumes/Flavours & Plants (25 tests)  [] Hairdresser panel (12 tests)  [] Rubber Chemicals (22 tests)  [] Plastics (26 tests)  [x] Colorants/Dyes/Food additives (20 tests)  [] Metals (implants/dental) (23 tests)  [] Local anaesthetics/NSAIDs (12 tests)  [] Antibiotics & Antimycotics (14 tests)   [x] Corticosteroids (15 tests)   [] Photopatch test (32 tests)   [] others: ...    [] Patient's own products: ...    DO NOT test if chemical or biological identity is unknown!     always ask from patient the product information and safety sheets (MSDS)     [] Patient needs consultation with Allergy team  [x] Tests discussed with Allergy team (can have direct appointment for patch tests)    ==> put on back the patch tests on 07/23/2018 (added Colorants and dyes series --> patient thinks that dyes in money might play a role)  --> 1st readings on 07/25/2018: all tests were well sticking to the end and no skin reaction in any of the test fields. However, some itching over the corticosteroid series  --> 2nd readings on 07/27/2018: compositae +-++    RESULTS & EVALUATION of PATCH TESTS      Patch test readings after     [x] 2 days, [] 3 days [x] 4 days, [] 5 days,    [x]  Allergic reaction diagnosed against: +-++ reaction to Compositae mix      Interpretation/ remarks:   Basically, all patch tests negatives. Some reaction to the compositae mix, but not sure how re levant this is. Patient cannot make a connection and does not recall that she has regular contact with plant extract.   However, patient has a generalised dry skin and this could indicate for an atopic dermatitis without relevant contact dermatitis. This might be an indication for treatment with Dupilumab.     [x] Patient information given   [x] [employer] information    [x] Treatment prescribed: maybe try again treatment with Dupilumab. PUVA treatment or radiotherapy would be a possibility as well. Appeal to insurance for Dupilumab treatment.   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 Not possible for her to continue  -Tazorac (Alitretinoin topically) and Dupilumab would be too expensive for the patient. Insurance doesn't have lots of co-payment. Would be more than $1600.  - Future consideration would be Acitretin - can obtain from [facility] in [place] (paper script to pt who faxes it in)  - Taking Methotrexate 15mg weekly. (Take 5mg on a consecutive morning, evening, and morning.)   - decrease by 2.5mg per month as tolerated, Folic acid 1mg other 5 days of week  - Repeat CBC w/diff and hepatic panel today   - Continue using betamethasone  Saturday and Sunday for thick or fissured areas. Tacrolimus M-F, under rubber thimbles as needed for fissuring  - Continue using Urea cream 20% for feet  - Use vanicream after handwashing and multiple times a day  - 3 month refills provided at today's visit (12/10/18)    Follow up in 2-3 months.    Staff Physician Comments:  I saw and evaluated the patient with the resident and I agree with the assessment and plan as documented in the resident's note.    Lester Aranda MD  Professor  Head of Dermato-Allergy Division  Department of Dermatology  [facility]  I spent a total of 20 min face to face with Kasey ONEIL Patrice during today's office visit. About 50% of the time was spent counseling the patient and/or coordinating care regarding their allergy.      Resident (Gabriella Trotter MD) / Staff (as above)

## 2019-04-22 ENCOUNTER — OFFICE VISIT (OUTPATIENT)
Dept: DERMATOLOGY | Facility: CLINIC | Age: 69
End: 2019-04-22
Payer: MEDICARE

## 2019-04-22 DIAGNOSIS — L30.9 CHRONIC ECZEMA OF HAND: ICD-10-CM

## 2019-04-22 RX ORDER — FOLIC ACID 1 MG/1
1 TABLET ORAL DAILY
Qty: 90 TABLET | Refills: 3 | Status: SHIPPED | OUTPATIENT
Start: 2019-04-22 | End: 2019-07-22

## 2019-04-22 RX ORDER — METHOTREXATE 2.5 MG/1
15 TABLET ORAL WEEKLY
Qty: 24 TABLET | Refills: 3 | Status: SHIPPED | OUTPATIENT
Start: 2019-04-22 | End: 2019-07-22

## 2019-04-22 ASSESSMENT — PAIN SCALES - GENERAL: PAINLEVEL: NO PAIN (0)

## 2019-04-22 NOTE — PROGRESS NOTES
[facility] Dermatology Note      Dermatology Problem List:  1.Chronic hand and foot eczema 2/2 toxic irritant vs allergic vs atopic psoriaform  - On Methotrexate 15mg weekly. (Takes 5mg for a consecutive morning, evening, morning.)   - decrease to 12.5mg weekly dose if tolerated, Folic acid 1mg other 5 days of week   - Cumulative dose as of 4/22/19 ~420mg (started 11/2018)  - Betamethasone 0.05% ointment BID on Saturday and Sunday  - Tacrolimus 0.1% ointment Monday through Friday  -  20% Urea cream on feet    Encounter Date: Apr 22, 2019    CC:   Chief Complaint   Patient presents with     Derm Problem     Kasey is here for eczema follow up .         History of Present Illness:  Ms. Kasey Ibrahim is a 68 year old female who presents with chronic hand and foot eczema for past 4.5 years.  She has tried and failed: nb uvb, MMF, acitretin, methotrexate (all prior to presenting at the ), minocycline ( 8/2016-3/20/17), prednisone, clobetasol 0.05% ointment, CSA 3mg/kg/day, and single dose of 600mg dupilumab (unable to continue dupilumab due to high copayment).  She had previous allergy patch test in 2013 where she was only found to have +1 to gold and bacitracin, in other words, negative patch test. (See [facility]).  Repeat patch testing 7/23/18 showed possible positive to Compositae, but was otherwise negative. No personal or family hx of psoriasis or hay fever.  She uses T gel for posterior scalp itching, which helps.     Interval Hx:  Patient was last seen 2/11/19 at which time her hands were significantly improved with her current regimen of methotrexate and topical antiinflammatories. Her labs were checked at that visit and within normal limits. The plan at that time was to try reducing her methotrexate dose by 2.5mg per month. Since her last appointment she continues to do well. She has been taking methotrexate as prescribed 5 mg on Tuesday morning, Tuesday evening, and  Wednesday morning for total dosing of 15 mg. Tried reducing to 2,2,1 for a total of 12.5mg but started to flare. Folic acid on other days of the week. With regard to topicals, she has been using betamethasone and tacrolimus ointment to the active areas on her hands and feet, alternating the two every other day.  She reports using urea cream on her feet primarily about twice a day and other lotions on the hands after handwashing approximately 3 times per day.  She has no new concerns today and is otherwise in her general state of health.    Past Medical History:   Patient Active Problem List   Diagnosis     Cholangitis     Past Medical History:   Diagnosis Date     Depression      Hand dermatitis      Past Surgical History:   Procedure Laterality Date     FOOT SURGERY       PHACOEMULSIFICATION WITH STANDARD INTRAOCULAR LENS IMPLANT Left 7/3/2017    Procedure: PHACOEMULSIFICATION WITH STANDARD INTRAOCULAR LENS IMPLANT;  Left cataract removal with implant;  Surgeon: Dominguez Payne MD;  Location: [facility] OR     PHACOEMULSIFICATION WITH STANDARD INTRAOCULAR LENS IMPLANT Right 7/24/2017    Procedure: PHACOEMULSIFICATION WITH STANDARD INTRAOCULAR LENS IMPLANT;  Right cataract removal with implant;  Surgeon: Dominguez Payne MD;  Location: [facility] OR       Social History:  The patient works for [employer] as a .     Family History:  No family hx of atopic dermatitis, hay fever, psoriasis  Brother- Scleroderm, Raynaud's.     Medications:  Current Outpatient Medications   Medication Sig Dispense Refill     betamethasone dipropionate (DIPROSONE) 0.05 % external ointment Use on Saturday and Sunday. 50 g 3     betamethasone dipropionate (DIPROSONE) 0.05 % ointment Apply topically twice a week 50 g 3     BuPROPion HCl (WELLBUTRIN PO)        Calcium carb-Vitamin D 500 mg Agdaagux-200 units (OSCAL WITH D;OYSTER SHELL CALCIUM) 500-200 MG-UNIT per tablet Take 1 tablet by mouth daily       clobetasol (TEMOVATE) 0.05 % ointment  Apply thin layer twice daily to hands and feet.  Soak hands before application at bedtime. 60 g 11     Dupilumab (DUPIXENT) 300 MG/2ML syringe Inject 2 mLs (300 mg) Subcutaneous every 14 days 2 mL 4     FLUOXETINE HCL PO        folic acid (FOLVITE) 1 MG tablet Take 1 tablet (1 mg) by mouth daily On all days except days you take methotrexate 90 tablet 3     methocarbamol (ROBAXIN) 500 MG tablet Take 500 mg by mouth       methotrexate sodium 2.5 MG TABS Take 6 tablets (15 mg) by mouth once a week 24 tablet 3     methotrexate sodium 2.5 MG TABS Take 6 tablets (15 mg) by mouth once a week Take 2 tablets one morning,  2 tablets that evening. Then 2 tablets the following morning. 24 tablet 2     naproxen (NAPROSYN) 500 MG tablet Take 500 mg by mouth       urea (GORMEL) 20 % external cream Apply daily to feet to help thin skin 480 g 3     acitretin (SORIATANE) 25 MG CAPS capsule Take as directed (Patient not taking: Reported on 12/10/2018) 30 capsule 1     Alitretinoin 0.1 % GEL Externally apply topically 2 times daily (Patient not taking: Reported on 12/10/2018) 60 g 3     augmented betamethasone dipropionate (DIPROLENE-AF) 0.05 % ointment Apply to affected areas of hand twice daily, everyday. Use Vaniply on top of this medication. Occlude with gloves at night. (Patient not taking: Reported on 8/27/2018) 50 g 3     methylPREDNISolone (MEDROL DOSEPAK) 4 MG tablet Follow package instructions (Patient not taking: Reported on 8/27/2018) 21 tablet 0     predniSONE (DELTASONE) 1 MG tablet Take 8 tablets (8 mg) by mouth daily (Patient not taking: Reported on 8/27/2018) 240 tablet 3     predniSONE (DELTASONE) 10 MG tablet Take 1 tablet (10 mg) by mouth daily (Patient not taking: Reported on 8/27/2018) 7 tablet 0     predniSONE (DELTASONE) 10 MG tablet Take 40mg [id number] then [id number],, then 20mg qday until seen. (Patient not taking: Reported on 8/27/2018) 80 tablet 0     predniSONE (DELTASONE) 5 MG tablet  8,8,7,7,6,6,5,5,4,4,3,3,2,2,1,1, (Patient not taking: Reported on 8/27/2018) 72 tablet 0     predniSONE (DELTASONE) 5 MG tablet Take 1 tablet (5 mg) by mouth daily (Patient not taking: Reported on 8/27/2018) 30 tablet 3     tacrolimus (PROTOPIC) 0.1 % external ointment Apply topically daily Use Monday- Friday. (Patient not taking: Reported on 2/11/2019) 60 g 3     tacrolimus (PROTOPIC) 0.1 % ointment Apply topically 2 times daily (Patient not taking: Reported on 10/8/2018) 60 g 3        Allergies   Allergen Reactions     Sulfadiazine      Patient declines this allergy. Other reaction(s): Hives/ Urticaria - Allergy     Tramadol      Patient declines this allergy. Other reaction(s): Shortness Of Breath/Wheezing/Chest Tightness - Allergy     Atorvastatin      Patient declines this allergy. Other reaction(s): Rash - Allergy     Bronopol Other (See Comments)     Previous Positive (+) skin patch test     Bacitracin Rash     Patch testing     Glutaraldehyde Rash     2+ patch testing     Gold Rash     Mild patch test reaction     Insulin Detemir      Patient declined this allergy. Other reaction(s): Headaches/ Migraines - Intolerance     Lisinopril      Patient declined this allergy. Other reaction(s): Headaches/ Migraines - Intolerance     Penicillins Rash     Sulfa Drugs Rash         Review of Systems:  -As per HPI  -Constitutional: The patient is otherwise in her normal state of health.   -Skin: As above in HPI. No additional skin concerns.    Physical exam:  Vitals: There were no vitals taken for this visit.  GEN: This is a well developed, well-nourished female in no acute distress, in a pleasant mood.    SKIN: Focused examination of the palms and soles was performed.  - Mild Hyperkeratosis of heels of feet. No erythema. No fissures  - Hands appear smooth with some mildly erythematous thin plaques on fingertips with superficial fissuring of ~3 fingers.   - Overall stable improved since last visit.     Order for PATCH  TESTS      [] Inpatient / [place]....   Bed ....  [x] Outpatient    Skin Atopy           :       [x] Yes   [] No  Rhinitis/Sinusitis :       [x] Yes   [] No  Allergic Asthma   :       [] Yes   [] No  Leg ulcers            :       [] Yes   [x] No  Hand eczema       :       [x] Yes   [] No    Leading hand :  [] R [] L               [] Ambidextrous                      Reason for tests (Suspected allergy): contact allergy on hands/feet or Psoriasis  Known previous allergies: ...    [] Standardized panels  [x] Standard panel (40 tests)  [x] Preservatives & Antimicrobials (31 tests)  [x] Emulsifiers & Additives (25 tests)   [] Perfumes/Flavours & Plants (25 tests)  [] Hairdresser panel (12 tests)  [] Rubber Chemicals (22 tests)  [] Plastics (26 tests)  [x] Colorants/Dyes/Food additives (20 tests)  [] Metals (implants/dental) (23 tests)  [] Local anaesthetics/NSAIDs (12 tests)  [] Antibiotics & Antimycotics (14 tests)   [x] Corticosteroids (15 tests)   [] Photopatch test (32 tests)   [] others: ...    [] Patient's own products: ...    DO NOT test if chemical or biological identity is unknown!     always ask from patient the product information and safety sheets (MSDS)     [] Patient needs consultation with Allergy team  [x] Tests discussed with Allergy team (can have direct appointment for patch tests)    ==> put on back the patch tests on 07/23/2018 (added Colorants and dyes series --> patient thinks that dyes in money might play a role)  --> 1st readings on 07/25/2018: all tests were well sticking to the end and no skin reaction in any of the test fields. However, some itching over the corticosteroid series  --> 2nd readings on 07/27/2018: compositae +-++    RESULTS & EVALUATION of PATCH TESTS      Patch test readings after     [x] 2 days, [] 3 days [x] 4 days, [] 5 days,    [x] Allergic reaction diagnosed against: +-++ reaction to Compositae mix      Interpretation/ remarks:   Basically, all patch tests negatives. Some  reaction to the compositae mix, but not sure how re levant this is. Patient cannot make a connection and does not recall that she has regular contact with plant extract.   However, patient has a generalised dry skin and this could indicate for an atopic dermatitis without relevant contact dermatitis. This might be an indication for treatment with Dupilumab.     [x] Patient information given   [x] [employer] information    [x] Treatment prescribed: maybe try again treatment with Dupilumab. PUVA treatment or radiotherapy would be a possibility as well. Appeal to insurance for Dupilumab treatment.   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 Not possible for her to continue  -Tazorac (Alitretinoin topically) and Dupilumab would be too expensive for the patient. Insurance doesn't have lots of co-payment. Would be more than $1600.  - Future consideration would be Acitretin - can obtain from [facility] in [place] (paper script to pt who faxes it in)  - Taking Methotrexate 15mg weekly. (Continue 5mg on a consecutive morning, evening, and morning.)   - Continue Folic acid 1mg other 5 days of week   - Can try to reduce to 12.5mg as able  - Continue alternating betamethasone and tacrolimus to fissured areas, can use under rubber thimbles as needed for fissuring  - Continue using Urea cream 20% for feet  - Use vanicream after handwashing and multiple  times a day  - 4 month of refills for methotrexate and folic acid provided at today's visit  - Cumulative dose today ~420mg (started 11/2018)    Follow up in 4 months with labs at that appt.      Resident (Karlos Vega MD) / Staff (as above)    Karlos Vega MD  Dermatology Resident, PGY3    Staff Physician Comments:  I saw and evaluated the patient with the resident and I agree with the assessment and plan as documented in the resident's note.    Lester Aranda MD  Professor  Head of Dermato-Allergy Division  Department of Dermatology  [facility]  I spent a total of 15 min face to face with Kasey M Patrice during today's office visit. About 50% of the time was spent counseling the patient and/or coordinating care regarding their allergy.

## 2019-04-22 NOTE — PATIENT INSTRUCTIONS
Continue the methotrexate 15mg weekly (2 tablets tues morning, 2 tablets tues evening and 2 tablets wed morning).  If able try to reduce as you did before by decreasing the wed morning dose to just 1 tablet.  Continue your topical regimen as you are.  We will see you in 4 month.

## 2019-04-22 NOTE — LETTER
4/22/2019       RE: Kasey Ibrahim  [id number] [facility], [place], operated by [facility] [id number]     Dear Colleague,    Thank you for referring your patient, Kasey Ibrahim, to the [facility] DERMATOLOGY at [facility]. Please see a copy of my visit note below.    [facility] Dermatology Note      Dermatology Problem List:  1.Chronic hand and foot eczema 2/2 toxic irritant vs allergic vs atopic psoriaform  - On Methotrexate 15mg weekly. (Takes 5mg for a consecutive morning, evening, morning.)   - decrease to 12.5mg weekly dose if tolerated, Folic acid 1mg other 5 days of week   - Cumulative dose as of 4/22/19 ~420mg (started 11/2018)  - Betamethasone 0.05% ointment BID on Saturday and Sunday  - Tacrolimus 0.1% ointment Monday through Friday  -  20% Urea cream on feet    Encounter Date: Apr 22, 2019    CC:   Chief Complaint   Patient presents with     Derm Problem     Kasey is here for eczema follow up .         History of Present Illness:  Ms. Kasey Ibrahim is a 68 year old female who presents with chronic hand and foot eczema for past 4.5 years.  She has tried and failed: nb uvb, MMF, acitretin, methotrexate (all prior to presenting at the ), minocycline ( 8/2016-3/20/17), prednisone, clobetasol 0.05% ointment, CSA 3mg/kg/day, and single dose of 600mg dupilumab (unable to continue dupilumab due to high copayment).  She had previous allergy patch test in 2013 where she was only found to have +1 to gold and bacitracin, in other words, negative patch test. (See [facility] records from [facility]).  Repeat patch testing 7/23/18 showed possible positive to Compositae, but was otherwise negative. No personal or family hx of psoriasis or hay fever.  She uses T gel for posterior scalp itching, which helps.     Interval Hx:  Patient was last seen 2/11/19 at which time her hands were significantly improved with her current regimen of methotrexate and topical antiinflammatories. Her  labs were checked at that visit and within normal limits. The plan at that time was to try reducing her methotrexate dose by 2.5mg per month. Since her last appointment she continues to do well. She has been taking methotrexate as prescribed 5 mg on Tuesday morning, Tuesday evening, and Wednesday morning for total dosing of 15 mg. Tried reducing to 2,2,1 for a total of 12.5mg but started to flare. Folic acid on other days of the week. With regard to topicals, she has been using betamethasone and tacrolimus ointment to the active areas on her hands and feet, alternating the two every other day.  She reports using urea cream on her feet primarily about twice a day and other lotions on the hands after handwashing approximately 3 times per day.  She has no new concerns today and is otherwise in her general state of health.    Past Medical History:   Patient Active Problem List   Diagnosis     Cholangitis     Past Medical History:   Diagnosis Date     Depression      Hand dermatitis      Past Surgical History:   Procedure Laterality Date     FOOT SURGERY       PHACOEMULSIFICATION WITH STANDARD INTRAOCULAR LENS IMPLANT Left 7/3/2017    Procedure: PHACOEMULSIFICATION WITH STANDARD INTRAOCULAR LENS IMPLANT;  Left cataract removal with implant;  Surgeon: Dominguez Payne MD;  Location: [facility] OR     PHACOEMULSIFICATION WITH STANDARD INTRAOCULAR LENS IMPLANT Right 7/24/2017    Procedure: PHACOEMULSIFICATION WITH STANDARD INTRAOCULAR LENS IMPLANT;  Right cataract removal with implant;  Surgeon: Dominguez Payne MD;  Location: [facility] OR       Social History:  The patient works for [employer] as a .     Family History:  No family hx of atopic dermatitis, hay fever, psoriasis  Brother- Scleroderm, Raynaud's.     Medications:  Current Outpatient Medications   Medication Sig Dispense Refill     betamethasone dipropionate (DIPROSONE) 0.05 % external ointment Use on Saturday and Sunday. 50 g 3     betamethasone  dipropionate (DIPROSONE) 0.05 % ointment Apply topically twice a week 50 g 3     BuPROPion HCl (WELLBUTRIN PO)        Calcium carb-Vitamin D 500 mg [place]-200 units (OSCAL WITH D;OYSTER SHELL CALCIUM) 500-200 MG-UNIT per tablet Take 1 tablet by mouth daily       clobetasol (TEMOVATE) 0.05 % ointment Apply thin layer twice daily to hands and feet.  Soak hands before application at bedtime. 60 g 11     Dupilumab (DUPIXENT) 300 MG/2ML syringe Inject 2 mLs (300 mg) Subcutaneous every 14 days 2 mL 4     FLUOXETINE HCL PO        folic acid (FOLVITE) 1 MG tablet Take 1 tablet (1 mg) by mouth daily On all days except days you take methotrexate 90 tablet 3     methocarbamol (ROBAXIN) 500 MG tablet Take 500 mg by mouth       methotrexate sodium 2.5 MG TABS Take 6 tablets (15 mg) by mouth once a week 24 tablet 3     methotrexate sodium 2.5 MG TABS Take 6 tablets (15 mg) by mouth once a week Take 2 tablets one morning,  2 tablets that evening. Then 2 tablets the following morning. 24 tablet 2     naproxen (NAPROSYN) 500 MG tablet Take 500 mg by mouth       urea (GORMEL) 20 % external cream Apply daily to feet to help thin skin 480 g 3     acitretin (SORIATANE) 25 MG CAPS capsule Take as directed (Patient not taking: Reported on 12/10/2018) 30 capsule 1     Alitretinoin 0.1 % GEL Externally apply topically 2 times daily (Patient not taking: Reported on 12/10/2018) 60 g 3     augmented betamethasone dipropionate (DIPROLENE-AF) 0.05 % ointment Apply to affected areas of hand twice daily, everyday. Use Vaniply on top of this medication. Occlude with gloves at night. (Patient not taking: Reported on 8/27/2018) 50 g 3     methylPREDNISolone (MEDROL DOSEPAK) 4 MG tablet Follow package instructions (Patient not taking: Reported on 8/27/2018) 21 tablet 0     predniSONE (DELTASONE) 1 MG tablet Take 8 tablets (8 mg) by mouth daily (Patient not taking: Reported on 8/27/2018) 240 tablet 3     predniSONE (DELTASONE) 10 MG tablet Take 1 tablet  (10 mg) by mouth daily (Patient not taking: Reported on 8/27/2018) 7 tablet 0     predniSONE (DELTASONE) 10 MG tablet Take 40mg [id number] then [id number],, then 20mg qday until seen. (Patient not taking: Reported on 8/27/2018) 80 tablet 0     predniSONE (DELTASONE) 5 MG tablet 8,8,7,7,6,6,5,5,4,4,3,3,2,2,1,1, (Patient not taking: Reported on 8/27/2018) 72 tablet 0     predniSONE (DELTASONE) 5 MG tablet Take 1 tablet (5 mg) by mouth daily (Patient not taking: Reported on 8/27/2018) 30 tablet 3     tacrolimus (PROTOPIC) 0.1 % external ointment Apply topically daily Use Monday- Friday. (Patient not taking: Reported on 2/11/2019) 60 g 3     tacrolimus (PROTOPIC) 0.1 % ointment Apply topically 2 times daily (Patient not taking: Reported on 10/8/2018) 60 g 3        Allergies   Allergen Reactions     Sulfadiazine      Patient declines this allergy. Other reaction(s): Hives/ Urticaria - Allergy     Tramadol      Patient declines this allergy. Other reaction(s): Shortness Of Breath/Wheezing/Chest Tightness - Allergy     Atorvastatin      Patient declines this allergy. Other reaction(s): Rash - Allergy     Bronopol Other (See Comments)     Previous Positive (+) skin patch test     Bacitracin Rash     Patch testing     Glutaraldehyde Rash     2+ patch testing     Gold Rash     Mild patch test reaction     Insulin Detemir      Patient declined this allergy. Other reaction(s): Headaches/ Migraines - Intolerance     Lisinopril      Patient declined this allergy. Other reaction(s): Headaches/ Migraines - Intolerance     Penicillins Rash     Sulfa Drugs Rash         Review of Systems:  -As per HPI  -Constitutional: The patient is otherwise in her normal state of health.   -Skin: As above in HPI. No additional skin concerns.    Physical exam:  Vitals: There were no vitals taken for this visit.  GEN: This is a well developed, well-nourished female in no acute distress, in a pleasant mood.    SKIN: Focused examination of the  palms and soles was performed.  - Mild Hyperkeratosis of heels of feet. No erythema. No fissures  - Hands appear smooth with some mildly erythematous thin plaques on fingertips with superficial fissuring of ~3 fingers.   - Overall stable improved since last visit.     Order for PATCH TESTS      [] Inpatient / [place]....   Bed ....  [x] Outpatient    Skin Atopy           :       [x] Yes   [] No  Rhinitis/Sinusitis :       [x] Yes   [] No  Allergic Asthma   :       [] Yes   [] No  Leg ulcers            :       [] Yes   [x] No  Hand eczema       :       [x] Yes   [] No    Leading hand :  [] R [] L               [] Ambidextrous                      Reason for tests (Suspected allergy): contact allergy on hands/feet or Psoriasis  Known previous allergies: ...    [] Standardized panels  [x] Standard panel (40 tests)  [x] Preservatives & Antimicrobials (31 tests)  [x] Emulsifiers & Additives (25 tests)   [] Perfumes/Flavours & Plants (25 tests)  [] Hairdresser panel (12 tests)  [] Rubber Chemicals (22 tests)  [] Plastics (26 tests)  [x] Colorants/Dyes/Food additives (20 tests)  [] Metals (implants/dental) (23 tests)  [] Local anaesthetics/NSAIDs (12 tests)  [] Antibiotics & Antimycotics (14 tests)   [x] Corticosteroids (15 tests)   [] Photopatch test (32 tests)   [] others: ...    [] Patient's own products: ...    DO NOT test if chemical or biological identity is unknown!     always ask from patient the product information and safety sheets (MSDS)     [] Patient needs consultation with Allergy team  [x] Tests discussed with Allergy team (can have direct appointment for patch tests)    ==> put on back the patch tests on 07/23/2018 (added Colorants and dyes series --> patient thinks that dyes in money might play a role)  --> 1st readings on 07/25/2018: all tests were well sticking to the end and no skin reaction in any of the test fields. However, some itching over the corticosteroid series  --> 2nd readings on 07/27/2018:  compositae +-++    RESULTS & EVALUATION of PATCH TESTS      Patch test readings after     [x] 2 days, [] 3 days [x] 4 days, [] 5 days,    [x] Allergic reaction diagnosed against: +-++ reaction to Compositae mix      Interpretation/ remarks:   Basically, all patch tests negatives. Some reaction to the compositae mix, but not sure how re levant this is. Patient cannot make a connection and does not recall that she has regular contact with plant extract.   However, patient has a generalised dry skin and this could indicate for an atopic dermatitis without relevant contact dermatitis. This might be an indication for treatment with Dupilumab.     [x] Patient information given   [x] [employer] information    [x] Treatment prescribed: maybe try again treatment with Dupilumab. PUVA treatment or radiotherapy would be a possibility as well. Appeal to insurance for Dupilumab treatment.   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 Not possible for her to continue  -Tazorac (Alitretinoin topically) and Dupilumab would be too expensive for the patient. Insurance doesn't have lots of co-payment. Would be more than $1600.  - Future consideration would be Acitretin - can obtain from [facility] in [place] (paper script to pt who faxes it in)  - Taking Methotrexate 15mg weekly. (Continue 5mg on a consecutive morning, evening, and morning.)   - Continue Folic  acid 1mg other 5 days of week   - Can try to reduce to 12.5mg as able  - Continue alternating betamethasone and tacrolimus to fissured areas, can use under rubber thimbles as needed for fissuring  - Continue using Urea cream 20% for feet  - Use vanicream after handwashing and multiple times a day  - 4 month of refills for methotrexate and folic acid provided at today's visit  - Cumulative dose today ~420mg (started 11/2018)    Follow up in 4 months with labs at that appt.      Resident (Karlos Vega MD) / Staff (as above)    Karlos Vega MD  Dermatology Resident, PGY3    Staff Physician Comments:  I saw and evaluated the patient with the resident and I agree with the assessment and plan as documented in the resident's note.      I spent a total of 15 min face to face with Kasey Ibrahim during today's office visit. About 50% of the time was spent counseling the patient and/or coordinating care regarding their allergy.      Again, thank you for allowing me to participate in the care of your patient.      Sincerely,    Lester Aranda MD

## 2019-04-22 NOTE — NURSING NOTE
Dermatology Rooming Note    Kasey Ibrahim's goals for this visit include:   Chief Complaint   Patient presents with     Derm Problem     Kasey is here for eczema follow up .     Ruby Munoz, CMA

## 2019-07-22 ENCOUNTER — OFFICE VISIT (OUTPATIENT)
Dept: DERMATOLOGY | Facility: CLINIC | Age: 69
End: 2019-07-22
Payer: MEDICARE

## 2019-07-22 ENCOUNTER — APPOINTMENT (OUTPATIENT)
Dept: LAB | Facility: CLINIC | Age: 69
End: 2019-07-22
Payer: MEDICARE

## 2019-07-22 DIAGNOSIS — L25.8 CONTACT DERMATITIS DUE TO OTHER AGENT, UNSPECIFIED CONTACT DERMATITIS TYPE: ICD-10-CM

## 2019-07-22 DIAGNOSIS — L30.9 CHRONIC ECZEMA OF HAND: Primary | ICD-10-CM

## 2019-07-22 DIAGNOSIS — L40.9 PSORIASIS: ICD-10-CM

## 2019-07-22 LAB
ALBUMIN SERPL-MCNC: 3.9 G/DL (ref 3.4–5)
ALP SERPL-CCNC: 58 U/L (ref 40–150)
ALT SERPL W P-5'-P-CCNC: 25 U/L (ref 0–50)
AST SERPL W P-5'-P-CCNC: 17 U/L (ref 0–45)
BASOPHILS # BLD AUTO: 0.1 10E9/L (ref 0–0.2)
BASOPHILS NFR BLD AUTO: 1 %
BILIRUB DIRECT SERPL-MCNC: <0.1 MG/DL (ref 0–0.2)
BILIRUB SERPL-MCNC: 0.4 MG/DL (ref 0.2–1.3)
CREAT SERPL-MCNC: 0.61 MG/DL (ref 0.52–1.04)
DIFFERENTIAL METHOD BLD: ABNORMAL
EOSINOPHIL # BLD AUTO: 0.2 10E9/L (ref 0–0.7)
EOSINOPHIL NFR BLD AUTO: 3.1 %
ERYTHROCYTE [DISTWIDTH] IN BLOOD BY AUTOMATED COUNT: 14 % (ref 10–15)
GFR SERPL CREATININE-BSD FRML MDRD: >90 ML/MIN/{1.73_M2}
HCT VFR BLD AUTO: 41.8 % (ref 35–47)
HGB BLD-MCNC: 13.6 G/DL (ref 11.7–15.7)
IMM GRANULOCYTES # BLD: 0 10E9/L (ref 0–0.4)
IMM GRANULOCYTES NFR BLD: 0.6 %
LYMPHOCYTES # BLD AUTO: 1 10E9/L (ref 0.8–5.3)
LYMPHOCYTES NFR BLD AUTO: 20.4 %
MCH RBC QN AUTO: 33.3 PG (ref 26.5–33)
MCHC RBC AUTO-ENTMCNC: 32.5 G/DL (ref 31.5–36.5)
MCV RBC AUTO: 102 FL (ref 78–100)
MONOCYTES # BLD AUTO: 0.7 10E9/L (ref 0–1.3)
MONOCYTES NFR BLD AUTO: 13.5 %
NEUTROPHILS # BLD AUTO: 3.1 10E9/L (ref 1.6–8.3)
NEUTROPHILS NFR BLD AUTO: 61.4 %
NRBC # BLD AUTO: 0 10*3/UL
NRBC BLD AUTO-RTO: 0 /100
PLATELET # BLD AUTO: 267 10E9/L (ref 150–450)
PROT SERPL-MCNC: 7.4 G/DL (ref 6.8–8.8)
RBC # BLD AUTO: 4.09 10E12/L (ref 3.8–5.2)
WBC # BLD AUTO: 5.1 10E9/L (ref 4–11)

## 2019-07-22 RX ORDER — METHOTREXATE 2.5 MG/1
15 TABLET ORAL WEEKLY
Qty: 24 TABLET | Refills: 3 | Status: SHIPPED | OUTPATIENT
Start: 2019-07-22

## 2019-07-22 RX ORDER — BETAMETHASONE DIPROPIONATE 0.05 %
OINTMENT (GRAM) TOPICAL
Qty: 50 G | Refills: 3 | Status: SHIPPED | OUTPATIENT
Start: 2019-07-22

## 2019-07-22 RX ORDER — FOLIC ACID 1 MG/1
1 TABLET ORAL DAILY
Qty: 90 TABLET | Refills: 3 | Status: SHIPPED | OUTPATIENT
Start: 2019-07-22

## 2019-07-22 ASSESSMENT — PAIN SCALES - GENERAL: PAINLEVEL: NO PAIN (0)

## 2019-07-22 NOTE — LETTER
7/22/2019       RE: Kasey Ibrahim  [id number] [facility] [id number]     Dear Colleague,    Thank you for referring your patient, Kasey Ibrahim, to the [facility] DERMATOLOGY at [facility]. Please see a copy of my visit note below.    [facility] Dermatology Note      Dermatology Problem List:  1.Chronic hand and foot eczema 2/2 toxic irritant vs allergic vs atopic psoriaform  - On Methotrexate 15mg weekly. (Takes 5mg for a consecutive morning, evening, morning.)   - decrease to 12.5mg weekly dose if tolerated, Folic acid 1mg other 5 days of week   - Cumulative dose as of 4/22/19 ~420mg (started 11/2018)  - Betamethasone 0.05% ointment BID on Saturday and Sunday  - Tacrolimus 0.1% ointment Monday through Friday  -  20% Urea cream on feet    Encounter Date: Jul 22, 2019    CC:   Chief Complaint   Patient presents with     Allergies     Kasey is here for a follow up with improvement since her last visit.          History of Present Illness:  Ms. Kasey Ibrahim is a 69 year old female who presents with chronic hand and foot eczema for past 4.5 years.  She has tried and failed: nb uvb, MMF, acitretin, methotrexate (all prior to presenting at the ), minocycline ( 8/2016-3/20/17), prednisone, clobetasol 0.05% ointment, CSA 3mg/kg/day, and single dose of 600mg dupilumab (unable to continue dupilumab due to high copayment).  She had previous allergy patch test in 2013 where she was only found to have +1 to gold and bacitracin, in other words, negative patch test. (See [facility] records from [facility]).  Repeat patch testing 7/23/18 showed possible positive to Compositae, but was otherwise negative. No personal or family hx of psoriasis or hay fever.  She uses T gel for posterior scalp itching, which helps.     Interval Hx:  Patient was last seen 2/11/19 at which time her hands were significantly improved with her current regimen of methotrexate and  topical antiinflammatories. Her labs were checked at that visit and within normal limits. The plan at that time was to try reducing her methotrexate dose by 2.5mg per month. Since her last appointment she continues to do well. She has been taking methotrexate as prescribed 5 mg on Tuesday morning, Tuesday evening, and Wednesday morning for total dosing of 15 mg. Tried reducing to 2,2,1 for a total of 12.5mg but started to flare. Folic acid on other days of the week. With regard to topicals, she has been using betamethasone and tacrolimus ointment to the active areas on her hands and feet, alternating the two every other day.  She reports using urea cream on her feet primarily about twice a day and other lotions on the hands after handwashing approximately 3 times per day.  She has no new concerns today and is otherwise in her general state of health.    07/22/2019  Tried to reduce to 12.5mg Methotrexate in April and then had the feeling that cracks in skin were worse and went back to 15mg weekly    Past Medical History:   Patient Active Problem List   Diagnosis     Cholangitis     Past Medical History:   Diagnosis Date     Depression      Hand dermatitis      Past Surgical History:   Procedure Laterality Date     FOOT SURGERY       PHACOEMULSIFICATION WITH STANDARD INTRAOCULAR LENS IMPLANT Left 7/3/2017    Procedure: PHACOEMULSIFICATION WITH STANDARD INTRAOCULAR LENS IMPLANT;  Left cataract removal with implant;  Surgeon: Dominguez Payne MD;  Location: [facility] OR     PHACOEMULSIFICATION WITH STANDARD INTRAOCULAR LENS IMPLANT Right 7/24/2017    Procedure: PHACOEMULSIFICATION WITH STANDARD INTRAOCULAR LENS IMPLANT;  Right cataract removal with implant;  Surgeon: Dominguez Payne MD;  Location: [facility] OR       Social History:  The patient works for [employer] as a .     Family History:  No family hx of atopic dermatitis, hay fever, psoriasis  Brother- Scleroderm, Raynaud's.     Medications:  Current  Outpatient Medications   Medication Sig Dispense Refill     acitretin (SORIATANE) 25 MG CAPS capsule Take as directed (Patient not taking: Reported on 12/10/2018) 30 capsule 1     Alitretinoin 0.1 % GEL Externally apply topically 2 times daily (Patient not taking: Reported on 12/10/2018) 60 g 3     augmented betamethasone dipropionate (DIPROLENE-AF) 0.05 % ointment Apply to affected areas of hand twice daily, everyday. Use Vaniply on top of this medication. Occlude with gloves at night. (Patient not taking: Reported on 8/27/2018) 50 g 3     betamethasone dipropionate (DIPROSONE) 0.05 % external ointment Use on Saturday and Sunday. 50 g 3     betamethasone dipropionate (DIPROSONE) 0.05 % ointment Apply topically twice a week 50 g 3     BuPROPion HCl (WELLBUTRIN PO)        Calcium carb-Vitamin D 500 mg [place]-200 units (OSCAL WITH D;OYSTER SHELL CALCIUM) 500-200 MG-UNIT per tablet Take 1 tablet by mouth daily       clobetasol (TEMOVATE) 0.05 % ointment Apply thin layer twice daily to hands and feet.  Soak hands before application at bedtime. 60 g 11     Dupilumab (DUPIXENT) 300 MG/2ML syringe Inject 2 mLs (300 mg) Subcutaneous every 14 days 2 mL 4     FLUOXETINE HCL PO        folic acid (FOLVITE) 1 MG tablet Take 1 tablet (1 mg) by mouth daily On all days except days you take methotrexate 90 tablet 3     methocarbamol (ROBAXIN) 500 MG tablet Take 500 mg by mouth       methotrexate sodium 2.5 MG TABS Take 6 tablets (15 mg) by mouth once a week 24 tablet 3     methotrexate sodium 2.5 MG TABS Take 6 tablets (15 mg) by mouth once a week Take 2 tablets one morning,  2 tablets that evening. Then 2 tablets the following morning. 24 tablet 2     methylPREDNISolone (MEDROL DOSEPAK) 4 MG tablet Follow package instructions (Patient not taking: Reported on 8/27/2018) 21 tablet 0     naproxen (NAPROSYN) 500 MG tablet Take 500 mg by mouth       predniSONE (DELTASONE) 1 MG tablet Take 8 tablets (8 mg) by mouth daily (Patient not  taking: Reported on 8/27/2018) 240 tablet 3     predniSONE (DELTASONE) 10 MG tablet Take 1 tablet (10 mg) by mouth daily (Patient not taking: Reported on 8/27/2018) 7 tablet 0     predniSONE (DELTASONE) 10 MG tablet Take 40mg [id number] then [id number],, then 20mg qday until seen. (Patient not taking: Reported on 8/27/2018) 80 tablet 0     predniSONE (DELTASONE) 5 MG tablet 8,8,7,7,6,6,5,5,4,4,3,3,2,2,1,1, (Patient not taking: Reported on 8/27/2018) 72 tablet 0     predniSONE (DELTASONE) 5 MG tablet Take 1 tablet (5 mg) by mouth daily (Patient not taking: Reported on 8/27/2018) 30 tablet 3     tacrolimus (PROTOPIC) 0.1 % external ointment Apply topically daily Use Monday- Friday. (Patient not taking: Reported on 2/11/2019) 60 g 3     tacrolimus (PROTOPIC) 0.1 % ointment Apply topically 2 times daily (Patient not taking: Reported on 10/8/2018) 60 g 3     urea (GORMEL) 20 % external cream Apply daily to feet to help thin skin 480 g 3        Allergies   Allergen Reactions     Sulfadiazine      Patient declines this allergy. Other reaction(s): Hives/ Urticaria - Allergy     Tramadol      Patient declines this allergy. Other reaction(s): Shortness Of Breath/Wheezing/Chest Tightness - Allergy     Atorvastatin      Patient declines this allergy. Other reaction(s): Rash - Allergy     Bronopol Other (See Comments)     Previous Positive (+) skin patch test     Bacitracin Rash     Patch testing     Glutaraldehyde Rash     2+ patch testing     Gold Rash     Mild patch test reaction     Insulin Detemir      Patient declined this allergy. Other reaction(s): Headaches/ Migraines - Intolerance     Lisinopril      Patient declined this allergy. Other reaction(s): Headaches/ Migraines - Intolerance     Penicillins Rash     Sulfa Drugs Rash         Review of Systems:  -As per HPI  -Constitutional: The patient is otherwise in her normal state of health.   -Skin: As above in HPI. No additional skin concerns.    Physical  exam:  Vitals: There were no vitals taken for this visit.  GEN: This is a well developed, well-nourished female in no acute distress, in a pleasant mood.    SKIN: Focused examination of the palms and soles was performed.  - Mild Hyperkeratosis of heels of feet. No erythema. No fissures  - Hands appear smooth with some mildly erythematous thin plaques on fingertips with superficial fissuring of ~3 fingers.   - Overall stable since last visit.     Order for PATCH TESTS      [] Inpatient / [place]....   Bed ....  [x] Outpatient    Skin Atopy           :       [x] Yes   [] No  Rhinitis/Sinusitis :       [x] Yes   [] No  Allergic Asthma   :       [] Yes   [] No  Leg ulcers            :       [] Yes   [x] No  Hand eczema       :       [x] Yes   [] No    Leading hand :  [] R [] L               [] Ambidextrous                      Reason for tests (Suspected allergy): contact allergy on hands/feet or Psoriasis  Known previous allergies: ...    [] Standardized panels  [x] Standard panel (40 tests)  [x] Preservatives & Antimicrobials (31 tests)  [x] Emulsifiers & Additives (25 tests)   [] Perfumes/Flavours & Plants (25 tests)  [] Hairdresser panel (12 tests)  [] Rubber Chemicals (22 tests)  [] Plastics (26 tests)  [x] Colorants/Dyes/Food additives (20 tests)  [] Metals (implants/dental) (23 tests)  [] Local anaesthetics/NSAIDs (12 tests)  [] Antibiotics & Antimycotics (14 tests)   [x] Corticosteroids (15 tests)   [] Photopatch test (32 tests)   [] others: ...    [] Patient's own products: ...    DO NOT test if chemical or biological identity is unknown!     always ask from patient the product information and safety sheets (MSDS)     [] Patient needs consultation with Allergy team  [x] Tests discussed with Allergy team (can have direct appointment for patch tests)    ==> put on back the patch tests on 07/23/2018 (added Colorants and dyes series --> patient thinks that dyes in money might play a role)  --> 1st readings on  07/25/2018: all tests were well sticking to the end and no skin reaction in any of the test fields. However, some itching over the corticosteroid series  --> 2nd readings on 07/27/2018: compositae +-++    RESULTS & EVALUATION of PATCH TESTS      Patch test readings after     [x] 2 days, [] 3 days [x] 4 days, [] 5 days,    [x] Allergic reaction diagnosed against: +-++ reaction to Compositae mix      Interpretation/ remarks:   Basically, all patch tests negatives. Some reaction to the compositae mix, but not sure how re levant this is. Patient cannot make a connection and does not recall that she has regular contact with plant extract.   However, patient has a generalised dry skin and this could indicate for an atopic dermatitis without relevant contact dermatitis. This might be an indication for treatment with Dupilumab.     [x] Patient information given   [x] [employer] information    [x] Treatment prescribed: maybe try again treatment with Dupilumab. PUVA treatment or radiotherapy would be a possibility as well. Appeal to insurance for Dupilumab treatment.   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 Insurance doesn't have lots of co-payment. Would be more than $1600.  - Future consideration would be Acitretin - can obtain from [facility] in [place] (paper script to pt who faxes it in)  - Taking  Methotrexate 15mg weekly. (Continue 5mg on a consecutive morning, evening, and morning.)   - Continue Folic acid 1mg other 5 days of week   - Can try to reduce to 12.5mg as able  - Continue alternating betamethasone and tacrolimus to fissured areas, can use under rubber thimbles as needed for fissuring  - Continue using Urea cream 20% for feet  - Use vanicream after handwashing and multiple times a day  -  Again 4 month of refills for methotrexate and folic acid provided   - Cumulative dose today ~650 mg (started 11/2018)    Follow up in 4 months with labs at that appt.      Resident (Karlos Vega MD) / Staff (as above)    Karlos Vega MD  Dermatology Resident, PGY3    Staff Physician Comments:  I saw and evaluated the patient with the resident and I agree with the assessment and plan as documented in the resident's note.    Lester Aranda MD  Professor  Head of Dermato-Allergy Division  Department of Dermatology  [facility], [facility]  I spent a total of 15 min face to face with Kasey Ibrahim during today's office visit. About 50% of the time was spent counseling the patient and/or coordinating care regarding their allergy.

## 2019-07-22 NOTE — NURSING NOTE
Allergy Rooming Note    Kasey Ibrahim's goals for this visit include:   Chief Complaint   Patient presents with     Allergies     Kasey is here for a follow up with improvement since her last visit.      Regine Yancey LPN

## 2019-07-22 NOTE — PROGRESS NOTES
[facility] Dermatology Note      Dermatology Problem List:  1.Chronic hand and foot eczema 2/2 toxic irritant vs allergic vs atopic psoriaform  - On Methotrexate 15mg weekly. (Takes 5mg for a consecutive morning, evening, morning.)   - decrease to 12.5mg weekly dose if tolerated, Folic acid 1mg other 5 days of week   - Cumulative dose as of 4/22/19 ~420mg (started 11/2018)  - Betamethasone 0.05% ointment BID on Saturday and Sunday  - Tacrolimus 0.1% ointment Monday through Friday  -  20% Urea cream on feet    Encounter Date: Jul 22, 2019    CC:   Chief Complaint   Patient presents with     Allergies     Kasey is here for a follow up with improvement since her last visit.          History of Present Illness:  Ms. Kasey Ibrahim is a 69 year old female who presents with chronic hand and foot eczema for past 4.5 years.  She has tried and failed: nb uvb, MMF, acitretin, methotrexate (all prior to presenting at the ), minocycline ( 8/2016-3/20/17), prednisone, clobetasol 0.05% ointment, CSA 3mg/kg/day, and single dose of 600mg dupilumab (unable to continue dupilumab due to high copayment).  She had previous allergy patch test in 2013 where she was only found to have +1 to gold and bacitracin, in other words, negative patch test. ([facility]  Repeat patch testing 7/23/18 showed possible positive to Compositae, but was otherwise negative. No personal or family hx of psoriasis or hay fever.  She uses T gel for posterior scalp itching, which helps.     Interval Hx:  Patient was last seen 2/11/19 at which time her hands were significantly improved with her current regimen of methotrexate and topical antiinflammatories. Her labs were checked at that visit and within normal limits. The plan at that time was to try reducing her methotrexate dose by 2.5mg per month. Since her last appointment she continues to do well. She has been taking methotrexate as prescribed 5 mg on Tuesday  morning, Tuesday evening, and Wednesday morning for total dosing of 15 mg. Tried reducing to 2,2,1 for a total of 12.5mg but started to flare. Folic acid on other days of the week. With regard to topicals, she has been using betamethasone and tacrolimus ointment to the active areas on her hands and feet, alternating the two every other day.  She reports using urea cream on her feet primarily about twice a day and other lotions on the hands after handwashing approximately 3 times per day.  She has no new concerns today and is otherwise in her general state of health.    07/22/2019  Tried to reduce to 12.5mg Methotrexate in April and then had the feeling that cracks in skin were worse and went back to 15mg weekly    Past Medical History:   Patient Active Problem List   Diagnosis     Cholangitis     Past Medical History:   Diagnosis Date     Depression      Hand dermatitis      Past Surgical History:   Procedure Laterality Date     FOOT SURGERY       PHACOEMULSIFICATION WITH STANDARD INTRAOCULAR LENS IMPLANT Left 7/3/2017    Procedure: PHACOEMULSIFICATION WITH STANDARD INTRAOCULAR LENS IMPLANT;  Left cataract removal with implant;  Surgeon: Dominguez Payne MD;  Location: [facility] OR     PHACOEMULSIFICATION WITH STANDARD INTRAOCULAR LENS IMPLANT Right 7/24/2017    Procedure: PHACOEMULSIFICATION WITH STANDARD INTRAOCULAR LENS IMPLANT;  Right cataract removal with implant;  Surgeon: Dominguez Payne MD;  Location: [facility] OR       Social History:  The patient works for [employer] as a .     Family History:  No family hx of atopic dermatitis, hay fever, psoriasis  Brother- Scleroderm, Raynaud's.     Medications:  Current Outpatient Medications   Medication Sig Dispense Refill     acitretin (SORIATANE) 25 MG CAPS capsule Take as directed (Patient not taking: Reported on 12/10/2018) 30 capsule 1     Alitretinoin 0.1 % GEL Externally apply topically 2 times daily (Patient not taking: Reported on 12/10/2018) 60 g 3      augmented betamethasone dipropionate (DIPROLENE-AF) 0.05 % ointment Apply to affected areas of hand twice daily, everyday. Use Vaniply on top of this medication. Occlude with gloves at night. (Patient not taking: Reported on 8/27/2018) 50 g 3     betamethasone dipropionate (DIPROSONE) 0.05 % external ointment Use on Saturday and Sunday. 50 g 3     betamethasone dipropionate (DIPROSONE) 0.05 % ointment Apply topically twice a week 50 g 3     BuPROPion HCl (WELLBUTRIN PO)        Calcium carb-Vitamin D 500 mg [place]-200 units (OSCAL WITH D;OYSTER SHELL CALCIUM) 500-200 MG-UNIT per tablet Take 1 tablet by mouth daily       clobetasol (TEMOVATE) 0.05 % ointment Apply thin layer twice daily to hands and feet.  Soak hands before application at bedtime. 60 g 11     Dupilumab (DUPIXENT) 300 MG/2ML syringe Inject 2 mLs (300 mg) Subcutaneous every 14 days 2 mL 4     FLUOXETINE HCL PO        folic acid (FOLVITE) 1 MG tablet Take 1 tablet (1 mg) by mouth daily On all days except days you take methotrexate 90 tablet 3     methocarbamol (ROBAXIN) 500 MG tablet Take 500 mg by mouth       methotrexate sodium 2.5 MG TABS Take 6 tablets (15 mg) by mouth once a week 24 tablet 3     methotrexate sodium 2.5 MG TABS Take 6 tablets (15 mg) by mouth once a week Take 2 tablets one morning,  2 tablets that evening. Then 2 tablets the following morning. 24 tablet 2     methylPREDNISolone (MEDROL DOSEPAK) 4 MG tablet Follow package instructions (Patient not taking: Reported on 8/27/2018) 21 tablet 0     naproxen (NAPROSYN) 500 MG tablet Take 500 mg by mouth       predniSONE (DELTASONE) 1 MG tablet Take 8 tablets (8 mg) by mouth daily (Patient not taking: Reported on 8/27/2018) 240 tablet 3     predniSONE (DELTASONE) 10 MG tablet Take 1 tablet (10 mg) by mouth daily (Patient not taking: Reported on 8/27/2018) 7 tablet 0     predniSONE (DELTASONE) 10 MG tablet Take 40mg [id number] then [id number],, then 20mg qday until seen. (Patient not  taking: Reported on 8/27/2018) 80 tablet 0     predniSONE (DELTASONE) 5 MG tablet 8,8,7,7,6,6,5,5,4,4,3,3,2,2,1,1, (Patient not taking: Reported on 8/27/2018) 72 tablet 0     predniSONE (DELTASONE) 5 MG tablet Take 1 tablet (5 mg) by mouth daily (Patient not taking: Reported on 8/27/2018) 30 tablet 3     tacrolimus (PROTOPIC) 0.1 % external ointment Apply topically daily Use Monday- Friday. (Patient not taking: Reported on 2/11/2019) 60 g 3     tacrolimus (PROTOPIC) 0.1 % ointment Apply topically 2 times daily (Patient not taking: Reported on 10/8/2018) 60 g 3     urea (GORMEL) 20 % external cream Apply daily to feet to help thin skin 480 g 3        Allergies   Allergen Reactions     Sulfadiazine      Patient declines this allergy. Other reaction(s): Hives/ Urticaria - Allergy     Tramadol      Patient declines this allergy. Other reaction(s): Shortness Of Breath/Wheezing/Chest Tightness - Allergy     Atorvastatin      Patient declines this allergy. Other reaction(s): Rash - Allergy     Bronopol Other (See Comments)     Previous Positive (+) skin patch test     Bacitracin Rash     Patch testing     Glutaraldehyde Rash     2+ patch testing     Gold Rash     Mild patch test reaction     Insulin Detemir      Patient declined this allergy. Other reaction(s): Headaches/ Migraines - Intolerance     Lisinopril      Patient declined this allergy. Other reaction(s): Headaches/ Migraines - Intolerance     Penicillins Rash     Sulfa Drugs Rash         Review of Systems:  -As per HPI  -Constitutional: The patient is otherwise in her normal state of health.   -Skin: As above in HPI. No additional skin concerns.    Physical exam:  Vitals: There were no vitals taken for this visit.  GEN: This is a well developed, well-nourished female in no acute distress, in a pleasant mood.    SKIN: Focused examination of the palms and soles was performed.  - Mild Hyperkeratosis of heels of feet. No erythema. No fissures  - Hands appear smooth  with some mildly erythematous thin plaques on fingertips with superficial fissuring of ~3 fingers.   - Overall stable since last visit.     Order for PATCH TESTS      [] Inpatient / [place]....   Bed ....  [x] Outpatient    Skin Atopy           :       [x] Yes   [] No  Rhinitis/Sinusitis :       [x] Yes   [] No  Allergic Asthma   :       [] Yes   [] No  Leg ulcers            :       [] Yes   [x] No  Hand eczema       :       [x] Yes   [] No    Leading hand :  [] R [] L               [] Ambidextrous                      Reason for tests (Suspected allergy): contact allergy on hands/feet or Psoriasis  Known previous allergies: ...    [] Standardized panels  [x] Standard panel (40 tests)  [x] Preservatives & Antimicrobials (31 tests)  [x] Emulsifiers & Additives (25 tests)   [] Perfumes/Flavours & Plants (25 tests)  [] Hairdresser panel (12 tests)  [] Rubber Chemicals (22 tests)  [] Plastics (26 tests)  [x] Colorants/Dyes/Food additives (20 tests)  [] Metals (implants/dental) (23 tests)  [] Local anaesthetics/NSAIDs (12 tests)  [] Antibiotics & Antimycotics (14 tests)   [x] Corticosteroids (15 tests)   [] Photopatch test (32 tests)   [] others: ...    [] Patient's own products: ...    DO NOT test if chemical or biological identity is unknown!     always ask from patient the product information and safety sheets (MSDS)     [] Patient needs consultation with Allergy team  [x] Tests discussed with Allergy team (can have direct appointment for patch tests)    ==> put on back the patch tests on 07/23/2018 (added Colorants and dyes series --> patient thinks that dyes in money might play a role)  --> 1st readings on 07/25/2018: all tests were well sticking to the end and no skin reaction in any of the test fields. However, some itching over the corticosteroid series  --> 2nd readings on 07/27/2018: compositae +-++    RESULTS & EVALUATION of PATCH TESTS      Patch test readings after     [x] 2 days, [] 3 days [x] 4 days, [] 5  days,    [x] Allergic reaction diagnosed against: +-++ reaction to Compositae mix      Interpretation/ remarks:   Basically, all patch tests negatives. Some reaction to the compositae mix, but not sure how re levant this is. Patient cannot make a connection and does not recall that she has regular contact with plant extract.   However, patient has a generalised dry skin and this could indicate for an atopic dermatitis without relevant contact dermatitis. This might be an indication for treatment with Dupilumab.     [x] Patient information given   [x] SmartPractice information    [x] Treatment prescribed: maybe try again treatment with Dupilumab. PUVA treatment or radiotherapy would be a possibility as well. Appeal to insurance for Dupilumab treatment.   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 Insurance doesn't have lots of co-payment. Would be more than $1600.  - Future consideration would be Acitretin - can obtain from [facility] in [place] (paper script to pt who faxes it in)  - Taking Methotrexate 15mg weekly. (Continue 5mg on a consecutive morning, evening, and morning.)   - Continue Folic acid 1mg other 5 days of week   - Can try to reduce to 12.5mg as able  - Continue alternating betamethasone and tacrolimus to fissured areas, can use under rubber thimbles as needed for fissuring  - Continue using  Urea cream 20% for feet  - Use vanicream after handwashing and multiple times a day  -  Again 4 month of refills for methotrexate and folic acid provided   - Cumulative dose today ~650 mg (started 11/2018)    Follow up in 4 months with labs at that appt.      Resident (Karlos Vega MD) / Staff (as above)    Karlos Vega MD  Dermatology Resident, PGY3    Staff Physician Comments:  I saw and evaluated the patient with the resident and I agree with the assessment and plan as documented in the resident's note.    Lester Aranda MD  Professor  Head of Dermato-Allergy Division  Department of Dermatology  [facility]  I spent a total of 15 min face to face with Kasey Ibrahim during today's office visit. About 50% of the time was spent counseling the patient and/or coordinating care regarding their allergy.

## 2019-08-07 ENCOUNTER — TELEPHONE (OUTPATIENT)
Dept: ALLERGY | Facility: CLINIC | Age: 69
End: 2019-08-07

## 2019-09-30 ENCOUNTER — TELEPHONE (OUTPATIENT)
Dept: DERMATOLOGY | Facility: CLINIC | Age: 69
End: 2019-09-30

## 2019-09-30 NOTE — TELEPHONE ENCOUNTER
[facility] Call Center    Phone Message    May a detailed message be left on voicemail: yes    Reason for Call: Other: .  pt is asking if you will still see pt for dermatitis on her hands.  She was not made aware you left derm and started working in allergy. If unable to see pt for this condition please refer pt to a new derm provider per pt request.    Action Taken: Message routed to:  [facility] allergy

## 2019-11-02 ENCOUNTER — HEALTH MAINTENANCE LETTER (OUTPATIENT)
Age: 69
End: 2019-11-02

## 2019-12-16 DIAGNOSIS — L30.9 CHRONIC ECZEMA OF HAND: ICD-10-CM

## 2019-12-16 NOTE — TELEPHONE ENCOUNTER
[facility] Call Center    Phone Message    May a detailed message be left on voicemail: yes    Reason for Call: Medication Refill Request    Has the patient contacted the pharmacy for the refill? Yes   Name of medication being requested: methotrexate sodium 2.5 MG TABS  Provider who prescribed the medication: Dr. Aranda  Pharmacy: [facility] [address] [place] [address] - [address]  Date medication is needed: 12/16         Action Taken: Other: Allergy

## 2019-12-17 RX ORDER — METHOTREXATE 2.5 MG/1
15 TABLET ORAL WEEKLY
Qty: 24 TABLET | Refills: 2 | Status: SHIPPED | OUTPATIENT
Start: 2019-12-17 | End: 2020-04-03

## 2020-02-10 ENCOUNTER — HEALTH MAINTENANCE LETTER (OUTPATIENT)
Age: 70
End: 2020-02-10

## 2020-04-01 DIAGNOSIS — L30.9 CHRONIC ECZEMA OF HAND: ICD-10-CM

## 2020-04-03 RX ORDER — METHOTREXATE 2.5 MG/1
TABLET ORAL
Qty: 24 TABLET | Refills: 1 | Status: SHIPPED | OUTPATIENT
Start: 2020-04-03 | End: 2020-08-17

## 2020-08-04 DIAGNOSIS — L30.9 CHRONIC ECZEMA OF HAND: ICD-10-CM

## 2020-08-04 RX ORDER — METHOTREXATE 2.5 MG/1
TABLET ORAL
Qty: 24 TABLET | Refills: 0 | OUTPATIENT
Start: 2020-08-04

## 2020-08-04 NOTE — TELEPHONE ENCOUNTER
Last appointment over one year ago. Patient needs appointment. Refill request refused. Notified patient.

## 2020-08-17 DIAGNOSIS — L30.9 CHRONIC ECZEMA OF HAND: ICD-10-CM

## 2020-08-17 RX ORDER — METHOTREXATE 2.5 MG/1
TABLET ORAL
Qty: 30 TABLET | Refills: 4 | Status: SHIPPED | OUTPATIENT
Start: 2020-08-17

## 2020-08-17 NOTE — TELEPHONE ENCOUNTER
Received refill request for methotrexate. Patient chart reviewed. Refill accepted. Rx routed to Dr. Rosi Rodrigez MD  Dermatology Resident  [facility]

## 2020-08-17 NOTE — TELEPHONE ENCOUNTER
Methotrexate      Last Written Prescription Date:  4-3-2020  Last Fill Quantity: 24,   # refills: 1  Last Office Visit: 7-22-19  Future Office visit:  none    CBC RESULTS:   Recent Labs   Lab Test 07/22/19  1140   WBC 5.1   RBC 4.09   HGB 13.6   HCT 41.8   *   MCH 33.3*   MCHC 32.5   RDW 14.0          Creatinine   Date Value Ref Range Status   07/22/2019 0.61 0.52 - 1.04 mg/dL Final   ]    Liver Function Studies -   Recent Labs   Lab Test 07/22/19  1140   PROTTOTAL 7.4   ALBUMIN 3.9   BILITOTAL 0.4   ALKPHOS 58   AST 17   ALT 25         Routing refill request to provider for review/approval because:  Has not been seen in Dermatology in over a year, has not see Dr Aranda in dermatology or allergy in over a year (where is she to be following up), labs are over due and possible gap in treatment.          Kathleen M Doege RN

## 2020-08-17 NOTE — TELEPHONE ENCOUNTER
[facility] Call Center    Phone Message    May a detailed message be left on voicemail: yes     Reason for Call: Medication Refill Request    Has the patient contacted the pharmacy for the refill? Yes   Name of medication being requested: Methotrexate  Provider who prescribed the medication: Dr. Aranda  Pharmacy: [facility] [address] [place] [address] [address]   Date medication is needed: pt stated that she has been out for about 2 weeks          Action Taken: Message routed to:  [facility] Derm    Travel Screening: Not Applicable

## 2020-09-16 ENCOUNTER — NURSE TRIAGE (OUTPATIENT)
Dept: NURSING | Facility: CLINIC | Age: 70
End: 2020-09-16

## 2020-09-17 NOTE — TELEPHONE ENCOUNTER
Caller is experiencing urinary frequency and pressure since late after noon; no fever or dysuria; no excessive thirst aor other problems   triage protocol reviewed   Advised to be seen within 24 hrs   Caller understands and will comply   Katya Waddell RN  FNA       Additional Information    Negative: Shock suspected (e.g., cold/pale/clammy skin, too weak to stand, low BP, rapid pulse)    Negative: Sounds like a life-threatening emergency to the triager    Negative: Followed a genital area injury    Negative: Followed a genital area injury (penis, scrotum)    Negative: Vaginal discharge    Negative: Pus (white, yellow) or bloody discharge from end of penis    Negative: [1] Taking antibiotic for urinary tract infection (UTI) AND [2] female    Negative: [1] Taking antibiotic for urinary tract infection (UTI) AND [2] male    Negative: [1] Discomfort (pain, burning or stinging) when passing urine AND [2] pregnant    Negative: [1] Discomfort (pain, burning or stinging) when passing urine AND [2] postpartum (from 0 to 6 weeks after delivery)    Negative: [1] Discomfort (pain, burning or stinging) when passing urine AND [2] female    Negative: [1] Discomfort (pain, burning or stinging) when passing urine AND [2] male    Negative: Pain or itching in the vulvar area    Negative: Pain in scrotum is main symptom    Negative: Blood in the urine is main symptom    Negative: Symptoms arising from use of a urinary catheter (Tolentino or Coude)    Negative: [1] Unable to urinate (or only a few drops) > 4 hours AND     [2] bladder feels very full (e.g., palpable bladder or strong urge to urinate)    Negative: [1] Decreased urination and [2] drinking very little AND [2] dehydration suspected (e.g., dark urine, no urine > 12 hours, very dry mouth, very lightheaded)    Negative: Patient sounds very sick or weak to the triager    Negative: Fever > 100.5 F (38.1 C)    Urinating more frequently than usual (i.e., frequency)    Protocols used:  URINARY SYMPTOMS-A-AH

## 2020-11-16 ENCOUNTER — HEALTH MAINTENANCE LETTER (OUTPATIENT)
Age: 70
End: 2020-11-16

## 2020-12-21 ENCOUNTER — HOSPITAL ENCOUNTER (OUTPATIENT)
Dept: OUTPATIENT PROCEDURES | Facility: CLINIC | Age: 70
Discharge: HOME | End: 2020-12-21
Attending: OPHTHALMOLOGY | Admitting: OPHTHALMOLOGY
Payer: MEDICARE

## 2020-12-21 PROCEDURE — 66821 AFTER CATARACT LASER SURGERY: CPT | Mod: LT | Performed by: OPHTHALMOLOGY

## 2021-04-03 ENCOUNTER — HEALTH MAINTENANCE LETTER (OUTPATIENT)
Age: 71
End: 2021-04-03

## 2021-05-31 ENCOUNTER — RECORDS - HEALTHEAST (OUTPATIENT)
Dept: ADMINISTRATIVE | Facility: CLINIC | Age: 71
End: 2021-05-31

## 2021-06-09 ENCOUNTER — RECORDS - HEALTHEAST (OUTPATIENT)
Dept: ADMINISTRATIVE | Facility: CLINIC | Age: 71
End: 2021-06-09

## 2021-06-30 NOTE — H&P
[facility]  [facility]  [address]  [facility] [address]  [phone]  Dept: [phone]    OPHTHALMOLOGY PRE-OPERATIVE  HISTORY AND PHYSICAL    DATE OF H/P:  2017    DATE OF SURGERY:  2017  PROCEDURE:  Procedure(s):  PHACOEMULSIFICATION WITH STANDARD INTRAOCULAR LENS IMPLANT, Right Eye  LENS IMPLANT:  [id number] +18.0  REFRACTIVE GOAL:  PL Sph  SURGEON:  Dominguez Payne MD    ANESTHESIA:  TOPICAL / MAC    OR CASE REQUIREMENTS:    DEMOGRAPHICS:  Demographic Information on Kasey Ibrahim:    Kasey Ibrahim  Gender: female  : 1950  [address]  [facility] [id number] [phone] (home)     Medical Record: [id number]  Social Security Number: [id number]  Pharmacy:    [facility] [address] [place], [place] - [address]  Primary Care Provider: Ankit Martini    Parent's names are: Data Unavailable (mother) and Data Unavailable (father).    Insurance: Payor: LUMI Mask / Plan: LUMI Mask PLATINUM BLUE / Product Type: PPO /     OCULAR HISTORY:  Cataracts, s/p IOL OS.    HISTORIES:  Past Medical History:   Diagnosis Date     Depression      Hand dermatitis        Past Surgical History:   Procedure Laterality Date     FOOT SURGERY       PHACOEMULSIFICATION WITH STANDARD INTRAOCULAR LENS IMPLANT Left 7/3/2017    Procedure: PHACOEMULSIFICATION WITH STANDARD INTRAOCULAR LENS IMPLANT;  Left cataract removal with implant;  Surgeon: Dominguez Payne MD;  Location: [facility] OR       Family History   Problem Relation Age of Onset     Melanoma No family hx of      Skin Cancer No family hx of        Social History   Substance Use Topics     Smoking status: Former Smoker     Smokeless tobacco: Not on file     Alcohol use Not on file       MEDICATIONS:  No current facility-administered medications for this encounter.      Current Outpatient Prescriptions   Medication Sig     naproxen (NAPROSYN) 500 MG tablet Take 500  mg by mouth     augmented betamethasone dipropionate (DIPROLENE-AF) 0.05 % ointment Apply to affected areas of hand twice daily, everyday. Use Vaniply on top of this medication. Occlude with gloves at night.     clobetasol (TEMOVATE) 0.05 % ointment Apply thin layer twice daily to hands and feet.  Soak hands before application at bedtime.     FLUOXETINE HCL PO      BuPROPion HCl (WELLBUTRIN PO)        ALLERGIES:     Allergies   Allergen Reactions     Sulfadiazine      Other reaction(s): Hives/ Urticaria - Allergy     Tramadol      Other reaction(s): Shortness Of Breath/Wheezing/Chest Tightness - Allergy     Atorvastatin      Other reaction(s): Rash - Allergy     Bronopol Other (See Comments)     Previous Positive (+) skin patch test     Bacitracin Rash     Patch testing     Glutaraldehyde Rash     2+ patch testing     Gold Rash     Mild patch test reaction     Insulin Detemir      Other reaction(s): Headaches/ Migraines - Intolerance     Lisinopril      Other reaction(s): Headaches/ Migraines - Intolerance     Penicillins Rash     Sulfa Drugs Rash       PERTINENT SYSTEMS REVIEW:    1. No - Do you have a history of heart attack, stroke, stent, bypass or surgery on an artery in the head, neck, heart or legs?  2. No - Do you ever have any pain or discomfort in your chest?  3. No - Do you have a history of  Heart Failure?  4. No - Are you troubled by shortness of breath when walking: On the level, up a slight hill or at night?  5. No - Do you currently have a cold, bronchitis or other respiratory infection?  6. No - Do you have a cough, shortness of breath or wheezing?  7. No - Do you sometimes get pains in the calves of your legs when you walk?  8. No - Do you or anyone in your family have previous history of blood clots?  9. No - Do you or does anyone in your family have a serious bleeding problem such as prolonged bleeding following surgeries or cuts?  10. No - Have you ever had problems with anemia or been told to  take iron pills?  11. No - Have you had any abnormal blood loss such as black, tarry or bloody stools, or abnormal vaginal bleeding?  12. Yes: after childbirth, 1974 - Have you ever had a blood transfusion?  13. No - Have you or any of your relatives ever had problems with anesthesia?  14. No - Do you have sleep apnea, excessive snoring or daytime drowsiness?  15. No - Do you have any prosthetic heart valves?  16. No - Do you have prosthetic joints?    EXAMINATION:  Vitals were reviewed                     Vison:  Va, right - 20/30;   BAT, right - 20/70  HEENT:  Cataract, otherwise unremarkable.  LUNGS:  Clear  CV:  Regular rate and rhythm without murmur  ABD:  Soft and nontender  NEURO:  Alert and nonfocal    IMPRESSION:  Patient cleared for ophthalmic surgery.  Low risk with monitored, light sedation.  I have assessed the patient's DVT risk, and no additional orders necessary.    PLAN:  Procedure(s):  PHACOEMULSIFICATION WITH STANDARD INTRAOCULAR LENS IMPLANT, Right Eye      Dominguez Payne MD   Suturegard Body: The suture ends were repeatedly re-tightened and re-clamped to achieve the desired tissue expansion.

## 2021-09-12 ENCOUNTER — HEALTH MAINTENANCE LETTER (OUTPATIENT)
Age: 71
End: 2021-09-12

## 2022-04-24 ENCOUNTER — HEALTH MAINTENANCE LETTER (OUTPATIENT)
Age: 72
End: 2022-04-24

## 2022-11-19 ENCOUNTER — HEALTH MAINTENANCE LETTER (OUTPATIENT)
Age: 72
End: 2022-11-19

## 2023-11-19 ENCOUNTER — HEALTH MAINTENANCE LETTER (OUTPATIENT)
Age: 73
End: 2023-11-19

## 2025-07-27 ENCOUNTER — HOSPITAL ENCOUNTER (EMERGENCY)
Facility: CLINIC | Age: 75
Discharge: HOME | End: 2025-07-27
Payer: COMMERCIAL

## 2025-07-27 VITALS
TEMPERATURE: 97.6 F | DIASTOLIC BLOOD PRESSURE: 68 MMHG | OXYGEN SATURATION: 97 % | HEART RATE: 87 BPM | RESPIRATION RATE: 17 BRPM | SYSTOLIC BLOOD PRESSURE: 120 MMHG

## 2025-07-27 DIAGNOSIS — M54.42 ACUTE BILATERAL LOW BACK PAIN WITH LEFT-SIDED SCIATICA: Primary | ICD-10-CM

## 2025-07-27 PROCEDURE — G0463 HOSPITAL OUTPT CLINIC VISIT: HCPCS

## 2025-07-27 PROCEDURE — 99203 OFFICE O/P NEW LOW 30 MIN: CPT

## 2025-07-27 RX ORDER — LIDOCAINE 50 MG/G
1 PATCH TOPICAL EVERY 24 HOURS
Qty: 5 PATCH | Refills: 0 | Status: SHIPPED | OUTPATIENT
Start: 2025-07-27 | End: 2025-08-01

## 2025-07-27 RX ORDER — PREDNISONE 20 MG/1
TABLET ORAL
Qty: 10 TABLET | Refills: 0 | Status: SHIPPED | OUTPATIENT
Start: 2025-07-27

## 2025-07-27 RX ORDER — CYCLOBENZAPRINE HCL 10 MG
10 TABLET ORAL 3 TIMES DAILY PRN
Qty: 21 TABLET | Refills: 0 | Status: SHIPPED | OUTPATIENT
Start: 2025-07-27 | End: 2025-08-03

## 2025-07-27 ASSESSMENT — COLUMBIA-SUICIDE SEVERITY RATING SCALE - C-SSRS
2. HAVE YOU ACTUALLY HAD ANY THOUGHTS OF KILLING YOURSELF IN THE PAST MONTH?: NO
6. HAVE YOU EVER DONE ANYTHING, STARTED TO DO ANYTHING, OR PREPARED TO DO ANYTHING TO END YOUR LIFE?: NO
1. IN THE PAST MONTH, HAVE YOU WISHED YOU WERE DEAD OR WISHED YOU COULD GO TO SLEEP AND NOT WAKE UP?: NO

## 2025-07-27 NOTE — ED PROVIDER NOTES
History     Chief Complaint   Patient presents with    Back Pain     HPI  Kasey Ibrahim is a 75 year old female who presents to  urgent care with chief complaint of sciatica.  Patient does have history of sciatica.  She reports that she had arthritis flare of her left knee a few weeks ago.  She then developed severe sciatic pain over the last few days.  She reports sharp shooting pains into her posterior leg and in her mid back.  She has been taking ibuprofen for her pain which has not been helpful.  Pain is worse with sitting and standing up.  She does have physical therapy appointments coming up.  Seizure, incontinence of bowel or bladder.    Allergies:  Allergies   Allergen Reactions    Sulfadiazine      Patient declines this allergy. Other reaction(s): Hives/ Urticaria - Allergy    Tramadol      Patient declines this allergy. Other reaction(s): Shortness Of Breath/Wheezing/Chest Tightness - Allergy    Atorvastatin      Patient declines this allergy. Other reaction(s): Rash - Allergy    Bronopol Other (See Comments)     Previous Positive (+) skin patch test    Bacitracin Rash     Patch testing    Glutaral Rash     2+ patch testing    Gold Rash     Mild patch test reaction    Insulin Detemir      Patient declined this allergy. Other reaction(s): Headaches/ Migraines - Intolerance    Lisinopril      Patient declined this allergy. Other reaction(s): Headaches/ Migraines - Intolerance    Penicillins Rash    Sulfa Antibiotics Rash       Problem List:    Patient Active Problem List    Diagnosis Date Noted    Cholangitis (H) 06/23/2017     Priority: Medium        Past Medical History:    Past Medical History:   Diagnosis Date    Depression     Hand dermatitis        Past Surgical History:    Past Surgical History:   Procedure Laterality Date    FOOT SURGERY      PHACOEMULSIFICATION WITH STANDARD INTRAOCULAR LENS IMPLANT Left 7/3/2017    Procedure: PHACOEMULSIFICATION WITH STANDARD INTRAOCULAR LENS IMPLANT;  Left  cataract removal with implant;  Surgeon: Dominguez Payne MD;  Location: [facility]    PHACOEMULSIFICATION WITH STANDARD INTRAOCULAR LENS IMPLANT Right 7/24/2017    Procedure: PHACOEMULSIFICATION WITH STANDARD INTRAOCULAR LENS IMPLANT;  Right cataract removal with implant;  Surgeon: Dominguez Payne MD;  Location: [facility] OR       Family History:    Family History   Problem Relation Age of Onset    Melanoma No family hx of     Skin Cancer No family hx of        Social History:  Marital Status:   [2]  Social History     Tobacco Use    Smoking status: Former    Smokeless tobacco: Never        Medications:    cyclobenzaprine (FLEXERIL) 10 MG tablet  lidocaine (LIDODERM) 5 % patch  predniSONE (DELTASONE) 20 MG tablet  acitretin (SORIATANE) 25 MG CAPS capsule  Alitretinoin 0.1 % GEL  augmented betamethasone dipropionate (DIPROLENE-AF) 0.05 % ointment  betamethasone dipropionate (DIPROSONE) 0.05 % external ointment  betamethasone dipropionate (DIPROSONE) 0.05 % ointment  BuPROPion HCl (WELLBUTRIN PO)  Calcium carb-Vitamin D 500 mg [place]-200 units (OSCAL WITH D;OYSTER SHELL CALCIUM) 500-200 MG-UNIT per tablet  clobetasol (TEMOVATE) 0.05 % ointment  Dupilumab (DUPIXENT) 300 MG/2ML syringe  FLUOXETINE HCL PO  folic acid (FOLVITE) 1 MG tablet  methocarbamol (ROBAXIN) 500 MG tablet  methotrexate sodium 2.5 MG TABS  methotrexate sodium 2.5 MG TABS  methylPREDNISolone (MEDROL DOSEPAK) 4 MG tablet  naproxen (NAPROSYN) 500 MG tablet  predniSONE (DELTASONE) 1 MG tablet  predniSONE (DELTASONE) 10 MG tablet  predniSONE (DELTASONE) 10 MG tablet  predniSONE (DELTASONE) 5 MG tablet  predniSONE (DELTASONE) 5 MG tablet  tacrolimus (PROTOPIC) 0.1 % external ointment  tacrolimus (PROTOPIC) 0.1 % ointment  urea (GORMEL) 20 % external cream          Review of Systems   All other systems reviewed and are negative.      Physical Exam   BP: 120/68  Pulse: 87  Temp: 97.6  F (36.4  C)  Resp: 17  SpO2: 97 %      Physical Exam  Vitals  and nursing note reviewed.   Constitutional:       General: She is not in acute distress.     Appearance: Normal appearance. She is not ill-appearing.      Comments: Patient standing during visit   Cardiovascular:      Rate and Rhythm: Normal rate.      Pulses: Normal pulses.   Pulmonary:      Effort: Pulmonary effort is normal.   Musculoskeletal:      Comments: Significant pain located at the sciatic area.  Skin changes.   Neurological:      Mental Status: She is alert.      Sensory: No sensory deficit.      Motor: No weakness (Out of 5 strength of upper and lower extremities bilaterallyl).   Psychiatric:         Mood and Affect: Mood normal.         Behavior: Behavior normal.         ED Course        Procedures           No results found for this or any previous visit (from the past 24 hours).    Medications - No data to display    Assessments & Plan (with Medical Decision Making)     I have reviewed the nursing notes.    I have reviewed the findings, diagnosis, plan and need for follow up with the patient.      Medical Decision Making  75 year old female who presents to  urgent care with chief complaint of sciatica.  Patient does have history of sciatica.  She reports that she had arthritis flare of her left knee a few weeks ago.  She then developed severe sciatic pain over the last few days.  She reports sharp shooting pains into her posterior leg and in her mid back.  She has been taking ibuprofen for her pain which has not been helpful.  Pain is worse with sitting and standing up.  She does have physical therapy appointments coming up.  Seizure, incontinence of bowel or bladder.      Patient with back pain. No NV deficits or complaints. No bowel or bladder dysfunction. No fevers or IV drug use. Normal strength, sensation and DTRs in LE bilat. No red flags and normal neuro exam so no imaging is indicated at this time. Plan symptomatic relief and FU with PCP. Discussed at length with the patient and all  questions fully answered. Return if symptoms persist or worsen, or any new troubling symptoms develop.      Start taking prednisone with food once daily for the next 5 days.  Do not take NSAIDs (ibuprofen) with this.  You may continue taking Tylenol for pain.    Apply lidocaine patches for pain management.    Flexeril is a muscle relaxer this may make you sleepy.  Do not take while driving or operating heavy machinery.    Follow-up with physical therapy and orthopedics if pain worsens or does not improve over the next few days.    If numbness in groin or incontinence of bowel or bladder develop please follow-up in ER.      Prior to making a final disposition on this patient the results of patient's tests and other diagnostic studies were discussed with the patient. All questions were answered. Patient expressed understanding of the plan and was amenable to it.     Disclaimer: This note consists of symbols derived from keyboarding, dictation and/or voice recognition software. As a result, there may be errors in the script that have gone undetected. Please consider this when interpreting information found in this chart.      Discharge Medication List as of 7/27/2025  1:46 PM        START taking these medications    Details   cyclobenzaprine (FLEXERIL) 10 MG tablet Take 1 tablet (10 mg) by mouth 3 times daily as needed for muscle spasms., Disp-21 tablet, R-0, E-Prescribe      lidocaine (LIDODERM) 5 % patch Place 1 patch over 12 hours onto the skin every 24 hours for 5 days. To prevent lidocaine toxicity, patient should be patch free for 12 hrs daily.Disp-5 patch, [id number]      !! predniSONE (DELTASONE) 20 MG tablet Take two tablets (= 40mg) each day for 5 (five) days, Disp-10 tablet, R-0, E-Prescribe       !! - Potential duplicate medications found. Please discuss with provider.          Final diagnoses:   Acute bilateral low back pain with left-sided sciatica       7/27/2025   [facility]  DEPT       Nickie Delaney PA-C  07/27/25 [id number]

## 2025-07-27 NOTE — DISCHARGE INSTRUCTIONS
Start taking prednisone with food once daily for the next 5 days.  Do not take NSAIDs (ibuprofen) with this.  You may continue taking Tylenol for pain.    Apply lidocaine patches for pain management.    Flexeril is a muscle relaxer this may make you sleepy.  Do not take while driving or operating heavy machinery.    Follow-up with physical therapy and orthopedics if pain worsens or does not improve over the next few days.    If numbness in groin or incontinence of bowel or bladder develop please follow-up in ER.

## (undated) DEVICE — SOL WATER IRRIG 1000ML BOTTLE 07139-09

## (undated) DEVICE — SOL NACL 0.9% IRRIG 1000ML BOTTLE 07138-09

## (undated) DEVICE — PREP PAD ALCOHOL 6818

## (undated) RX ORDER — PHENYLEPHRINE HYDROCHLORIDE 25 MG/ML
SOLUTION/ DROPS OPHTHALMIC
Status: DISPENSED
Start: 2017-07-03

## (undated) RX ORDER — PHENYLEPHRINE HYDROCHLORIDE 25 MG/ML
SOLUTION/ DROPS OPHTHALMIC
Status: DISPENSED
Start: 2017-07-24

## (undated) RX ORDER — CYCLOPENTOLATE HYDROCHLORIDE 10 MG/ML
SOLUTION/ DROPS OPHTHALMIC
Status: DISPENSED
Start: 2017-07-03

## (undated) RX ORDER — CYCLOPENTOLATE HYDROCHLORIDE 10 MG/ML
SOLUTION/ DROPS OPHTHALMIC
Status: DISPENSED
Start: 2017-07-24

## (undated) RX ORDER — TROPICAMIDE 10 MG/ML
SOLUTION/ DROPS OPHTHALMIC
Status: DISPENSED
Start: 2017-07-03

## (undated) RX ORDER — TROPICAMIDE 10 MG/ML
SOLUTION/ DROPS OPHTHALMIC
Status: DISPENSED
Start: 2017-07-24